# Patient Record
Sex: FEMALE | Race: WHITE | NOT HISPANIC OR LATINO | Employment: OTHER | RURAL
[De-identification: names, ages, dates, MRNs, and addresses within clinical notes are randomized per-mention and may not be internally consistent; named-entity substitution may affect disease eponyms.]

---

## 2021-05-17 ENCOUNTER — LAB (OUTPATIENT)
Dept: LAB | Facility: HOSPITAL | Age: 76
End: 2021-05-17

## 2021-05-17 ENCOUNTER — OFFICE VISIT (OUTPATIENT)
Dept: FAMILY MEDICINE CLINIC | Facility: CLINIC | Age: 76
End: 2021-05-17

## 2021-05-17 VITALS
DIASTOLIC BLOOD PRESSURE: 60 MMHG | WEIGHT: 168.8 LBS | HEIGHT: 60 IN | RESPIRATION RATE: 18 BRPM | OXYGEN SATURATION: 97 % | SYSTOLIC BLOOD PRESSURE: 98 MMHG | HEART RATE: 63 BPM | BODY MASS INDEX: 33.14 KG/M2

## 2021-05-17 DIAGNOSIS — I48.91 ATRIAL FIBRILLATION, UNSPECIFIED TYPE (HCC): ICD-10-CM

## 2021-05-17 DIAGNOSIS — J44.9 CHRONIC OBSTRUCTIVE PULMONARY DISEASE, UNSPECIFIED COPD TYPE (HCC): ICD-10-CM

## 2021-05-17 DIAGNOSIS — I50.21 ACUTE SYSTOLIC CHF (CONGESTIVE HEART FAILURE) (HCC): ICD-10-CM

## 2021-05-17 DIAGNOSIS — M1A.9XX0 CHRONIC GOUT WITHOUT TOPHUS, UNSPECIFIED CAUSE, UNSPECIFIED SITE: Primary | ICD-10-CM

## 2021-05-17 DIAGNOSIS — E53.8 B12 DEFICIENCY: ICD-10-CM

## 2021-05-17 PROCEDURE — 80053 COMPREHEN METABOLIC PANEL: CPT | Performed by: STUDENT IN AN ORGANIZED HEALTH CARE EDUCATION/TRAINING PROGRAM

## 2021-05-17 PROCEDURE — 84550 ASSAY OF BLOOD/URIC ACID: CPT | Performed by: STUDENT IN AN ORGANIZED HEALTH CARE EDUCATION/TRAINING PROGRAM

## 2021-05-17 PROCEDURE — 99204 OFFICE O/P NEW MOD 45 MIN: CPT | Performed by: STUDENT IN AN ORGANIZED HEALTH CARE EDUCATION/TRAINING PROGRAM

## 2021-05-17 PROCEDURE — 82607 VITAMIN B-12: CPT | Performed by: STUDENT IN AN ORGANIZED HEALTH CARE EDUCATION/TRAINING PROGRAM

## 2021-05-17 PROCEDURE — 80061 LIPID PANEL: CPT | Performed by: STUDENT IN AN ORGANIZED HEALTH CARE EDUCATION/TRAINING PROGRAM

## 2021-05-17 PROCEDURE — 85025 COMPLETE CBC W/AUTO DIFF WBC: CPT | Performed by: STUDENT IN AN ORGANIZED HEALTH CARE EDUCATION/TRAINING PROGRAM

## 2021-05-17 PROCEDURE — 85007 BL SMEAR W/DIFF WBC COUNT: CPT | Performed by: STUDENT IN AN ORGANIZED HEALTH CARE EDUCATION/TRAINING PROGRAM

## 2021-05-17 RX ORDER — ALBUTEROL SULFATE 90 UG/1
2 AEROSOL, METERED RESPIRATORY (INHALATION) EVERY 4 HOURS PRN
Qty: 18 G | Refills: 6 | Status: SHIPPED | OUTPATIENT
Start: 2021-05-17

## 2021-05-17 RX ORDER — LANOLIN ALCOHOL/MO/W.PET/CERES
1000 CREAM (GRAM) TOPICAL DAILY
Qty: 90 TABLET | Refills: 1 | Status: SHIPPED | OUTPATIENT
Start: 2021-05-17 | End: 2021-12-28

## 2021-05-17 RX ORDER — ASPIRIN 325 MG
81 TABLET ORAL DAILY
COMMUNITY
End: 2021-05-17

## 2021-05-17 RX ORDER — ASPIRIN 81 MG/1
81 TABLET ORAL DAILY
Qty: 90 TABLET | Refills: 1 | Status: SHIPPED | OUTPATIENT
Start: 2021-05-17

## 2021-05-17 RX ORDER — ALLOPURINOL 300 MG/1
300 TABLET ORAL DAILY
Qty: 90 TABLET | Refills: 1 | Status: SHIPPED | OUTPATIENT
Start: 2021-05-17 | End: 2021-05-19

## 2021-05-17 RX ORDER — ALBUTEROL SULFATE 90 UG/1
2 AEROSOL, METERED RESPIRATORY (INHALATION) EVERY 4 HOURS PRN
COMMUNITY
End: 2021-05-17 | Stop reason: SDUPTHER

## 2021-05-17 RX ORDER — ALLOPURINOL 300 MG/1
300 TABLET ORAL DAILY
COMMUNITY
End: 2021-05-17 | Stop reason: SDUPTHER

## 2021-05-17 RX ORDER — METOPROLOL TARTRATE 100 MG/1
100 TABLET ORAL 2 TIMES DAILY
Qty: 180 TABLET | Refills: 1 | Status: SHIPPED | OUTPATIENT
Start: 2021-05-17 | End: 2022-03-15

## 2021-05-17 RX ORDER — PHENOL 1.4 %
600 AEROSOL, SPRAY (ML) MUCOUS MEMBRANE DAILY
COMMUNITY

## 2021-05-17 RX ORDER — LANOLIN ALCOHOL/MO/W.PET/CERES
1000 CREAM (GRAM) TOPICAL DAILY
COMMUNITY
End: 2021-05-17 | Stop reason: SDUPTHER

## 2021-05-17 RX ORDER — METOPROLOL TARTRATE 100 MG/1
100 TABLET ORAL 2 TIMES DAILY
COMMUNITY
End: 2021-05-17 | Stop reason: SDUPTHER

## 2021-05-17 NOTE — PROGRESS NOTES
"Subjective:  Pennie Gibson is a 76 y.o. female who presents for establish care.    Pt recently moved from [place], poor historian.       Gout; patient currently on allopurinol 3 mg daily, tolerating well, no adverse effects of medication.  No recent gout flares.    COPD; patient currently on albuterol inhaler as needed, no other medications, no shortness of breath, wheezing, productive cough, fever, chills. Uses Albuterol 1-2 times per month, 44 pack year history, quit ~23 years ago.     Afib/CHF/CAD; patient with pacemaker, reports being on her 2nd or third, on metoprolol 100 mg twice daily, tolerating well, no adverse effects.  Needs referral to cardiology.  Blood pressure today was 98/60, has not been checking BP at home.     Pt reports having a CVA around age of 53 years old, reports having issues with short term memory loss since then.       Vitals:    Vitals:    05/17/21 0951   BP: 98/60   Pulse: 63   Resp: 18   SpO2: 97%   Weight: 76.6 kg (168 lb 12.8 oz)   Height: 152.4 cm (60\")     Body mass index is 32.97 kg/m².      Current Outpatient Medications:   •  albuterol sulfate  (90 Base) MCG/ACT inhaler, Inhale 2 puffs Every 4 (Four) Hours As Needed for Wheezing., Disp: 18 g, Rfl: 6  •  allopurinol (ZYLOPRIM) 300 MG tablet, Take 1 tablet by mouth Daily., Disp: 90 tablet, Rfl: 1  •  calcium carbonate (OS-MICHELL) 600 MG tablet, Take 600 mg by mouth Daily., Disp: , Rfl:   •  Cranberry 1000 MG capsule, Take 4,200 mg by mouth Daily., Disp: , Rfl:   •  Garlic 1000 MG capsule, Take 1,000 mg by mouth Daily., Disp: , Rfl:   •  metoprolol tartrate (LOPRESSOR) 100 MG tablet, Take 1 tablet by mouth 2 (Two) Times a Day., Disp: 180 tablet, Rfl: 1  •  rivaroxaban (XARELTO) 20 MG tablet, Take 1 tablet by mouth Daily., Disp: 90 tablet, Rfl: 1  •  vitamin B-12 (CYANOCOBALAMIN) 1000 MCG tablet, Take 1 tablet by mouth Daily., Disp: 90 tablet, Rfl: 1  •  aspirin (aspirin) 81 MG EC tablet, Take 1 tablet by mouth Daily., Disp: " 90 tablet, Rfl: 1    There is no problem list on file for this patient.    Past Surgical History:   Procedure Laterality Date   • BLADDER SURGERY     • CARDIAC SURGERY     • COLON RESECTION     • PACEMAKER IMPLANTATION     • VAGINAL BIOPSY       Social History     Socioeconomic History   • Marital status:      Spouse name: Not on file   • Number of children: Not on file   • Years of education: Not on file   • Highest education level: Not on file   Tobacco Use   • Smoking status: Never Smoker   • Smokeless tobacco: Never Used   Vaping Use   • Vaping Use: Never used   Substance and Sexual Activity   • Alcohol use: Defer   • Drug use: Never   • Sexual activity: Defer     Family History   Problem Relation Age of Onset   • Cancer Mother    • Diabetes Father    • Diabetes Sister    • Diabetes Brother      No results found for any previous visit.      No image results found.    @[facility]@    There is no immunization history on file for this patient.  The following portions of the patient's history were reviewed and updated as appropriate: allergies, current medications, past family history, past medical history, past social history, past surgical history and problem list.    PHQ-9 Total Score:           Physical Exam  Constitutional:       Appearance: Normal appearance.   HENT:      Head: Normocephalic and atraumatic.      Right Ear: External ear normal.      Left Ear: External ear normal.   Eyes:      General:         Right eye: No discharge.         Left eye: No discharge.      Conjunctiva/sclera: Conjunctivae normal.   Cardiovascular:      Rate and Rhythm: Normal rate and regular rhythm.      Pulses: Normal pulses.      Heart sounds: Normal heart sounds. No murmur heard.     Pulmonary:      Effort: Pulmonary effort is normal. No respiratory distress.      Breath sounds: Normal breath sounds.   Abdominal:      General: There is no distension.      Palpations: Abdomen is soft.      Tenderness: There is no  abdominal tenderness.   Musculoskeletal:      Cervical back: Normal range of motion.      Right lower leg: No edema.      Left lower leg: No edema.   Lymphadenopathy:      Cervical: No cervical adenopathy.   Neurological:      Mental Status: She is alert. Mental status is at baseline.   Psychiatric:         Mood and Affect: Mood normal.         Behavior: Behavior normal.         Assessment/Plan    Diagnosis Plan   1. Chronic gout without tophus, unspecified cause, unspecified site  Uric Acid    allopurinol (ZYLOPRIM) 300 MG tablet   2. Acute systolic CHF (congestive heart failure) (CMS/[facility])  CBC & Differential    Comprehensive Metabolic Panel    Ambulatory Referral to Cardiology    aspirin (aspirin) 81 MG EC tablet    metoprolol tartrate (LOPRESSOR) 100 MG tablet    Lipid Panel   3. Chronic obstructive pulmonary disease, unspecified COPD type (CMS/[facility])  albuterol sulfate  (90 Base) MCG/ACT inhaler   4. B12 deficiency  vitamin B-12 (CYANOCOBALAMIN) 1000 MCG tablet    Vitamin B12   5. Atrial fibrillation, unspecified type (CMS/[facility])  rivaroxaban (XARELTO) 20 MG tablet      Orders Placed This Encounter   Procedures   • Uric Acid     Order Specific Question:   Release to patient     Answer:   Immediate   • Comprehensive Metabolic Panel     Order Specific Question:   Release to patient     Answer:   Immediate   • Vitamin B12     Order Specific Question:   Release to patient     Answer:   Immediate   • Lipid Panel   • Ambulatory Referral to Cardiology     Referral Priority:   Routine     Referral Type:   Consultation     Referral Reason:   Specialty Services Required     Requested Specialty:   Cardiology     Number of Visits Requested:   1   • CBC & Differential     Order Specific Question:   Manual Differential     Answer:   No     Unfortunately pt poor historian, daughter now assisting with care after move from [place].  Unclear as to why patient not on cholesterol medication.  Will obtain labs as above, records  from previous provider, unless contraindication found, will begin on cholesterol medication.  Tolerating medications well as above, no adverse effects, patient currently stable with no acute complaints.  Follow-up in 1 month or sooner if needed.          This document has been electronically signed by Isaac Love MD on May 17, 2021 11:07 CDT

## 2021-05-18 LAB
ALBUMIN SERPL-MCNC: 3.8 G/DL (ref 3.5–5.2)
ALBUMIN/GLOB SERPL: 1.3 G/DL
ALP SERPL-CCNC: 69 U/L (ref 39–117)
ALT SERPL W P-5'-P-CCNC: 10 U/L (ref 1–33)
ANION GAP SERPL CALCULATED.3IONS-SCNC: 12 MMOL/L (ref 5–15)
AST SERPL-CCNC: 16 U/L (ref 1–32)
BILIRUB SERPL-MCNC: 0.3 MG/DL (ref 0–1.2)
BUN SERPL-MCNC: 20 MG/DL (ref 8–23)
BUN/CREAT SERPL: 9.8 (ref 7–25)
CALCIUM SPEC-SCNC: 9.7 MG/DL (ref 8.6–10.5)
CHLORIDE SERPL-SCNC: 104 MMOL/L (ref 98–107)
CHOLEST SERPL-MCNC: 148 MG/DL (ref 0–200)
CO2 SERPL-SCNC: 25 MMOL/L (ref 22–29)
CREAT SERPL-MCNC: 2.04 MG/DL (ref 0.57–1)
DEPRECATED RDW RBC AUTO: 39.6 FL (ref 37–54)
EOSINOPHIL # BLD MANUAL: 0.24 10*3/MM3 (ref 0–0.4)
EOSINOPHIL NFR BLD MANUAL: 2 % (ref 0.3–6.2)
ERYTHROCYTE [DISTWIDTH] IN BLOOD BY AUTOMATED COUNT: 13.5 % (ref 12.3–15.4)
GFR SERPL CREATININE-BSD FRML MDRD: 24 ML/MIN/1.73
GLOBULIN UR ELPH-MCNC: 2.9 GM/DL
GLUCOSE SERPL-MCNC: 77 MG/DL (ref 65–99)
HCT VFR BLD AUTO: 42.3 % (ref 34–46.6)
HDLC SERPL-MCNC: 33 MG/DL (ref 40–60)
HGB BLD-MCNC: 14.3 G/DL (ref 12–15.9)
LDLC SERPL CALC-MCNC: 91 MG/DL (ref 0–100)
LDLC/HDLC SERPL: 2.66 {RATIO}
LYMPHOCYTES # BLD MANUAL: 4.24 10*3/MM3 (ref 0.7–3.1)
LYMPHOCYTES NFR BLD MANUAL: 10.1 % (ref 5–12)
LYMPHOCYTES NFR BLD MANUAL: 35.4 % (ref 19.6–45.3)
MCH RBC QN AUTO: 28.1 PG (ref 26.6–33)
MCHC RBC AUTO-ENTMCNC: 33.8 G/DL (ref 31.5–35.7)
MCV RBC AUTO: 83.1 FL (ref 79–97)
MONOCYTES # BLD AUTO: 1.21 10*3/MM3 (ref 0.1–0.9)
NEUTROPHILS # BLD AUTO: 6.28 10*3/MM3 (ref 1.7–7)
NEUTROPHILS NFR BLD MANUAL: 52.5 % (ref 42.7–76)
OVALOCYTES BLD QL SMEAR: ABNORMAL
PLAT MORPH BLD: NORMAL
PLATELET # BLD AUTO: 374 10*3/MM3 (ref 140–450)
PMV BLD AUTO: 10.4 FL (ref 6–12)
POIKILOCYTOSIS BLD QL SMEAR: ABNORMAL
POTASSIUM SERPL-SCNC: 4.3 MMOL/L (ref 3.5–5.2)
PROT SERPL-MCNC: 6.7 G/DL (ref 6–8.5)
RBC # BLD AUTO: 5.09 10*6/MM3 (ref 3.77–5.28)
SODIUM SERPL-SCNC: 141 MMOL/L (ref 136–145)
TRIGL SERPL-MCNC: 136 MG/DL (ref 0–150)
URATE SERPL-MCNC: 5.6 MG/DL (ref 2.4–5.7)
VIT B12 BLD-MCNC: 697 PG/ML (ref 211–946)
VLDLC SERPL-MCNC: 24 MG/DL (ref 5–40)
WBC # BLD AUTO: 11.97 10*3/MM3 (ref 3.4–10.8)
WBC MORPH BLD: NORMAL

## 2021-05-19 ENCOUNTER — TELEPHONE (OUTPATIENT)
Dept: FAMILY MEDICINE CLINIC | Facility: CLINIC | Age: 76
End: 2021-05-19

## 2021-05-19 DIAGNOSIS — M1A.9XX0 CHRONIC GOUT WITHOUT TOPHUS, UNSPECIFIED CAUSE, UNSPECIFIED SITE: ICD-10-CM

## 2021-05-19 DIAGNOSIS — N18.4 STAGE 4 CHRONIC KIDNEY DISEASE (HCC): Primary | ICD-10-CM

## 2021-05-19 RX ORDER — ALLOPURINOL 100 MG/1
50 TABLET ORAL EVERY OTHER DAY
Qty: 90 TABLET | Refills: 0 | Status: SHIPPED | OUTPATIENT
Start: 2021-05-19 | End: 2021-06-18

## 2021-05-19 NOTE — TELEPHONE ENCOUNTER
Advised pt's daughter of lab results. Pt's daughter wants to keep all referral's within [facility]. Will make medication changes today.

## 2021-05-19 NOTE — TELEPHONE ENCOUNTER
Called pt to go over lab results. If I am not in the office if/when she calls back can this note be sent to another MA to advise pt to take meds differently, please.

## 2021-05-19 NOTE — TELEPHONE ENCOUNTER
----- Message from Isaac Love MD sent at 5/19/2021  9:12 AM CDT -----  Labs significant for stage 4 (severe) kidney disease, as such I have reduced your allopurinol to 50mg every other day and referred you to nephrology. Please STOP taking 300mg daily. We will check at follow up.

## 2021-05-21 ENCOUNTER — OFFICE VISIT (OUTPATIENT)
Dept: FAMILY MEDICINE CLINIC | Facility: CLINIC | Age: 76
End: 2021-05-21

## 2021-05-21 VITALS
DIASTOLIC BLOOD PRESSURE: 66 MMHG | WEIGHT: 169.2 LBS | OXYGEN SATURATION: 96 % | HEIGHT: 60 IN | SYSTOLIC BLOOD PRESSURE: 100 MMHG | HEART RATE: 66 BPM | TEMPERATURE: 98 F | BODY MASS INDEX: 33.22 KG/M2 | RESPIRATION RATE: 24 BRPM

## 2021-05-21 DIAGNOSIS — N39.0 ACUTE UTI: Primary | ICD-10-CM

## 2021-05-21 DIAGNOSIS — N28.9 DECREASED RENAL FUNCTION: ICD-10-CM

## 2021-05-21 DIAGNOSIS — J30.2 SEASONAL ALLERGIES: ICD-10-CM

## 2021-05-21 DIAGNOSIS — R39.9 UTI SYMPTOMS: ICD-10-CM

## 2021-05-21 LAB
BILIRUB BLD-MCNC: NEGATIVE MG/DL
CLARITY, POC: ABNORMAL
COLOR UR: YELLOW
GLUCOSE UR STRIP-MCNC: NEGATIVE MG/DL
KETONES UR QL: NEGATIVE
LEUKOCYTE EST, POC: ABNORMAL
NITRITE UR-MCNC: NEGATIVE MG/ML
PH UR: 6 [PH] (ref 5–8)
PROT UR STRIP-MCNC: ABNORMAL MG/DL
RBC # UR STRIP: ABNORMAL /UL
SP GR UR: 1.02 (ref 1–1.03)
UROBILINOGEN UR QL: NORMAL

## 2021-05-21 PROCEDURE — 87186 SC STD MICRODIL/AGAR DIL: CPT | Performed by: NURSE PRACTITIONER

## 2021-05-21 PROCEDURE — 87077 CULTURE AEROBIC IDENTIFY: CPT | Performed by: NURSE PRACTITIONER

## 2021-05-21 PROCEDURE — 99213 OFFICE O/P EST LOW 20 MIN: CPT | Performed by: NURSE PRACTITIONER

## 2021-05-21 PROCEDURE — 87086 URINE CULTURE/COLONY COUNT: CPT | Performed by: NURSE PRACTITIONER

## 2021-05-21 PROCEDURE — 81003 URINALYSIS AUTO W/O SCOPE: CPT | Performed by: NURSE PRACTITIONER

## 2021-05-21 RX ORDER — CIPROFLOXACIN 250 MG/1
250 TABLET, FILM COATED ORAL 2 TIMES DAILY
Qty: 14 TABLET | Refills: 0 | Status: SHIPPED | OUTPATIENT
Start: 2021-05-21 | End: 2021-05-24

## 2021-05-21 NOTE — PATIENT INSTRUCTIONS
Urinary Tract Infection, Adult    A urinary tract infection (UTI) is an infection of any part of the urinary tract. The urinary tract includes the kidneys, ureters, bladder, and urethra. These organs make, store, and get rid of urine in the body.  Your health care provider may use other names to describe the infection. An upper UTI affects the ureters and kidneys (pyelonephritis). A lower UTI affects the bladder (cystitis) and urethra (urethritis).  What are the causes?  Most urinary tract infections are caused by bacteria in your genital area, around the entrance to your urinary tract (urethra). These bacteria grow and cause inflammation of your urinary tract.  What increases the risk?  You are more likely to develop this condition if:  · You have a urinary catheter that stays in place (indwelling).  · You are not able to control when you urinate or have a bowel movement (you have incontinence).  · You are female and you:  ? Use a spermicide or diaphragm for birth control.  ? Have low estrogen levels.  ? Are pregnant.  · You have certain genes that increase your risk (genetics).  · You are sexually active.  · You take antibiotic medicines.  · You have a condition that causes your flow of urine to slow down, such as:  ? An enlarged prostate, if you are male.  ? Blockage in your urethra (stricture).  ? A kidney stone.  ? A nerve condition that affects your bladder control (neurogenic bladder).  ? Not getting enough to drink, or not urinating often.  · You have certain medical conditions, such as:  ? Diabetes.  ? A weak disease-fighting system (immunesystem).  ? Sickle cell disease.  ? Gout.  ? Spinal cord injury.  What are the signs or symptoms?  Symptoms of this condition include:  · Needing to urinate right away (urgently).  · Frequent urination or passing small amounts of urine frequently.  · Pain or burning with urination.  · Blood in the urine.  · Urine that smells bad or unusual.  · Trouble urinating.  · Cloudy  urine.  · Vaginal discharge, if you are female.  · Pain in the abdomen or the lower back.  You may also have:  · Vomiting or a decreased appetite.  · Confusion.  · Irritability or tiredness.  · A fever.  · Diarrhea.  The first symptom in older adults may be confusion. In some cases, they may not have any symptoms until the infection has worsened.  How is this diagnosed?  This condition is diagnosed based on your medical history and a physical exam. You may also have other tests, including:  · Urine tests.  · Blood tests.  · Tests for sexually transmitted infections (STIs).  If you have had more than one UTI, a cystoscopy or imaging studies may be done to determine the cause of the infections.  How is this treated?  Treatment for this condition includes:  · Antibiotic medicine.  · Over-the-counter medicines to treat discomfort.  · Drinking enough water to stay hydrated.  If you have frequent infections or have other conditions such as a kidney stone, you may need to see a health care provider who specializes in the urinary tract (urologist).  In rare cases, urinary tract infections can cause sepsis. Sepsis is a life-threatening condition that occurs when the body responds to an infection. Sepsis is treated in the hospital with IV antibiotics, fluids, and other medicines.  Follow these instructions at home:    Medicines  · Take over-the-counter and prescription medicines only as told by your health care provider.  · If you were prescribed an antibiotic medicine, take it as told by your health care provider. Do not stop using the antibiotic even if you start to feel better.  General instructions  · Make sure you:  ? Empty your bladder often and completely. Do not hold urine for long periods of time.  ? Empty your bladder after sex.  ? Wipe from front to back after a bowel movement if you are female. Use each tissue one time when you wipe.  · Drink enough fluid to keep your urine pale yellow.  · Keep all follow-up  visits as told by your health care provider. This is important.  Contact a health care provider if:  · Your symptoms do not get better after 1-2 days.  · Your symptoms go away and then return.  Get help right away if you have:  · Severe pain in your back or your lower abdomen.  · A fever.  · Nausea or vomiting.  Summary  · A urinary tract infection (UTI) is an infection of any part of the urinary tract, which includes the kidneys, ureters, bladder, and urethra.  · Most urinary tract infections are caused by bacteria in your genital area, around the entrance to your urinary tract (urethra).  · Treatment for this condition often includes antibiotic medicines.  · If you were prescribed an antibiotic medicine, take it as told by your health care provider. Do not stop using the antibiotic even if you start to feel better.  · Keep all follow-up visits as told by your health care provider. This is important.  This information is not intended to replace advice given to you by your health care provider. Make sure you discuss any questions you have with your health care provider.  Document Revised: 12/05/2019 Document Reviewed: 06/27/2019  [employer] Patient Education © 2021 [employer]

## 2021-05-21 NOTE — PROGRESS NOTES
"Chief Complaint  Cystitis    Subjective          Pennie Gibson presents to [facility]    [facility]    PCP: Dr. Love    CC: \"bladder infection symptoms\"    Recently moved here to live near daughter from [place].  Established with Dr. Love on 5/17.  Started having UTI symptoms about 2-3 days ago.  C/O urgency, mild dysuria.  Reports hx of recurrent UTI's in the past, but has been about 6 months since she was last treated.  Recent labs showed decreased renal function and she has bene referred to nephrology, waiting on appt.  Denies previously being followed by neprhology in [place].      Also c/o congestion, ears popping at times since moving to [place].     Poor historian on some of her history.     Cystitis  This is a new problem. Episode onset: x 2-3 days. The problem occurs daily. The problem has been gradually worsening. Associated symptoms include congestion. Pertinent negatives include no abdominal pain, anorexia, arthralgias, change in bowel habit, chest pain, chills, coughing, diaphoresis, fatigue, fever, headaches, joint swelling, myalgias, nausea, neck pain, numbness, rash, sore throat, swollen glands, urinary symptoms, vertigo, visual change, vomiting or weakness. Exacerbated by: admits to not drinking much water, drinks soda. She has tried nothing for the symptoms.       Review of Systems   Constitutional: Negative for appetite change, chills, diaphoresis, fatigue and fever.   HENT: Positive for congestion and ear pain (popping). Negative for ear discharge, postnasal drip, rhinorrhea, sinus pressure, sinus pain, sneezing, sore throat and trouble swallowing.    Eyes: Negative.    Respiratory: Negative.  Negative for cough.    Cardiovascular: Negative.  Negative for chest pain.   Gastrointestinal: Negative for abdominal pain, anorexia, change in bowel habit, nausea and vomiting.   Genitourinary: Positive for dysuria, frequency and urgency. Negative for flank " "pain.   Musculoskeletal: Negative for arthralgias, joint swelling, myalgias and neck pain.   Skin: Negative for rash.   Neurological: Negative for dizziness, vertigo, weakness, numbness and headaches.        Objective   Vital Signs:   /66 (BP Location: Left arm, Patient Position: Sitting, Cuff Size: Adult)   Pulse 66   Temp 98 °F (36.7 °C)   Resp 24   Ht 152.4 cm (60\")   Wt 76.7 kg (169 lb 3.2 oz)   SpO2 96%   BMI 33.04 kg/m²       Physical Exam  Vitals and nursing note reviewed.   Constitutional:       General: She is not in acute distress.     Appearance: Normal appearance. She is obese. She is not ill-appearing.   HENT:      Head: Normocephalic and atraumatic.      Right Ear: Ear canal normal. A middle ear effusion ( small) is present. Tympanic membrane is not erythematous.      Left Ear: Ear canal normal. A middle ear effusion ( small) is present. Tympanic membrane is not erythematous.      Nose: Mucosal edema (pale, boggy) and congestion present.      Right Sinus: No maxillary sinus tenderness or frontal sinus tenderness.      Left Sinus: No maxillary sinus tenderness or frontal sinus tenderness.      Mouth/Throat:      Mouth: Mucous membranes are moist.      Pharynx: Oropharynx is clear. No pharyngeal swelling, oropharyngeal exudate or posterior oropharyngeal erythema.   Eyes:      General:         Right eye: No discharge.         Left eye: No discharge.      Conjunctiva/sclera: Conjunctivae normal.   Cardiovascular:      Rate and Rhythm: Normal rate and regular rhythm.   Pulmonary:      Effort: Pulmonary effort is normal. No respiratory distress.      Breath sounds: Normal breath sounds. No wheezing, rhonchi or rales.   Abdominal:      General: Bowel sounds are normal.      Palpations: Abdomen is soft.      Tenderness: There is no abdominal tenderness. There is no right CVA tenderness, left CVA tenderness or guarding.   Musculoskeletal:      Cervical back: Neck supple. No tenderness.   "   Lymphadenopathy:      Cervical: No cervical adenopathy.   Skin:     General: Skin is warm and dry.   Neurological:      General: No focal deficit present.      Mental Status: She is alert and oriented to person, place, and time.          Result Review :     CMP    CMP 5/17/21   Glucose 77   BUN 20   Creatinine 2.04 (A)   eGFR Non African Am 24 (A)   Sodium 141   Potassium 4.3   Chloride 104   Calcium 9.7   Albumin 3.80   Total Bilirubin 0.3   Alkaline Phosphatase 69   AST (SGOT) 16   ALT (SGPT) 10   (A) Abnormal value                   Recent Results (from the past 24 hour(s))   POCT urinalysis dipstick, automated    Collection Time: 05/21/21  2:26 PM    Specimen: Urine   Result Value Ref Range    Color Yellow Yellow, Straw, Dark Yellow, [place]    Clarity, UA Cloudy (A) Clear    Specific Gravity  1.020 1.005 - 1.030    pH, Urine 6.0 5.0 - 8.0    Leukocytes Large (3+) (A) Negative    Nitrite, UA Negative Negative    Protein, POC Trace (A) Negative mg/dL    Glucose, UA Negative Negative, 1000 mg/dL (3+) mg/dL    Ketones, UA Negative Negative    Urobilinogen, UA Normal Normal    Bilirubin Negative Negative    Blood, UA Trace (A) Negative             Assessment and Plan    Diagnoses and all orders for this visit:    1. Acute UTI (Primary)  -     ciprofloxacin (Cipro) 250 MG tablet; Take 1 tablet by mouth 2 (Two) Times a Day.  Dispense: 14 tablet; Refill: 0    2. UTI symptoms  -     POCT urinalysis dipstick, automated  -     Urine Culture - Urine, Urine, Clean Catch    3. Seasonal allergies    Increase water, decrease sodas  Rx for Cipro provided  Urine culture pending--will notify with results when available  Referral to nephrology being handled by PCP    Seasonal allergies--may use OTC Claritin or Allegra as needed    Patient's Body mass index is 33.04 kg/m². indicating that she is obese (BMI >30). Obesity-related health conditions include the following: hypertension. Obesity is unchanged. BMI is is above average; BMI  management plan is completed. We discussed portion control and increasing exercise..    See PCP or RTC if symptoms persist/worsen  See PCP for routine f/u visit and management of chronic medical conditions      This document has been electronically signed by DEN Pressley on May 21, 2021 14:33 CDT,.

## 2021-05-23 LAB — BACTERIA SPEC AEROBE CULT: ABNORMAL

## 2021-05-24 DIAGNOSIS — N39.0 ACUTE UTI: Primary | ICD-10-CM

## 2021-05-24 RX ORDER — GRANULES FOR ORAL 3 G/1
3 POWDER ORAL ONCE
Qty: 3 G | Refills: 0 | Status: SHIPPED | OUTPATIENT
Start: 2021-05-24 | End: 2021-05-24

## 2021-06-02 ENCOUNTER — OFFICE VISIT (OUTPATIENT)
Dept: CARDIOLOGY | Facility: CLINIC | Age: 76
End: 2021-06-02

## 2021-06-02 VITALS
WEIGHT: 174.2 LBS | HEART RATE: 60 BPM | HEIGHT: 60 IN | DIASTOLIC BLOOD PRESSURE: 64 MMHG | OXYGEN SATURATION: 97 % | BODY MASS INDEX: 34.2 KG/M2 | SYSTOLIC BLOOD PRESSURE: 122 MMHG

## 2021-06-02 DIAGNOSIS — I34.0 NONRHEUMATIC MITRAL VALVE REGURGITATION: ICD-10-CM

## 2021-06-02 DIAGNOSIS — I48.91 ATRIAL FIBRILLATION, UNSPECIFIED TYPE (HCC): ICD-10-CM

## 2021-06-02 DIAGNOSIS — I36.1 NONRHEUMATIC TRICUSPID VALVE REGURGITATION: ICD-10-CM

## 2021-06-02 DIAGNOSIS — Z95.0 PRESENCE OF CARDIAC PACEMAKER: ICD-10-CM

## 2021-06-02 DIAGNOSIS — I50.9 CONGESTIVE HEART FAILURE, UNSPECIFIED HF CHRONICITY, UNSPECIFIED HEART FAILURE TYPE (HCC): Primary | ICD-10-CM

## 2021-06-02 DIAGNOSIS — I49.5 SSS (SICK SINUS SYNDROME) (HCC): ICD-10-CM

## 2021-06-02 DIAGNOSIS — N39.0 ACUTE UTI: Primary | ICD-10-CM

## 2021-06-02 PROCEDURE — 99214 OFFICE O/P EST MOD 30 MIN: CPT | Performed by: NURSE PRACTITIONER

## 2021-06-02 PROCEDURE — 93000 ELECTROCARDIOGRAM COMPLETE: CPT | Performed by: INTERNAL MEDICINE

## 2021-06-02 NOTE — PROGRESS NOTES
[facility] CHF CLINIC OFFICE VISIT    Subjective:     Congestive Heart Failure (chief complaint)      History of Present Illness      PCP: Dr. Love  Cardiologist:  Nephrologist: Dr. Jorge Herring  Pulmonologist:    Pennie Gibson is a 76-year-old  female he moved to this area to live with her daughter, previously she lived in [place].  Complex past medical history of gout (chronic, managed with allopurinol), COPD (chronic), history of atrial fibrillation (OAC with Xarelto) , CAD, hypertension, hyperlipidemia,  gout, obesity, stage III kidney disease, CVA, sick sinus syndrome status post PPM in 2005 with atrial lead placement in 2015 , bifascicular block, aortic valve calcification, mild AR, moderate MR and TR. In reviewing her records it appears she underwent myocardial perfusion imaging in light 2020 which showed apical perfusion defect, small with indeterminate criteria for ischemia.  Patient was recommended for continued medical management.  She has had 3 previous heart cath in 2008, 2010 which showed 30% mild CAD noted to the RCA.  Last left heart catheterization in April 2016, patient denies previous stent placement.  Last echocardiogram in July 2020 showing aortic calcification with mild aortic regurgitation, mild mitral and tricuspid regurgitation, mild pulmonic regurgitation, borderline biatrial enlargement, normal LVEF at 65%. Patient has a [employer] permanent pacemaker last interrogated in November 2020 showing normal device function with battery life of 1.5 years.      Patient mentions shortness of breath with exertion.  She denies chest pain, edema, orthopnea, PND, palpitations or dizziness.  She reports she is tolerating her medications and doing well.  She is a previous smoker and quit 23 years ago.  She is a poor historian and much of her history is obtained from her medical records, which are quite extensive to review.                  Past Medical History:   Diagnosis Date   • Asthma     • CHF (congestive heart failure) (CMS/[facility])    • COPD (chronic obstructive pulmonary disease) (CMS/[facility])    • Pacemaker      Past Surgical History:   Procedure Laterality Date   • BLADDER SURGERY     • CARDIAC SURGERY     • COLON RESECTION     • PACEMAKER IMPLANTATION     • VAGINAL BIOPSY       Social History     Socioeconomic History   • Marital status:      Spouse name: Not on file   • Number of children: Not on file   • Years of education: Not on file   • Highest education level: Not on file   Tobacco Use   • Smoking status: Never Smoker   • Smokeless tobacco: Never Used   Vaping Use   • Vaping Use: Never used   Substance and Sexual Activity   • Alcohol use: Defer   • Drug use: Never   • Sexual activity: Defer     Family History   Problem Relation Age of Onset   • Cancer Mother    • Diabetes Father    • Diabetes Sister    • Diabetes Brother        Allergies:  Allergies   Allergen Reactions   • Penicillins Rash       Review of Systems   Constitutional: Positive for malaise/fatigue. Negative for chills, fever and weight gain.   HENT: Negative for nosebleeds and tinnitus.    Eyes: Negative for blurred vision and double vision.   Cardiovascular: Positive for dyspnea on exertion. Negative for chest pain, irregular heartbeat, leg swelling, palpitations and syncope.   Respiratory: Positive for shortness of breath. Negative for cough, sleep disturbances due to breathing and snoring.    Endocrine: Negative for polydipsia, polyphagia and polyuria.   Hematologic/Lymphatic: Negative for bleeding problem. Does not bruise/bleed easily.   Skin: Negative for color change and suspicious lesions.   Musculoskeletal: Negative for falls and myalgias.   Gastrointestinal: Negative for bloating, heartburn and hematochezia.   Genitourinary: Negative for dysuria and hematuria.   Neurological: Negative for dizziness, headaches, seizures, vertigo and weakness.   Psychiatric/Behavioral: Negative for altered mental status and  "depression. The patient does not have insomnia and is not nervous/anxious.    Allergic/Immunologic: Negative for environmental allergies and persistent infections.       Current Outpatient Medications   Medication Sig Dispense Refill   • albuterol sulfate  (90 Base) MCG/ACT inhaler Inhale 2 puffs Every 4 (Four) Hours As Needed for Wheezing. 18 g 6   • allopurinol (Zyloprim) 100 MG tablet Take 0.5 tablets by mouth Every Other Day. 90 tablet 0   • aspirin (aspirin) 81 MG EC tablet Take 1 tablet by mouth Daily. 90 tablet 1   • calcium carbonate (OS-MICHELL) 600 MG tablet Take 600 mg by mouth Daily.     • Cranberry 1000 MG capsule Take 4,200 mg by mouth Daily.     • Garlic 1000 MG capsule Take 1,000 mg by mouth Daily.     • metoprolol tartrate (LOPRESSOR) 100 MG tablet Take 1 tablet by mouth 2 (Two) Times a Day. 180 tablet 1   • rivaroxaban (XARELTO) 20 MG tablet Take 1 tablet by mouth Daily. 90 tablet 1   • vitamin B-12 (CYANOCOBALAMIN) 1000 MCG tablet Take 1 tablet by mouth Daily. 90 tablet 1     No current facility-administered medications for this visit.        Objective:     Vitals:    06/02/21 1600   BP: 122/64   BP Location: Left arm   Patient Position: Sitting   Cuff Size: Adult   Pulse: 60   SpO2: 97%   Weight: 79 kg (174 lb 3.2 oz)   Height: 152.4 cm (60\")       Wt Readings from Last 3 Encounters:   06/02/21 79 kg (174 lb 3.2 oz)   05/21/21 76.7 kg (169 lb 3.2 oz)   05/17/21 76.6 kg (168 lb 12.8 oz)            Vitals reviewed.   Constitutional:       General: Not in acute distress.     Appearance: Normal appearance. Well-developed. Not toxic-appearing or diaphoretic.   Eyes:      General: Lids are normal.      Conjunctiva/sclera: Conjunctivae normal.   HENT:      Head: Normocephalic and atraumatic.      Right Ear: External ear normal.      Left Ear: External ear normal.   Neck:      Vascular: No JVD.   Pulmonary:      Effort: Pulmonary effort is normal. No respiratory distress.      Breath sounds: Decreased " breath sounds present. No wheezing. No rales.   Chest:      Chest wall: Not tender to palpatation.   Cardiovascular:      PMI at left midclavicular line. Normal rate. Regular rhythm. S1. Sl and s2 split        Murmurs: There is a grade 2/6 systolic murmur at the LLSB.      No gallop. No S3 and S4 gallop. No click. No rub.   Pulses:     Intact distal pulses. No decreased pulses.   Edema:     Peripheral edema absent.   Abdominal:      General: Bowel sounds are normal. There is no distension.      Palpations: Abdomen is soft.      Tenderness: There is no abdominal tenderness.   Musculoskeletal:      Cervical back: Normal range of motion and neck supple. Skin:     General: Skin is warm and dry.      Coloration: Skin is not pale.      Findings: No erythema or rash.   Neurological:      Mental Status: Alert and oriented to person, place, and time.      Gait: Gait normal.   Psychiatric:         Behavior: Behavior normal.         Thought Content: Thought content normal.         Judgment: Judgment normal.         Cardiographics      @[email or web address]    No radiology results for the last 30 days.    Echocardiogram:         ECG:        Stress Testing:           Lab Review     No results found for: TSH  Lab Results   Component Value Date    GLUCOSE 77 05/17/2021    BUN 20 05/17/2021    CREATININE 2.04 (H) 05/17/2021    EGFRIFNONA 24 (L) 05/17/2021    BCR 9.8 05/17/2021    K 4.3 05/17/2021    CO2 25.0 05/17/2021    CALCIUM 9.7 05/17/2021    ALBUMIN 3.80 05/17/2021    AST 16 05/17/2021    ALT 10 05/17/2021     Lab Results   Component Value Date    WBC 11.97 (H) 05/17/2021    HGB 14.3 05/17/2021    HCT 42.3 05/17/2021    MCV 83.1 05/17/2021     05/17/2021       No results found for: CKTOTAL, CKMB, CKMBINDEX, TROPONINI, TROPONINT  No results found for: PROBNP    PFTS:         The following portions of the patient's history were reviewed and updated as appropriate: allergies, current medications, past family history, past medical  history, past social history, past surgical history and problem list.     Old records reviewed and pertinent information is included in the above objective data.     Assessment/Plan:      Diagnosis Plan   1. Congestive heart failure, unspecified HF chronicity, unspecified heart failure type (CMS/HCC)  ECG 12 Lead    Adult Transthoracic Echo Complete w/ Color, Spectral and Contrast if Necessary Per Protocol   2. Nonrheumatic mitral valve regurgitation  Adult Transthoracic Echo Complete w/ Color, Spectral and Contrast if Necessary Per Protocol   3. Nonrheumatic tricuspid valve regurgitation  Adult Transthoracic Echo Complete w/ Color, Spectral and Contrast if Necessary Per Protocol   4. SSS (sick sinus syndrome) (CMS/HCC)     5. Presence of cardiac pacemaker     6. Atrial fibrillation, unspecified type (CMS/HCC)       Upon reviewing chart I see no evidence of congestive heart failure.  There is no evidence of congestive heart failure upon exam today.  She is euvolemic with adequate hemodynamics.  She does have a history of valvular heart disease including aortic calcification with mild aortic regurgitation, mild mitral and tricuspid regurgitation, mild pulmonic regurgitation, borderline biatrial enlargement, normal LVEF at 65%. Patient has a [employer] permanent pacemaker placed for SSS,  last interrogated in November 2020 showing normal device function with battery life of 1.5 year.  I will arrange for patient to establish care with Dr. Medrano.  I have arranged for patient to be transitioned to our pacemaker clinic.  As well she does have a history of atrial fibrillation.  Today she appears to be in sinus rhythm.  She is managed with metoprolol tartrate 100 mg twice daily she is anticoagulated with Xarelto 20 mg daily.     -TTE to reassess for structural heart disease and valvular function.  -EKG today in office showing NSR, bifascicular block which is unchanged from previous EKG.        Follow up: [facility]  with Dr. Medrano as scheduled.               This document has been electronically signed by DEN Hawkins on June 2, 2021 17:38 CDT

## 2021-06-03 ENCOUNTER — LAB (OUTPATIENT)
Dept: LAB | Facility: HOSPITAL | Age: 76
End: 2021-06-03

## 2021-06-03 DIAGNOSIS — N39.0 ACUTE UTI: ICD-10-CM

## 2021-06-03 LAB
BACTERIA UR QL AUTO: ABNORMAL /HPF
BILIRUB UR QL STRIP: NEGATIVE
CLARITY UR: ABNORMAL
COLOR UR: YELLOW
GLUCOSE UR STRIP-MCNC: NEGATIVE MG/DL
HGB UR QL STRIP.AUTO: ABNORMAL
HYALINE CASTS UR QL AUTO: ABNORMAL /LPF
KETONES UR QL STRIP: NEGATIVE
LEUKOCYTE ESTERASE UR QL STRIP.AUTO: ABNORMAL
NITRITE UR QL STRIP: NEGATIVE
PH UR STRIP.AUTO: 6 [PH] (ref 5–9)
PROT UR QL STRIP: NEGATIVE
RBC # UR: ABNORMAL /HPF
REF LAB TEST METHOD: ABNORMAL
SP GR UR STRIP: 1.01 (ref 1–1.03)
SQUAMOUS #/AREA URNS HPF: ABNORMAL /HPF
UROBILINOGEN UR QL STRIP: ABNORMAL
WBC UR QL AUTO: ABNORMAL /HPF

## 2021-06-03 PROCEDURE — 87077 CULTURE AEROBIC IDENTIFY: CPT

## 2021-06-03 PROCEDURE — 87186 SC STD MICRODIL/AGAR DIL: CPT

## 2021-06-03 PROCEDURE — 87086 URINE CULTURE/COLONY COUNT: CPT

## 2021-06-03 PROCEDURE — 81001 URINALYSIS AUTO W/SCOPE: CPT

## 2021-06-04 DIAGNOSIS — B96.20 E-COLI UTI: Primary | ICD-10-CM

## 2021-06-04 DIAGNOSIS — N39.0 RECURRENT UTI: ICD-10-CM

## 2021-06-04 DIAGNOSIS — N39.0 E-COLI UTI: Primary | ICD-10-CM

## 2021-06-04 RX ORDER — CEPHALEXIN 250 MG/1
250 CAPSULE ORAL EVERY 8 HOURS
Qty: 21 CAPSULE | Refills: 0 | Status: SHIPPED | OUTPATIENT
Start: 2021-06-04 | End: 2021-06-11

## 2021-06-05 LAB
BACTERIA SPEC AEROBE CULT: ABNORMAL
QT INTERVAL: 452 MS
QTC INTERVAL: 452 MS

## 2021-06-18 ENCOUNTER — OFFICE VISIT (OUTPATIENT)
Dept: FAMILY MEDICINE CLINIC | Facility: CLINIC | Age: 76
End: 2021-06-18

## 2021-06-18 VITALS
OXYGEN SATURATION: 98 % | TEMPERATURE: 97.1 F | BODY MASS INDEX: 33.57 KG/M2 | DIASTOLIC BLOOD PRESSURE: 60 MMHG | HEART RATE: 70 BPM | WEIGHT: 171 LBS | HEIGHT: 60 IN | SYSTOLIC BLOOD PRESSURE: 105 MMHG

## 2021-06-18 DIAGNOSIS — R39.9 UTI SYMPTOMS: ICD-10-CM

## 2021-06-18 DIAGNOSIS — E78.5 HYPERLIPIDEMIA, UNSPECIFIED HYPERLIPIDEMIA TYPE: Primary | ICD-10-CM

## 2021-06-18 DIAGNOSIS — N18.4 STAGE 4 CHRONIC KIDNEY DISEASE (HCC): ICD-10-CM

## 2021-06-18 PROCEDURE — 99215 OFFICE O/P EST HI 40 MIN: CPT | Performed by: STUDENT IN AN ORGANIZED HEALTH CARE EDUCATION/TRAINING PROGRAM

## 2021-06-18 RX ORDER — ATORVASTATIN CALCIUM 20 MG/1
20 TABLET, FILM COATED ORAL DAILY
Qty: 90 TABLET | Refills: 1 | Status: SHIPPED | OUTPATIENT
Start: 2021-06-18 | End: 2021-12-28

## 2021-06-18 RX ORDER — COLCHICINE 0.6 MG/1
0.6 TABLET ORAL DAILY
Status: CANCELLED | OUTPATIENT
Start: 2021-06-18

## 2021-06-18 NOTE — PROGRESS NOTES
"Subjective:  Pennie Gibson is a 76 y.o. female who presents for     Dysuria; states has intermittent dysuria. Was previously treated with Keflex.  Initially was worse before, does have history of bladder surgery, unclear what they did, states that they just put it back.  This was back in the 90s.  No fever, chill, abdominal pain, genital discharge, nausea, vomiting, altered mentation has appoint with urology next month.  [facility] doctor in [place] did not do a  exam or in same-day clinic previously.    HLD; denies ever being on a statin medication, last lipid panel with LDL greater than 70, history of coronary artery disease, pacemaker.  States blood pressures been well controlled, no chest pain, shortness of breath, exercise intolerance, leg swelling, dental pain.    Gout; has not had an episode for 15 years, previous plan was decreased from 300 mg daily to 50 mg daily due to CKD.     Patient Active Problem List   Diagnosis   • Seasonal allergies   • Acute UTI   • Congestive heart failure (CMS/HCC)   • Nonrheumatic mitral valve regurgitation   • Nonrheumatic tricuspid valve regurgitation   • Sick sinus syndrome (CMS/HCC)   • Cardiac pacemaker in situ   • Atrial fibrillation (CMS/HCC)     Vitals:    Vitals:    06/18/21 0901   BP: 105/60   BP Location: Right arm   Patient Position: Sitting   Cuff Size: Adult   Pulse: 70   Temp: 97.1 °F (36.2 °C)   SpO2: 98%   Weight: 77.6 kg (171 lb)   Height: 152.4 cm (60\")     Body mass index is 33.4 kg/m².      Current Outpatient Medications:   •  albuterol sulfate  (90 Base) MCG/ACT inhaler, Inhale 2 puffs Every 4 (Four) Hours As Needed for Wheezing., Disp: 18 g, Rfl: 6  •  aspirin (aspirin) 81 MG EC tablet, Take 1 tablet by mouth Daily., Disp: 90 tablet, Rfl: 1  •  calcium carbonate (OS-MICHELL) 600 MG tablet, Take 600 mg by mouth Daily., Disp: , Rfl:   •  Cranberry 1000 MG capsule, Take 4,200 mg by mouth Daily., Disp: , Rfl:   •  Garlic 1000 MG capsule, Take 1,000 mg by " mouth Daily., Disp: , Rfl:   •  metoprolol tartrate (LOPRESSOR) 100 MG tablet, Take 1 tablet by mouth 2 (Two) Times a Day., Disp: 180 tablet, Rfl: 1  •  rivaroxaban (XARELTO) 20 MG tablet, Take 1 tablet by mouth Daily., Disp: 90 tablet, Rfl: 1  •  vitamin B-12 (CYANOCOBALAMIN) 1000 MCG tablet, Take 1 tablet by mouth Daily., Disp: 90 tablet, Rfl: 1  •  atorvastatin (LIPITOR) 20 MG tablet, Take 1 tablet by mouth Daily., Disp: 90 tablet, Rfl: 1    Patient Active Problem List   Diagnosis   • Seasonal allergies   • Acute UTI   • Congestive heart failure (CMS/HCC)   • Nonrheumatic mitral valve regurgitation   • Nonrheumatic tricuspid valve regurgitation   • Sick sinus syndrome (CMS/HCC)   • Cardiac pacemaker in situ   • Atrial fibrillation (CMS/HCC)     Past Surgical History:   Procedure Laterality Date   • BLADDER SURGERY     • CARDIAC SURGERY     • COLON RESECTION     • PACEMAKER IMPLANTATION     • VAGINAL BIOPSY       Social History     Socioeconomic History   • Marital status:      Spouse name: Not on file   • Number of children: Not on file   • Years of education: Not on file   • Highest education level: Not on file   Tobacco Use   • Smoking status: Former Smoker     Packs/day: 2.00     Years: 45.00     Pack years: 90.00   • Smokeless tobacco: Never Used   Vaping Use   • Vaping Use: Never used   Substance and Sexual Activity   • Alcohol use: Not Currently     Comment: quit around her mid 50s   • Drug use: Never   • Sexual activity: Defer     Family History   Problem Relation Age of Onset   • Cancer Mother    • Diabetes Father    • Diabetes Sister    • Diabetes Brother      Lab on 06/03/2021   Component Date Value Ref Range Status   • Color, UA 06/03/2021 Yellow  Yellow, Straw, Dark Yellow, Nayana Final   • Appearance, UA 06/03/2021 Turbid* Clear Final   • pH, UA 06/03/2021 6.0  5.0 - 9.0 Final   • Specific Gravity, UA 06/03/2021 1.012  1.003 - 1.030 Final   • Glucose, UA 06/03/2021 Negative  Negative Final   •  Ketones, UA 06/03/2021 Negative  Negative Final   • Bilirubin, UA 06/03/2021 Negative  Negative Final   • Blood, UA 06/03/2021 Small (1+)* Negative Final   • Protein, UA 06/03/2021 Negative  Negative Final   • Leuk Esterase, UA 06/03/2021 Large (3+)* Negative Final   • Nitrite, UA 06/03/2021 Negative  Negative Final   • Urobilinogen, UA 06/03/2021 0.2 E.U./dL  0.2 - 1.0 E.U./dL Final   • RBC, UA 06/03/2021 6-12* None Seen /HPF Final   • WBC, UA 06/03/2021 Too Numerous to Count* None Seen, 0-2, 3-5 /HPF Final   • Bacteria, UA 06/03/2021 1+* None Seen /HPF Final   • Squamous Epithelial Cells, UA 06/03/2021 3-5* None Seen, 0-2 /HPF Final   • Hyaline Casts, UA 06/03/2021 7-12  None Seen /LPF Final   • Methodology 06/03/2021 Automated Microscopy   Final   • Urine Culture 06/03/2021 25,000 CFU/mL Pseudomonas fluorescens*  Final   Office Visit on 06/02/2021   Component Date Value Ref Range Status   • QT Interval 06/02/2021 452  ms Final   • QTC Interval 06/02/2021 452  ms Final   Office Visit on 05/21/2021   Component Date Value Ref Range Status   • Color 05/21/2021 Yellow  Yellow, Straw, Dark Yellow, Nayana Final   • Clarity, UA 05/21/2021 Cloudy* Clear Final   • Specific Gravity  05/21/2021 1.020  1.005 - 1.030 Final   • pH, Urine 05/21/2021 6.0  5.0 - 8.0 Final   • Leukocytes 05/21/2021 Large (3+)* Negative Final   • Nitrite, UA 05/21/2021 Negative  Negative Final   • Protein, POC 05/21/2021 Trace* Negative mg/dL Final   • Glucose, UA 05/21/2021 Negative  Negative, 1000 mg/dL (3+) mg/dL Final   • Ketones, UA 05/21/2021 Negative  Negative Final   • Urobilinogen, UA 05/21/2021 Normal  Normal Final   • Bilirubin 05/21/2021 Negative  Negative Final   • Blood, UA 05/21/2021 Trace* Negative Final   • Urine Culture 05/21/2021 50,000 CFU/mL Escherichia coli*  Final   Office Visit on 05/17/2021   Component Date Value Ref Range Status   • Uric Acid 05/17/2021 5.6  2.4 - 5.7 mg/dL Final   • Glucose 05/17/2021 77  65 - 99 mg/dL  Final   • BUN 05/17/2021 20  8 - 23 mg/dL Final   • Creatinine 05/17/2021 2.04* 0.57 - 1.00 mg/dL Final   • Sodium 05/17/2021 141  136 - 145 mmol/L Final   • Potassium 05/17/2021 4.3  3.5 - 5.2 mmol/L Final   • Chloride 05/17/2021 104  98 - 107 mmol/L Final   • CO2 05/17/2021 25.0  22.0 - 29.0 mmol/L Final   • Calcium 05/17/2021 9.7  8.6 - 10.5 mg/dL Final   • Total Protein 05/17/2021 6.7  6.0 - 8.5 g/dL Final   • Albumin 05/17/2021 3.80  3.50 - 5.20 g/dL Final   • ALT (SGPT) 05/17/2021 10  1 - 33 U/L Final   • AST (SGOT) 05/17/2021 16  1 - 32 U/L Final   • Alkaline Phosphatase 05/17/2021 69  39 - 117 U/L Final   • Total Bilirubin 05/17/2021 0.3  0.0 - 1.2 mg/dL Final   • eGFR Non African Amer 05/17/2021 24* >60 mL/min/1.73 Final   • Globulin 05/17/2021 2.9  gm/dL Final   • A/G Ratio 05/17/2021 1.3  g/dL Final   • BUN/Creatinine Ratio 05/17/2021 9.8  7.0 - 25.0 Final   • Anion Gap 05/17/2021 12.0  5.0 - 15.0 mmol/L Final   • Vitamin B-12 05/17/2021 697  211 - 946 pg/mL Final   • Total Cholesterol 05/17/2021 148  0 - 200 mg/dL Final   • Triglycerides 05/17/2021 136  0 - 150 mg/dL Final   • HDL Cholesterol 05/17/2021 33* 40 - 60 mg/dL Final   • LDL Cholesterol  05/17/2021 91  0 - 100 mg/dL Final   • VLDL Cholesterol 05/17/2021 24  5 - 40 mg/dL Final   • LDL/HDL Ratio 05/17/2021 2.66   Final   • WBC 05/17/2021 11.97* 3.40 - 10.80 10*3/mm3 Final   • RBC 05/17/2021 5.09  3.77 - 5.28 10*6/mm3 Final   • Hemoglobin 05/17/2021 14.3  12.0 - 15.9 g/dL Final   • Hematocrit 05/17/2021 42.3  34.0 - 46.6 % Final   • MCV 05/17/2021 83.1  79.0 - 97.0 fL Final   • MCH 05/17/2021 28.1  26.6 - 33.0 pg Final   • MCHC 05/17/2021 33.8  31.5 - 35.7 g/dL Final   • RDW 05/17/2021 13.5  12.3 - 15.4 % Final   • RDW-SD 05/17/2021 39.6  37.0 - 54.0 fl Final   • MPV 05/17/2021 10.4  6.0 - 12.0 fL Final   • Platelets 05/17/2021 374  140 - 450 10*3/mm3 Final   • Neutrophil % 05/17/2021 52.5  42.7 - 76.0 % Final   • Lymphocyte % 05/17/2021 35.4   19.6 - 45.3 % Final   • Monocyte % 05/17/2021 10.1  5.0 - 12.0 % Final   • Eosinophil % 05/17/2021 2.0  0.3 - 6.2 % Final   • Neutrophils Absolute 05/17/2021 6.28  1.70 - 7.00 10*3/mm3 Final   • Lymphocytes Absolute 05/17/2021 4.24* 0.70 - 3.10 10*3/mm3 Final   • Monocytes Absolute 05/17/2021 1.21* 0.10 - 0.90 10*3/mm3 Final   • Eosinophils Absolute 05/17/2021 0.24  0.00 - 0.40 10*3/mm3 Final   • Ovalocytes 05/17/2021 Slight/1+  None Seen Final   • Poikilocytes 05/17/2021 Slight/1+  None Seen Final   • WBC Morphology 05/17/2021 Normal  Normal Final   • Platelet Morphology 05/17/2021 Normal  Normal Final      No image results found.      [unfilled]  Immunization History   Administered Date(s) Administered   • Influenza, Unspecified 01/10/2018   • Pneumococcal, Unspecified 01/01/2013     The following portions of the patient's history were reviewed and updated as appropriate: allergies, current medications, past family history, past medical history, past social history, past surgical history and problem list.    PHQ-9 Total Score: 0           Physical Exam  Exam conducted with a chaperone present.   Constitutional:       Appearance: Normal appearance.   HENT:      Head: Normocephalic and atraumatic.      Right Ear: External ear normal.      Left Ear: External ear normal.   Eyes:      General:         Right eye: No discharge.         Left eye: No discharge.      Conjunctiva/sclera: Conjunctivae normal.   Cardiovascular:      Rate and Rhythm: Normal rate and regular rhythm.      Pulses: Normal pulses.      Heart sounds: Normal heart sounds. No murmur heard.     Pulmonary:      Effort: Pulmonary effort is normal. No respiratory distress.      Breath sounds: Normal breath sounds.   Abdominal:      General: There is no distension.      Palpations: Abdomen is soft.      Tenderness: There is no abdominal tenderness.   Genitourinary:     General: Normal vulva.      Exam position: Lithotomy position.      Labia:          Right: No rash or lesion.         Left: No rash or tenderness.       Urethra: No prolapse, urethral swelling or urethral lesion.   Musculoskeletal:      Cervical back: Normal range of motion.      Right lower leg: No edema.      Left lower leg: No edema.   Lymphadenopathy:      Cervical: No cervical adenopathy.   Neurological:      Mental Status: She is alert. Mental status is at baseline.   Psychiatric:         Mood and Affect: Mood normal.         Behavior: Behavior normal.         Assessment/Plan    Diagnosis Plan   1. Hyperlipidemia, unspecified hyperlipidemia type  atorvastatin (LIPITOR) 20 MG tablet   2. Stage 4 chronic kidney disease (CMS/HCC)  Basic metabolic panel   3. UTI symptoms        Orders Placed This Encounter   Procedures   • Basic metabolic panel     Standing Status:   Future     Standing Expiration Date:   6/18/2022     Order Specific Question:   Release to patient     Answer:   Immediate     Frequent UTIs; benign exam today, intermittent symptoms, no fever, chills, abdominal pain.   exam reassuring, no prolapse, lesions noted.  Will await urology consultation, consider vaginal estrogen cream if unimproved.  Discussed importance of hydration, patient does have history of CHF however very well controlled, last EF was normal, not on a diuretic.    Kidney disease; last BMP significant for stage IV kidney disease, discussed importance of hydration, will stop allopurinol as patient has not had a gouty flare in over 15 years.  Consider colchicine for cardiac secondary prevention and gout management.  However after prolonged discussion, will refrain from adding colchicine at this time due to kidney disease.    Hyperlipidemia; has never been on atorvastatin past, will start low-dose atorvastatin today.  Counseled on possible adverse effects, agreed with plan.  Follow-up in 3 months, sooner if needed.    Total time examining evaluating the patient, completing orders and counseling was 42min        This  document has been electronically signed by Isaac Love MD on June 18, 2021 14:13 CDT

## 2021-07-06 ENCOUNTER — LAB (OUTPATIENT)
Dept: LAB | Facility: HOSPITAL | Age: 76
End: 2021-07-06

## 2021-07-06 DIAGNOSIS — N18.4 STAGE 4 CHRONIC KIDNEY DISEASE (HCC): ICD-10-CM

## 2021-07-06 LAB
ANION GAP SERPL CALCULATED.3IONS-SCNC: 9.4 MMOL/L (ref 5–15)
BUN SERPL-MCNC: 17 MG/DL (ref 8–23)
BUN/CREAT SERPL: 14 (ref 7–25)
CALCIUM SPEC-SCNC: 9.4 MG/DL (ref 8.6–10.5)
CHLORIDE SERPL-SCNC: 106 MMOL/L (ref 98–107)
CO2 SERPL-SCNC: 24.6 MMOL/L (ref 22–29)
CREAT SERPL-MCNC: 1.21 MG/DL (ref 0.57–1)
GFR SERPL CREATININE-BSD FRML MDRD: 43 ML/MIN/1.73
GLUCOSE SERPL-MCNC: 84 MG/DL (ref 65–99)
POTASSIUM SERPL-SCNC: 4.8 MMOL/L (ref 3.5–5.2)
SODIUM SERPL-SCNC: 140 MMOL/L (ref 136–145)

## 2021-07-06 PROCEDURE — 80048 BASIC METABOLIC PNL TOTAL CA: CPT

## 2021-07-20 DIAGNOSIS — H93.8X3 EAR FULLNESS, BILATERAL: Primary | ICD-10-CM

## 2021-07-30 ENCOUNTER — OFFICE VISIT (OUTPATIENT)
Dept: CARDIOLOGY | Facility: CLINIC | Age: 76
End: 2021-07-30

## 2021-07-30 VITALS
OXYGEN SATURATION: 98 % | HEART RATE: 60 BPM | HEIGHT: 60 IN | WEIGHT: 176 LBS | DIASTOLIC BLOOD PRESSURE: 86 MMHG | TEMPERATURE: 97.5 F | BODY MASS INDEX: 34.55 KG/M2 | SYSTOLIC BLOOD PRESSURE: 122 MMHG

## 2021-07-30 DIAGNOSIS — I50.9 CONGESTIVE HEART FAILURE, UNSPECIFIED HF CHRONICITY, UNSPECIFIED HEART FAILURE TYPE (HCC): Primary | ICD-10-CM

## 2021-07-30 DIAGNOSIS — I49.5 SICK SINUS SYNDROME (HCC): ICD-10-CM

## 2021-07-30 DIAGNOSIS — I34.0 NONRHEUMATIC MITRAL VALVE REGURGITATION: ICD-10-CM

## 2021-07-30 DIAGNOSIS — I48.0 PAROXYSMAL ATRIAL FIBRILLATION (HCC): ICD-10-CM

## 2021-07-30 DIAGNOSIS — I36.1 NONRHEUMATIC TRICUSPID VALVE REGURGITATION: ICD-10-CM

## 2021-07-30 LAB
QT INTERVAL: 448 MS
QTC INTERVAL: 448 MS

## 2021-07-30 PROCEDURE — 99214 OFFICE O/P EST MOD 30 MIN: CPT | Performed by: INTERNAL MEDICINE

## 2021-07-30 PROCEDURE — 93000 ELECTROCARDIOGRAM COMPLETE: CPT | Performed by: INTERNAL MEDICINE

## 2021-07-30 NOTE — PROGRESS NOTES
Pennie Gibson  76 y.o. female    07/30/2021  1. Congestive heart failure, unspecified HF chronicity, unspecified heart failure type (CMS/HCC)    2. Nonrheumatic mitral valve regurgitation    3. Nonrheumatic tricuspid valve regurgitation    4. Sick sinus syndrome (CMS/HCC)    5. Paroxysmal atrial fibrillation (CMS/HCC)        History of Present Illness:  Body mass index is 34.37 kg/m². BMI is above normal parameters. Recommendations include: exercise counseling, nutrition counseling and referral to primary care.        Arthur is a 76-year-old  female presented to establish care with our office recently evaluated by Vanda Rodriguez.  The patient appropriate for age good mental status good attitude no symptom system cardiac decompensation such orthopnea PND intermittent claudication bleeding disorder reported by the patient.  He moved to this area to live with her daughter, previously she lived in [place].  Complex past medical history of gout (chronic, managed with allopurinol), COPD (chronic), history of atrial fibrillation (OAC with Xarelto) , CAD, hypertension, hyperlipidemia,  gout, obesity, stage III kidney disease, CVA, sick sinus syndrome status post PPM in 2005 with atrial lead placement in 2015 , bifascicular block, aortic valve calcification, mild AR, moderate MR and TR. In reviewing her records it appears she underwent myocardial perfusion imaging in light 2020 which showed apical perfusion defect, small with indeterminate criteria for ischemia.  Patient was recommended for continued medical management.  She has had 3 previous heart cath in 2008, 2010 which showed 30% mild CAD noted to the RCA.  Last left heart catheterization in April 2016, patient denies previous stent placement.  Last echocardiogram in July 2020 showing aortic calcification with mild aortic regurgitation, mild mitral and tricuspid regurgitation, mild pulmonic regurgitation, borderline biatrial enlargement, normal LVEF at 65%.  Patient has a [employer] permanent pacemaker last interrogated in November 2020 showing normal device function with battery life of 1.5 years.    Lipid May 2021      Total Cholesterol   0 - 200 mg/dL 148    Triglycerides   0 - 150 mg/dL 136    HDL Cholesterol   40 - 60 mg/dL 33Low     LDL Cholesterol    0 - 100 mg/dL 91    VLDL Cholesterol   5 - 40 mg/dL 24    LDL/HDL Ratio  2.66          SUBJECTIVE:    Allergies   Allergen Reactions   • Penicillins Rash         Past Medical History:   Diagnosis Date   • Asthma    • CHF (congestive heart failure) (CMS/[facility])    • COPD (chronic obstructive pulmonary disease) (CMS/[facility])    • Pacemaker          Past Surgical History:   Procedure Laterality Date   • BLADDER SURGERY     • CARDIAC SURGERY     • COLON RESECTION     • PACEMAKER IMPLANTATION     • VAGINAL BIOPSY           Family History   Problem Relation Age of Onset   • Cancer Mother    • Diabetes Father    • Diabetes Sister    • Diabetes Brother          Social History     Socioeconomic History   • Marital status:      Spouse name: Not on file   • Number of children: Not on file   • Years of education: Not on file   • Highest education level: Not on file   Tobacco Use   • Smoking status: Former Smoker     Packs/day: 2.00     Years: 45.00     Pack years: 90.00   • Smokeless tobacco: Never Used   Vaping Use   • Vaping Use: Never used   Substance and Sexual Activity   • Alcohol use: Not Currently     Comment: quit around her mid 50s   • Drug use: Never   • Sexual activity: Defer         Current Outpatient Medications   Medication Sig Dispense Refill   • albuterol sulfate  (90 Base) MCG/ACT inhaler Inhale 2 puffs Every 4 (Four) Hours As Needed for Wheezing. 18 g 6   • aspirin (aspirin) 81 MG EC tablet Take 1 tablet by mouth Daily. 90 tablet 1   • atorvastatin (LIPITOR) 20 MG tablet Take 1 tablet by mouth Daily. 90 tablet 1   • calcium carbonate (OS-MICHELL) 600 MG tablet Take 600 mg by mouth Daily.     •  "Cranberry 1000 MG capsule Take 4,200 mg by mouth Daily.     • Garlic 1000 MG capsule Take 1,000 mg by mouth Daily.     • metoprolol tartrate (LOPRESSOR) 100 MG tablet Take 1 tablet by mouth 2 (Two) Times a Day. 180 tablet 1   • rivaroxaban (XARELTO) 20 MG tablet Take 1 tablet by mouth Daily. 90 tablet 1   • vitamin B-12 (CYANOCOBALAMIN) 1000 MCG tablet Take 1 tablet by mouth Daily. 90 tablet 1     No current facility-administered medications for this visit.           Review of Systems:     Constitutional:  Denies recent weight loss, weight gain,no change in exercise tolerance.     HENT:  Denies any hearing loss, epistaxisEyes: No blurring    Respiratory: Severe COPD mild baseline shortness    Cardiovascular: See H&P    Gastrointestinal:  Denies change in bowel habits and dyspepsia    Endocrine: Negative for cold intolerance, heat intolerance, polydipsia    Genitourinary: Negative.      Musculoskeletal: History of osteoarthritis    Skin:  Deniesrashes, or skin lesions.     Allergic/Immunologic: Negative.  Negative for environmental allergies    Neurological:  Denies any history of recurrent headaches, strokes,     Hematological: Denies any food allergies, seasonal allergies    Psychiatric/Behavioral: Denies any history of depression        OBJECTIVE:    /86 (BP Location: Left arm, Patient Position: Sitting, Cuff Size: Adult)   Pulse 60   Temp 97.5 °F (36.4 °C)   Ht 152.4 cm (60\")   Wt 79.8 kg (176 lb)   SpO2 98%   BMI 34.37 kg/m²     Physical Exam:     Constitutional: Cooperative, alert and oriented, well-developed, well-nourished, in no acute distress.     HENT:   Head: Normocephalic, conjunctive is a pink, thyroid is nonpalpable no carotid bruit and trachea central.     Cardiovascular: Regular rhythm, S1 and S2 normal, no S3 or S4. Apical impulse not displaced. No murmurs    Pulmonary/Chest: Chest: No chest wall tenderness no rales and wheezing    Abdominal: Abdomen soft, bowel sounds normoactive, no " masses,    Musculoskeletal: No deformities, clubbing, cyanosis, erythema. positive mild edema  Neurological: No gross motor or sensory deficits noted    Skin: Warm and dry to the touch, no apparent skin lesions .     Psychiatric: He has a normal mood and affect. His behavior is normal        Procedures      Lab Results   Component Value Date    WBC 11.97 (H) 05/17/2021    HGB 14.3 05/17/2021    HCT 42.3 05/17/2021    MCV 83.1 05/17/2021     05/17/2021     Lab Results   Component Value Date    GLUCOSE 84 07/06/2021    BUN 17 07/06/2021    CREATININE 1.21 (H) 07/06/2021    EGFRIFNONA 43 (L) 07/06/2021    BCR 14.0 07/06/2021    CO2 24.6 07/06/2021    CALCIUM 9.4 07/06/2021    ALBUMIN 3.80 05/17/2021    AST 16 05/17/2021    ALT 10 05/17/2021     Lab Results   Component Value Date    CHOL 148 05/17/2021     Lab Results   Component Value Date    TRIG 136 05/17/2021     Lab Results   Component Value Date    HDL 33 (L) 05/17/2021     No components found for: LDLCALC  Lab Results   Component Value Date    LDL 91 05/17/2021     No results found for: HDLLDLRATIO  No components found for: CHOLHDL  No results found for: HGBA1C  No results found for: TSH, D0NHIQJ, R9ZKDGE, THYROIDAB        ASSESSMENT AND PLAN:    #1 chronic congestive heart failure due to preserved left and systolic      76-year-old years old patient with history of hypertension with hypertensive heart disease, diastolic dysfunction relaxation abnormality abnormality in preserved left ventricle systolic function no previous echocardiogram.  She was evaluated with Dr. Vanda Rodriguez and referred to establish her care.    Patient clinically well perfused, euvolemic and no evidence of congestive heart failure at the time of evaluations.  Her blood pressure is reasonably well controlled and she will continue metoprolol had history of chronic renal insufficiency.  Echocardiogram was arranged to reassess the biventricular function and valvular status    #2 sick  sinus syndrome s/p pacemaker  [employer] battery voltage good for 1.5 years on recent evaluations      3 paroxysmal atrial fibrillation continue metoprolol     sinus rhythm confirmed by EKG patient [facility] and conducted the ventricle    [id number] score is 4 continue oral anticoagulation to decrease risk of cardiac embolization        #4 nonrheumatic mitral regurgitation    Clinically there is no evidence of progression at the time of evaluation echocardiogram study arranged to reassess to evaluate status of biventricular function    #5 history of nonobstructive CAD continue aspirin and atorvastatin      Preventive    Obesity with a BMI of 34.3      Significantly low carbohydrate, low-fat, DASH diet graded exercise discussed with the patient        Diagnoses and all orders for this visit:    1. Congestive heart failure, unspecified HF chronicity, unspecified heart failure type (CMS/HCC) (Primary)  -     ECG 12 Lead    2. Nonrheumatic mitral valve regurgitation    3. Nonrheumatic tricuspid valve regurgitation    4. Sick sinus syndrome (CMS/HCC)    5. Paroxysmal atrial fibrillation (CMS/HCC)          Shiloh Medrano MD  7/30/2021  08:44 CDT

## 2021-08-06 ENCOUNTER — CLINICAL SUPPORT (OUTPATIENT)
Dept: AUDIOLOGY | Facility: CLINIC | Age: 76
End: 2021-08-06

## 2021-08-06 ENCOUNTER — OFFICE VISIT (OUTPATIENT)
Dept: OTOLARYNGOLOGY | Facility: CLINIC | Age: 76
End: 2021-08-06

## 2021-08-06 VITALS — HEART RATE: 63 BPM | BODY MASS INDEX: 33.89 KG/M2 | WEIGHT: 172.6 LBS | OXYGEN SATURATION: 93 % | HEIGHT: 60 IN

## 2021-08-06 DIAGNOSIS — H90.3 SENSORINEURAL HEARING LOSS, BILATERAL: Primary | ICD-10-CM

## 2021-08-06 DIAGNOSIS — H93.13 TINNITUS OF BOTH EARS: ICD-10-CM

## 2021-08-06 DIAGNOSIS — H93.13 TINNITUS, BILATERAL: ICD-10-CM

## 2021-08-06 DIAGNOSIS — H93.8X3 POPPING OF BOTH EARS: ICD-10-CM

## 2021-08-06 DIAGNOSIS — H93.8X3 SENSATION OF FULLNESS IN BOTH EARS: ICD-10-CM

## 2021-08-06 DIAGNOSIS — M26.623 BILATERAL TEMPOROMANDIBULAR JOINT PAIN: ICD-10-CM

## 2021-08-06 PROCEDURE — 92567 TYMPANOMETRY: CPT | Performed by: AUDIOLOGIST

## 2021-08-06 PROCEDURE — 92557 COMPREHENSIVE HEARING TEST: CPT | Performed by: AUDIOLOGIST

## 2021-08-06 PROCEDURE — 99203 OFFICE O/P NEW LOW 30 MIN: CPT | Performed by: OTOLARYNGOLOGY

## 2021-08-06 RX ORDER — DOXYCYCLINE HYCLATE 100 MG/1
CAPSULE ORAL
COMMUNITY
Start: 2021-08-03 | End: 2021-12-20
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
## 2021-08-06 NOTE — PROGRESS NOTES
STANDARD AUDIOMETRIC EVALUATION      Name:  Pennie Gibson  :  1945  Age:  76 y.o.  Date of Evaluation:  2021      HISTORY    Reason for visit:  Pennie Gibson is seen today for a hearing test at the request of Dr. Brian Rocha.  Patient reports her hearing is getting worse.  She states she has had ringing in both ears for years.  She states both ears have popping and feeling of fullness.      EVALUATION    See Audiogram    RESULTS        Otoscopy and Tympanometry 226 Hz :  Right Ear:  Otoscopy:  Testing completed after ears were examined by the ENT physician          Tympanometry:  Middle ear function within normal limits    Left Ear:   Otoscopy:  Testing completed after ears were examined by the ENT physician        Tympanometry:  Middle ear function within normal limits    Test technique:  Standard Audiometry     Pure Tone Audiometry:   Patient responded to pure tones at 35-70 dB for 250-8000 Hz in right ear, and at 40-65 dB for 250-8000 Hz in left ear.       Speech Audiometry:        Right Ear:  Speech Reception Threshold (SRT) was obtained at 50 dBHL                 Speech Discrimination scores were 52% in quiet when words were presented at 85 dBHL       Left Ear:  Speech Reception Threshold (SRT) was obtained at 50 dBHL                 Speech Discrimination scores were 40% in quiet when words were presented at 85 dBHL    Reliability:   good    IMPRESSIONS:  1.  Tympanometry results are consistent with Middle ear function within normal limits in both ears.  2.  Pure tone results are consistent with mild to moderate sloping sensorineural hearing loss  for both ears.       RECOMMENDATIONS:  Patient is seeing the Ear Nose and Throat physician immediately following this examination.  It was a pleasure seeing Pennie Gibson in Audiology today.  We would be happy to do further testing or discuss these test as necessary.          This document has been electronically signed by Giselle العلي  MS CHARLES Larson-A on August 6, 2021 16:34 CDT       Giselle Larson MS CCC-A  Licensed Audiologist

## 2021-08-06 NOTE — PATIENT INSTRUCTIONS
Soft diet  See dentist  Avoid loud noises  See audiologist about hearing aid  Follow-up 6 months or as needed

## 2021-09-08 ENCOUNTER — CLINICAL SUPPORT (OUTPATIENT)
Dept: CARDIOLOGY | Facility: CLINIC | Age: 76
End: 2021-09-08

## 2021-09-08 DIAGNOSIS — I48.91 ATRIAL FIBRILLATION, UNSPECIFIED TYPE (HCC): Primary | ICD-10-CM

## 2021-09-08 DIAGNOSIS — Z95.0 CARDIAC PACEMAKER IN SITU: ICD-10-CM

## 2021-09-08 DIAGNOSIS — I49.5 SICK SINUS SYNDROME (HCC): Primary | ICD-10-CM

## 2021-09-08 PROCEDURE — 93280 PM DEVICE PROGR EVAL DUAL: CPT | Performed by: NURSE PRACTITIONER

## 2021-09-08 NOTE — PROGRESS NOTES
Pacemaker Evaluation Report    September 8, 2021    Primary Cardiologist: Dr. Medrano  Implanting MD: unknown  :Diaferon Model: Cherry K173  Serial Number: [id number]  Implant date: 6/12/2013    Reason for evaluation:routine  Cardiac device indication(s): sick sinus syndrome    Battery  LULU: 4 months     Interrogation Results  Atrial sensing: P wave: paced  Atrial capture: 0.7 V @ 0.4 ms   Atrial lead impedance: 486 ohms  Ventricular sensing: R wave: 17 mV  Ventricular capture: 0.5 V @ 0.4 ms   Ventricular lead impedance: right  472 ohms    Parameters  Mode: DDD  Base Rate: 60/130    Diagnostic Data  Atrial paced: 42 %   Ventricular paced: <1 %  Mode switch: <1%  AT/AF [place]: <1%  AHR:   VHR: 124 AF with RVR    Intrinsic Rate: 32    Changes made: Changed RA output to 1.5V  4 months to LULU, recheck in 2 months.   9.8 hours AF    Conclusions: normal device function    Assessment:  1. Sick sinus syndrome (CMS/HCC)    2. Cardiac pacemaker in situ    - eliquis          This document has been electronically signed by DEN David on September 8, 2021 15:34 CDT

## 2021-09-10 ENCOUNTER — CLINICAL SUPPORT (OUTPATIENT)
Dept: AUDIOLOGY | Facility: CLINIC | Age: 76
End: 2021-09-10

## 2021-09-10 DIAGNOSIS — Z71.89 ENCOUNTER FOR HEARING AID CONSULTATION: Primary | ICD-10-CM

## 2021-09-10 PROCEDURE — HEARINGNOCHG: Performed by: AUDIOLOGIST

## 2021-09-10 NOTE — PROGRESS NOTES
HEARING AID CONSULT    Name:  Pennie Gibson  :  1945  Age:  76 y.o.  Date of Evaluation:  9/10/2021      HISTORY    Reason for visit:  Pennie Gibson is seen today for a hearing aid consultation at the request of Dr. Brian Rocha.  A previous audiogram completed on 2021 shows mild to moderate sloping sensorineural hearing loss for both ears.  Patient reports she can't hear her , she can't hear on the phone, and she has to turn the TV up louder.    Hearing aid history:  Patient does not have hearing aids at this time. and Patient is reluctant to try hearing aids at this time.      RECOMMENDATIONS:  Test results were reviewed with patient, and all questions were answered at this time.  At this time, it was determined that her [employer] insurance works with the third party [employer] for hearing aid benefits.  Since we are not a provider with [facility], she was given the contact information to call them and find a hearing provider in her area.     It was a pleasure seeing Pennie Gibson in Audiology today.  I look forward to helping Ms. Gibson with her amplification needs.            This document has been electronically signed by Giselle Larson MS CCC-A on September 10, 2021 09:46 CDT       Giselle Larson MS CCC-A  Licensed Audiologist    For Billing and Coding:  Z71.89  Encounter for Hearing Aid Consultation - no charge

## 2021-11-10 ENCOUNTER — CLINICAL SUPPORT (OUTPATIENT)
Dept: CARDIOLOGY | Facility: CLINIC | Age: 76
End: 2021-11-10

## 2021-11-10 DIAGNOSIS — Z95.0 CARDIAC PACEMAKER IN SITU: ICD-10-CM

## 2021-11-10 DIAGNOSIS — I49.5 SICK SINUS SYNDROME (HCC): Primary | ICD-10-CM

## 2021-11-10 PROCEDURE — 93288 INTERROG EVL PM/LDLS PM IP: CPT | Performed by: NURSE PRACTITIONER

## 2021-11-10 NOTE — PROGRESS NOTES
Pacemaker Evaluation Report    November 10, 2021    Primary Cardiologist: Dr. Medrano  Implanting MD: unknown  :[employer] Model: [place] K173  Serial Number: [id number]  Implant date: 2013    Reason for evaluation:routine  Cardiac device indication(s): sick sinus syndrome    Battery  LULU: 5 months     Interrogation Results  Atrial sensin.5 mV  Atrial capture: 0.8 V @ 0.4 ms   Atrial lead impedance: 489 ohms  Ventricular sensing: R wave: 18.2 mV   Ventricular capture: 0.5 V @ 0.4 ms   Ventricular lead impedance: right  493 ohms    Parameters  Mode: DDD  Base Rate: 60/130    Diagnostic Data  Atrial paced: 51 %   Ventricular paced: <1 %  Mode switch: <1%  AT/AF [place]: <1%  AHR: 0  VHR: SVT 10 episodes, longest 23 sec.    Intrinsic Rate: <30    Changes made: None    Conclusions: normal device function. Follow up in 2 months for LULU check.    Assessment:  1. Sick sinus syndrome (HCC)    2. Cardiac pacemaker in situ    - eliquis          This document has been electronically signed by DEN David on November 10, 2021 13:20 CST

## 2021-12-08 ENCOUNTER — TELEPHONE (OUTPATIENT)
Dept: FAMILY MEDICINE CLINIC | Facility: CLINIC | Age: 76
End: 2021-12-08

## 2021-12-20 ENCOUNTER — OFFICE VISIT (OUTPATIENT)
Dept: FAMILY MEDICINE CLINIC | Facility: CLINIC | Age: 76
End: 2021-12-20

## 2021-12-20 VITALS
BODY MASS INDEX: 33.57 KG/M2 | HEIGHT: 60 IN | TEMPERATURE: 97.5 F | WEIGHT: 171 LBS | SYSTOLIC BLOOD PRESSURE: 126 MMHG | OXYGEN SATURATION: 96 % | DIASTOLIC BLOOD PRESSURE: 66 MMHG | HEART RATE: 63 BPM

## 2021-12-20 DIAGNOSIS — Z23 FLU VACCINE NEED: ICD-10-CM

## 2021-12-20 DIAGNOSIS — Z78.0 POSTMENOPAUSE: ICD-10-CM

## 2021-12-20 DIAGNOSIS — N18.31 STAGE 3A CHRONIC KIDNEY DISEASE (HCC): Primary | ICD-10-CM

## 2021-12-20 DIAGNOSIS — Z23 NEED FOR DIPHTHERIA-TETANUS-PERTUSSIS (TDAP) VACCINE: ICD-10-CM

## 2021-12-20 PROBLEM — N18.9 CHRONIC KIDNEY DISEASE: Status: ACTIVE | Noted: 2021-07-09

## 2021-12-20 PROBLEM — M10.9 GOUT: Status: ACTIVE | Noted: 2021-07-09

## 2021-12-20 PROCEDURE — 90471 IMMUNIZATION ADMIN: CPT | Performed by: STUDENT IN AN ORGANIZED HEALTH CARE EDUCATION/TRAINING PROGRAM

## 2021-12-20 PROCEDURE — 99214 OFFICE O/P EST MOD 30 MIN: CPT | Performed by: STUDENT IN AN ORGANIZED HEALTH CARE EDUCATION/TRAINING PROGRAM

## 2021-12-20 PROCEDURE — 90662 IIV NO PRSV INCREASED AG IM: CPT | Performed by: STUDENT IN AN ORGANIZED HEALTH CARE EDUCATION/TRAINING PROGRAM

## 2021-12-20 PROCEDURE — G0008 ADMIN INFLUENZA VIRUS VAC: HCPCS | Performed by: STUDENT IN AN ORGANIZED HEALTH CARE EDUCATION/TRAINING PROGRAM

## 2021-12-20 PROCEDURE — 90715 TDAP VACCINE 7 YRS/> IM: CPT | Performed by: STUDENT IN AN ORGANIZED HEALTH CARE EDUCATION/TRAINING PROGRAM

## 2021-12-20 RX ORDER — ALLOPURINOL 100 MG/1
100 TABLET ORAL DAILY
COMMUNITY
Start: 2021-08-13 | End: 2022-12-07 | Stop reason: SDUPTHER

## 2021-12-20 RX ORDER — LISINOPRIL 20 MG/1
10 TABLET ORAL DAILY
COMMUNITY
Start: 2021-08-13 | End: 2022-12-07 | Stop reason: SDUPTHER

## 2021-12-20 NOTE — PROGRESS NOTES
"Subjective:  Pennie Gibson is a 76 y.o. female who presents for     Gout; allopurinol reduce to 100 mg daily advertisement due to kidney disease, and the fact that she has not had a flare in over 15 years and has stage IV kidney disease.  States has had no issues without medication, no joint swelling, gout flares.    Hyperlipidemia; appears benign, will start on atorvastatin.  Plan medications well, no adverse effects.  Since last appointment has been seen by nephrology.  Underlying etiology of chronic kidney disease likely due to CHF, hypertension, NSAID use.  Due to proteinuria, was started on lisinopril 10 mg daily.  Additionally, lab significant for MGUS, was referred to hematology, has appointment next month.     CHF/sick sinus syndrome; currently on pacemaker, recently seen cardiology, instructed to continue aspirin, atorvastatin.  Recent lipid panel with LDL of 91.  Patient states has been doing well, no chest pain, palpitations, shortness of breath, orthopnea, leg swelling, weight gain. States needs to get battery replaced in 4 months.     Patient Active Problem List   Diagnosis   • Seasonal allergies   • Acute UTI   • Congestive heart failure (HCC)   • Nonrheumatic mitral valve regurgitation   • Nonrheumatic tricuspid valve regurgitation   • Sick sinus syndrome (HCC)   • Cardiac pacemaker in situ   • Atrial fibrillation (HCC)   • Gout   • Chronic kidney disease     Vitals:    Vitals:    12/20/21 0831   BP: 126/66   BP Location: Right arm   Patient Position: Sitting   Cuff Size: Adult   Pulse: 63   Temp: 97.5 °F (36.4 °C)   SpO2: 96%   Weight: 77.6 kg (171 lb)   Height: 152.4 cm (60\")     Body mass index is 33.4 kg/m².      Current Outpatient Medications:   •  albuterol sulfate  (90 Base) MCG/ACT inhaler, Inhale 2 puffs Every 4 (Four) Hours As Needed for Wheezing., Disp: 18 g, Rfl: 6  •  allopurinol (ZYLOPRIM) 100 MG tablet, Take 100 mg by mouth Daily., Disp: , Rfl:   •  apixaban (ELIQUIS) 5 MG " tablet tablet, Take 1 tablet by mouth Every 12 (Twelve) Hours., Disp: 60 tablet, Rfl: 1  •  aspirin (aspirin) 81 MG EC tablet, Take 1 tablet by mouth Daily., Disp: 90 tablet, Rfl: 1  •  atorvastatin (LIPITOR) 20 MG tablet, Take 1 tablet by mouth Daily., Disp: 90 tablet, Rfl: 1  •  calcium carbonate (OS-MICHELL) 600 MG tablet, Take 600 mg by mouth Daily., Disp: , Rfl:   •  Cranberry 1000 MG capsule, Take 4,200 mg by mouth Daily., Disp: , Rfl:   •  Garlic 1000 MG capsule, Take 1,000 mg by mouth Daily., Disp: , Rfl:   •  lisinopril (PRINIVIL,ZESTRIL) 20 MG tablet, Take 10 mg by mouth Daily., Disp: , Rfl:   •  metoprolol tartrate (LOPRESSOR) 100 MG tablet, Take 1 tablet by mouth 2 (Two) Times a Day., Disp: 180 tablet, Rfl: 1  •  vitamin B-12 (CYANOCOBALAMIN) 1000 MCG tablet, Take 1 tablet by mouth Daily., Disp: 90 tablet, Rfl: 1    Patient Active Problem List   Diagnosis   • Seasonal allergies   • Acute UTI   • Congestive heart failure (HCC)   • Nonrheumatic mitral valve regurgitation   • Nonrheumatic tricuspid valve regurgitation   • Sick sinus syndrome (HCC)   • Cardiac pacemaker in situ   • Atrial fibrillation (HCC)   • Gout   • Chronic kidney disease     Past Surgical History:   Procedure Laterality Date   • BLADDER SURGERY     • CARDIAC SURGERY     • COLON RESECTION     • PACEMAKER IMPLANTATION     • VAGINAL BIOPSY       Social History     Socioeconomic History   • Marital status:    Tobacco Use   • Smoking status: Former Smoker     Packs/day: 2.00     Years: 45.00     Pack years: 90.00   • Smokeless tobacco: Never Used   Vaping Use   • Vaping Use: Never used   Substance and Sexual Activity   • Alcohol use: Not Currently     Comment: quit around her mid 50s   • Drug use: Never   • Sexual activity: Defer     Family History   Problem Relation Age of Onset   • Cancer Mother    • Diabetes Father    • Diabetes Sister    • Diabetes Brother      Ancillary Procedure on 09/08/2021   Component Date Value Ref Range Status    • BSA 09/08/2021 1.8  m^2 Final   • RVIDd 09/08/2021 3.3  cm Final   • IVSd 09/08/2021 0.8  cm Final   • LVIDd 09/08/2021 4.5  cm Final   • LVIDs 09/08/2021 3.2  cm Final   • LVPWd 09/08/2021 0.7  cm Final   • IVS/LVPW 09/08/2021 1.1   Final   • FS 09/08/2021 28.9  % Final   • EDV(Teich) 09/08/2021 92.4  ml Final   • ESV(Teich) 09/08/2021 41.0  ml Final   • EF(Teich) 09/08/2021 55.7  % Final   • EDV(cubed) 09/08/2021 91.1  ml Final   • ESV(cubed) 09/08/2021 32.8  ml Final   • EF(cubed) 09/08/2021 64.0  % Final   • LV mass(C)d 09/08/2021 104.5  grams Final   • LV mass(C)dI 09/08/2021 59.7  grams/m^2 Final   • SV(Teich) 09/08/2021 51.5  ml Final   • SI(Teich) 09/08/2021 29.4  ml/m^2 Final   • SV(cubed) 09/08/2021 58.4  ml Final   • SI(cubed) 09/08/2021 33.3  ml/m^2 Final   • EPSS 09/08/2021 1.0  cm Final   • Ao root diam 09/08/2021 3.1  cm Final   • Ao root area 09/08/2021 7.5  cm^2 Final   • ACS 09/08/2021 2.1  cm Final   • LA dimension 09/08/2021 3.7  cm Final   • asc Aorta Diam 09/08/2021 3.5  cm Final   • LA/Ao 09/08/2021 1.2   Final   • LVOT diam 09/08/2021 1.9  cm Final   • LVOT area 09/08/2021 2.8  cm^2 Final   • LVOT area(traced) 09/08/2021 2.8  cm^2 Final   • LVLd ap4 09/08/2021 6.4  cm Final   • EDV(MOD-sp4) 09/08/2021 51.0  ml Final   • LVLs ap4 09/08/2021 6.0  cm Final   • ESV(MOD-sp4) 09/08/2021 27.0  ml Final   • EF(MOD-sp4) 09/08/2021 47.1  % Final   • LVLd ap2 09/08/2021 6.9  cm Final   • EDV(MOD-sp2) 09/08/2021 60.0  ml Final   • LVLs ap2 09/08/2021 6.0  cm Final   • ESV(MOD-sp2) 09/08/2021 33.0  ml Final   • EF(MOD-sp2) 09/08/2021 45.0  % Final   • SV(MOD-sp4) 09/08/2021 24.0  ml Final   • SI(MOD-sp4) 09/08/2021 13.7  ml/m^2 Final   • SV(MOD-sp2) 09/08/2021 27.0  ml Final   • SI(MOD-sp2) 09/08/2021 15.4  ml/m^2 Final   • Ao root area (BSA corrected) 09/08/2021 1.8   Final   • LV Najera Vol (BSA corrected) 09/08/2021 29.1  ml/m^2 Final   • LV Sys Vol (BSA corrected) 09/08/2021 15.4  ml/m^2 Final   •  MV E max trent 09/08/2021 68.6  cm/sec Final   • MV A max trent 09/08/2021 78.5  cm/sec Final   • MV E/A 09/08/2021 0.87   Final   • MV V2 max 09/08/2021 83.4  cm/sec Final   • MV max PG 09/08/2021 2.8  mmHg Final   • MV V2 mean 09/08/2021 48.7  cm/sec Final   • MV mean PG 09/08/2021 1.0  mmHg Final   • MV V2 VTI 09/08/2021 26.9  cm Final   • MVA(VTI) 09/08/2021 2.2  cm^2 Final   • MV P1/2t max trent 09/08/2021 82.7  cm/sec Final   • MV P1/2t 09/08/2021 106.2  msec Final   • MVA(P1/2t) 09/08/2021 2.1  cm^2 Final   • MV dec slope 09/08/2021 228.0  cm/sec^2 Final   • Ao pk trent 09/08/2021 139.0  cm/sec Final   • Ao max PG 09/08/2021 7.7  mmHg Final   • Ao max PG (full) 09/08/2021 3.9  mmHg Final   • Ao V2 mean 09/08/2021 91.8  cm/sec Final   • Ao mean PG 09/08/2021 4.0  mmHg Final   • Ao mean PG (full) 09/08/2021 2.0  mmHg Final   • Ao V2 VTI 09/08/2021 25.9  cm Final   • VICTORIA(I,A) 09/08/2021 2.3  cm^2 Final   • VICTORIA(I,D) 09/08/2021 2.3  cm^2 Final   • VICTORIA(V,A) 09/08/2021 2.0  cm^2 Final   • VICTORIA(V,D) 09/08/2021 2.0  cm^2 Final   • AI max trent 09/08/2021 303.0  cm/sec Final   • AI max PG 09/08/2021 36.7  mmHg Final   • AI dec slope 09/08/2021 140.0  cm/sec^2 Final   • AI P1/2t 09/08/2021 633.9  msec Final   • LV V1 max PG 09/08/2021 3.8  mmHg Final   • LV V1 mean PG 09/08/2021 2.0  mmHg Final   • LV V1 max 09/08/2021 97.8  cm/sec Final   • LV V1 mean 09/08/2021 64.3  cm/sec Final   • LV V1 VTI 09/08/2021 20.6  cm Final   • MR max trent 09/08/2021 454.0  cm/sec Final   • MR max PG 09/08/2021 82.4  mmHg Final   • SV(Ao) 09/08/2021 195.5  ml Final   • SI(Ao) 09/08/2021 111.7  ml/m^2 Final   • SV(LVOT) 09/08/2021 58.4  ml Final   • SI(LVOT) 09/08/2021 33.4  ml/m^2 Final   • PA V2 max 09/08/2021 73.3  cm/sec Final   • PA max PG 09/08/2021 2.1  mmHg Final   • PA max PG (full) 09/08/2021 -0.25  mmHg Final   • PA V2 mean 09/08/2021 52.2  cm/sec Final   • PA mean PG 09/08/2021 1.0  mmHg Final   • PA mean PG (full) 09/08/2021 0  mmHg Final    • PA V2 VTI 09/08/2021 15.6  cm Final   • RV V1 max PG 09/08/2021 2.4  mmHg Final   • RV V1 mean PG 09/08/2021 1.0  mmHg Final   • RV V1 max 09/08/2021 77.5  cm/sec Final   • RV V1 mean 09/08/2021 46.8  cm/sec Final   • RV V1 VTI 09/08/2021 17.1  cm Final   • TR max trent 09/08/2021 247.0  cm/sec Final   • RVSP(TR) 09/08/2021 32.4  mmHg Final   • RAP systole 09/08/2021 8.0  mmHg Final   • MVA P1/2T LCG 09/08/2021 2.7  cm^2 Final   • BH CV ECHO PAYTON - BZI_BMI 09/08/2021 33.6  kilograms/m^2 Final   • BH CV ECHO PAYTON - BSA([place]) 09/08/2021 1.9  m^2 Final   • BH CV ECHO PAYTON - BZI_METRIC_WEIGHT 09/08/2021 78.0  kg Final   • BH CV ECHO PAYTON - BZI_METRIC_HEIGHT 09/08/2021 152.4  cm Final   • BH CV VAS BP RIGHT ARM 09/08/2021 138/70  mmHg Final   • Echo EF Estimated 09/08/2021 61  % Final   Office Visit on 07/30/2021   Component Date Value Ref Range Status   • QT Interval 07/30/2021 448  ms Final   • QTC Interval 07/30/2021 448  ms Final   Lab on 07/06/2021   Component Date Value Ref Range Status   • Glucose 07/06/2021 84  65 - 99 mg/dL Final   • BUN 07/06/2021 17  8 - 23 mg/dL Final   • Creatinine 07/06/2021 1.21* 0.57 - 1.00 mg/dL Final   • Sodium 07/06/2021 140  136 - 145 mmol/L Final   • Potassium 07/06/2021 4.8  3.5 - 5.2 mmol/L Final   • Chloride 07/06/2021 106  98 - 107 mmol/L Final   • CO2 07/06/2021 24.6  22.0 - 29.0 mmol/L Final   • Calcium 07/06/2021 9.4  8.6 - 10.5 mg/dL Final   • eGFR Non African Amer 07/06/2021 43* >60 mL/min/1.73 Final   • BUN/Creatinine Ratio 07/06/2021 14.0  7.0 - 25.0 Final   • Anion Gap 07/06/2021 9.4  5.0 - 15.0 mmol/L Final      Adult Transthoracic Echo Complete w/ Color, Spectral and Contrast if Necessary Per Protocol  · Estimated left ventricular EF = 61% Left ventricular ejection fraction   appears to be 61 - 65%. Left ventricular systolic function is normal.  · Left ventricular diastolic function is consistent with (grade Ia w/high   LAP) impaired relaxation.  · Left atrial  volume is moderately increased.  · Moderate mitral valve regurgitation is present.  · Estimated right ventricular systolic pressure from tricuspid   regurgitation is normal (<35 mmHg).       [unfilled]  Immunization History   Administered Date(s) Administered   • COVID-19 (MODERNA) 1st, 2nd, 3rd Dose Only 09/25/2021, 10/23/2021   • Influenza, Unspecified 01/10/2018   • Pneumococcal, Unspecified 01/01/2013     The following portions of the patient's history were reviewed and updated as appropriate: allergies, current medications, past family history, past medical history, past social history, past surgical history and problem list.    PHQ-9 Total Score:           Physical Exam  Constitutional:       Appearance: Normal appearance.   HENT:      Head: Normocephalic and atraumatic.      Right Ear: External ear normal.      Left Ear: External ear normal.   Eyes:      General:         Right eye: No discharge.         Left eye: No discharge.      Conjunctiva/sclera: Conjunctivae normal.   Cardiovascular:      Rate and Rhythm: Normal rate and regular rhythm.      Pulses: Normal pulses.      Heart sounds: Normal heart sounds. No murmur heard.      Pulmonary:      Effort: Pulmonary effort is normal. No respiratory distress.      Breath sounds: Normal breath sounds.   Abdominal:      General: There is no distension.      Palpations: Abdomen is soft.      Tenderness: There is no abdominal tenderness.   Musculoskeletal:      Cervical back: Normal range of motion.      Right lower leg: No edema.      Left lower leg: No edema.   Lymphadenopathy:      Cervical: No cervical adenopathy.   Neurological:      Mental Status: She is alert. Mental status is at baseline.   Psychiatric:         Mood and Affect: Mood normal.         Behavior: Behavior normal.       Assessment/Plan    Diagnosis Plan   1. Stage 3a chronic kidney disease (HCC)     2. Flu vaccine need  Fluzone High-Dose 65+yrs (2530-4525)   3. Need for  diphtheria-tetanus-pertussis (Tdap) vaccine  Tdap Vaccine Greater Than or Equal To 6yo IM   4. Postmenopause  DEXA Bone Density Axial      Orders Placed This Encounter   Procedures   • DEXA Bone Density Axial     Standing Status:   Future     Standing Expiration Date:   12/20/2022     Order Specific Question:   Is patient taking or have taken long term Glucocorticoid (steroids)?     Answer:   No     Order Specific Question:   Does the patient have rheumatoid arthritis?     Answer:   No     Order Specific Question:   Does the patient have secondary osteoporosis?     Answer:   No     Order Specific Question:   Reason for Exam:     Answer:   screening for osteoporosis     Order Specific Question:   Release to patient     Answer:   Immediate   • Fluzone High-Dose 65+yrs (7188-8854)   • Tdap Vaccine Greater Than or Equal To 6yo IM     Multiple chronic medical conditions as discussed in HPI above, well controlled on current medications, vitals stable, no acute complaints today, reassuring.  Has upcoming follow-up with hematology for MGUS, asymptomatic, reassuring.  Kidney disease has improved, continue to avoid NSAIDs, and to continue with lisinopril, encouraged blood pressure control    Health maintenance; needs flu vaccine, tetanus vaccine, DEXA screening, will obtain.  Follow-up in 6 months, sooner if needed.        This document has been electronically signed by Isaac Love MD on December 20, 2021 09:17 CST    EMR Dragon/Transciption Disclaimer: Some of this note may be an electronic transcription/translation of spoken language to printed text.  The electronic translation of spoken language may permit erroneous, or at times, nonsensical words or phrases to be inadvertently transcribed. Although I have reviewed the note for such errors, some may still exist.

## 2021-12-26 DIAGNOSIS — E53.8 B12 DEFICIENCY: ICD-10-CM

## 2021-12-26 DIAGNOSIS — E78.5 HYPERLIPIDEMIA, UNSPECIFIED HYPERLIPIDEMIA TYPE: ICD-10-CM

## 2021-12-26 DIAGNOSIS — I48.91 ATRIAL FIBRILLATION, UNSPECIFIED TYPE (HCC): ICD-10-CM

## 2021-12-27 NOTE — TELEPHONE ENCOUNTER
Rx Refill Note  Requested Prescriptions     Pending Prescriptions Disp Refills   • Eliquis 5 MG tablet tablet [Pharmacy Med Name: Eliquis 5 MG Oral Tablet] 60 tablet 0     Sig: TAKE 1 TABLET BY MOUTH EVERY 12 HOURS   • SV Vitamin B-12 ER 1000 MCG tablet controlled-release [Pharmacy Med Name: SV Vitamin B-12 ER 1000 MCG Oral Tablet Extended Release]  0     Sig: Take 1 tablet by mouth once daily   • atorvastatin (LIPITOR) 20 MG tablet [Pharmacy Med Name: Atorvastatin Calcium 20 MG Oral Tablet] 90 tablet 0     Sig: Take 1 tablet by mouth once daily      Last office visit with prescribing clinician: 12/20/2021      Next office visit with prescribing clinician: 1/21/2022            [place] Rep  12/27/21, 09:50 CST

## 2021-12-28 RX ORDER — CYANOCOBALAMIN (VITAMIN B-12) 1000 MCG
TABLET, EXTENDED RELEASE ORAL
Qty: 90 EACH | Refills: 1 | Status: SHIPPED | OUTPATIENT
Start: 2021-12-28 | End: 2022-05-25

## 2021-12-28 RX ORDER — APIXABAN 5 MG/1
TABLET, FILM COATED ORAL
Qty: 180 TABLET | Refills: 1 | Status: SHIPPED | OUTPATIENT
Start: 2021-12-28 | End: 2022-05-25

## 2021-12-28 RX ORDER — ATORVASTATIN CALCIUM 20 MG/1
TABLET, FILM COATED ORAL
Qty: 90 TABLET | Refills: 3 | Status: SHIPPED | OUTPATIENT
Start: 2021-12-28 | End: 2022-05-25

## 2022-01-12 ENCOUNTER — CLINICAL SUPPORT (OUTPATIENT)
Dept: CARDIOLOGY | Facility: CLINIC | Age: 77
End: 2022-01-12

## 2022-01-12 DIAGNOSIS — I49.5 SICK SINUS SYNDROME: Primary | ICD-10-CM

## 2022-01-12 DIAGNOSIS — Z95.0 CARDIAC PACEMAKER IN SITU: ICD-10-CM

## 2022-01-12 NOTE — PROGRESS NOTES
Pacemaker Evaluation Report    2022    Primary Cardiologist: Dr. Mderano  Implanting MD: unknown  :Prematics Model: [place] [id number]  Serial Number: [id number]  Implant date: 2013    Reason for evaluation:routine  Cardiac device indication(s): sick sinus syndrome    Battery  LULU: <3 months     Interrogation Results  Atrial sensin.6 mV  Atrial capture: 0.7 V @ 0.4 ms   Atrial lead impedance: 494 ohms  Ventricular sensing: R wave: 19.1 mV @ 70 bpm  Ventricular capture: 0.5 V @ 0.4 ms   Ventricular lead impedance: right 499 ohms    Parameters  Mode: DDD  Base Rate: 60/130    Diagnostic Data  Atrial paced: 43 %   Ventricular paced: <1 %  Mode switch: <1%  AT/AF [place]: <1%  AHR: 0  VHR: 1 non-sustained    Intrinsic Rate: <30    Changes made: Changed RA output to 1.5 V.    Conclusions: normal device function. Follow up in 1 months for LULU check.    Assessment:  1. Sick sinus syndrome (HCC)    2. Cardiac pacemaker in situ    - eliquis                  This document has been electronically signed by DEN Hawkins on 2022 10:23 CST

## 2022-01-13 PROCEDURE — 93280 PM DEVICE PROGR EVAL DUAL: CPT | Performed by: NURSE PRACTITIONER

## 2022-02-02 ENCOUNTER — OFFICE VISIT (OUTPATIENT)
Dept: CARDIOLOGY | Facility: CLINIC | Age: 77
End: 2022-02-02

## 2022-02-02 VITALS
HEART RATE: 61 BPM | BODY MASS INDEX: 34.95 KG/M2 | HEIGHT: 60 IN | OXYGEN SATURATION: 94 % | TEMPERATURE: 97.1 F | SYSTOLIC BLOOD PRESSURE: 116 MMHG | WEIGHT: 178 LBS | DIASTOLIC BLOOD PRESSURE: 64 MMHG

## 2022-02-02 DIAGNOSIS — I50.9 CONGESTIVE HEART FAILURE, UNSPECIFIED HF CHRONICITY, UNSPECIFIED HEART FAILURE TYPE: Primary | ICD-10-CM

## 2022-02-02 DIAGNOSIS — I49.5 SICK SINUS SYNDROME: ICD-10-CM

## 2022-02-02 DIAGNOSIS — I34.0 NONRHEUMATIC MITRAL VALVE REGURGITATION: ICD-10-CM

## 2022-02-02 DIAGNOSIS — I48.0 PAROXYSMAL ATRIAL FIBRILLATION: ICD-10-CM

## 2022-02-02 PROCEDURE — 99213 OFFICE O/P EST LOW 20 MIN: CPT | Performed by: INTERNAL MEDICINE

## 2022-02-02 RX ORDER — MULTIVIT WITH MINERALS/LUTEIN
250 TABLET ORAL 2 TIMES DAILY
COMMUNITY

## 2022-02-02 NOTE — PROGRESS NOTES
Pennie Gibson  76 y.o. female    07/30/2021  1. Congestive heart failure, unspecified HF chronicity, unspecified heart failure type (HCC)    2. Nonrheumatic mitral valve regurgitation    3. Paroxysmal atrial fibrillation (HCC)    4. Sick sinus syndrome (HCC)        History of Present Illness:  Body mass index is 34.76 kg/m². BMI is above normal parameters. Recommendations include: exercise counseling, nutrition counseling and referral to primary care.        76 years old patient who presented to routine follow-up mention less than a minute of chest discomfort unable to describe the quality no radiations no further records have been about more than a week ago patient has history of nonobstructive CAD with previous multiple cardiac catheterization and previous stress test had some indeterminant outcome medical management was recommended.  Patient not interested for further risk stratification or changing any medication or even taking Imdur.  Recent echo preserved biventricular function relaxation abnormality and finding discussed with the patient her recent pacemaker evaluations reported battery voltage less than 3 months is scheduled to have [facility] previously evaluated by Vanda Rodriguez.  The patient appropriate for age good mental status good attitude no symptom system cardiac decompensation such orthopnea PND intermittent claudication bleeding disorder reported by the patient.  He moved to this area to live with her daughter, previously she lived in [place].  Complex past medical history of gout (chronic, managed with allopurinol), COPD (chronic), history of atrial fibrillation (OAC with Eliquis) , CAD, hypertension, hyperlipidemia,  gout, obesity, stage III kidney disease, CVA, sick sinus syndrome status post PPM in 2005 with atrial lead placement in 2015 , bifascicular block, aortic valve calcification, mild AR, moderate MR and TR. In reviewing her records it appears she underwent  myocardial perfusion imaging in light 2020 which showed apical perfusion defect, small with indeterminate criteria for ischemia.  Patient was recommended for continued medical management.  She has had 3 previous heart cath in ,  which showed 30% mild CAD noted to the RCA.  Last left heart catheterization in 2016, patient denies previous stent placement.  Last echocardiogram in 2020 showing aortic calcification with mild aortic regurgitation, mild mitral and tricuspid regurgitation, mild pulmonic regurgitation, borderline biatrial enlargement, normal LVEF at 65%. Patient has a [employer] permanent pacemaker last interrogated in 2020 showing normal device function with battery life of 1.5 years.      Echo 2021    · Estimated left ventricular EF = 61% Left ventricular ejection fraction appears to be 61 - 65%. Left ventricular systolic function is normal.  · Left ventricular diastolic function is consistent with (grade Ia w/high LAP) impaired relaxation.  · Left atrial volume is moderately increased.  · Moderate mitral valve regurgitation is present.  · Estimated right ventricular systolic pressure from tricuspid regurgitation is normal (<35 mmHg).    Implant date: 2013     Reason for evaluation:routine  Cardiac device indication(s): sick sinus syndrome     Battery  LULU: <3 months                Interrogation Results  Atrial sensin.6 mV  Atrial capture: 0.7 V @ 0.4 ms   Atrial lead impedance: 494 ohms  Ventricular sensing: R wave: 19.1 mV @ 70 bpm  Ventricular capture: 0.5 V @ 0.4 ms   Ventricular lead impedance: right 499 ohms     Parameters  Mode: DDD  Base Rate: 60/130     Diagnostic Data  Atrial paced: 43 %   Ventricular paced: <1 %  Mode switch: <1%  AT/AF [place]: <1%  AHR: 0  VHR: 1 non-sustained     Intrinsic Rate: <30     Changes made: Changed RA output to 1.5 V.     Conclusions: normal device function. Follow up in 1 months for LULU check.     Assessment:  1.  Sick sinus syndrome (HCC)    2. Cardiac pacemaker in situ    - eliquis         Lipid May 2021      Total Cholesterol   0 - 200 mg/dL 148    Triglycerides   0 - 150 mg/dL 136    HDL Cholesterol   40 - 60 mg/dL 33Low     LDL Cholesterol    0 - 100 mg/dL 91    VLDL Cholesterol   5 - 40 mg/dL 24    LDL/HDL Ratio  2.66          SUBJECTIVE:    Allergies   Allergen Reactions   • Penicillins Rash         Past Medical History:   Diagnosis Date   • Asthma    • CHF (congestive heart failure) ([facility])    • COPD (chronic obstructive pulmonary disease) ([facility])    • High blood pressure    • Pacemaker          Past Surgical History:   Procedure Laterality Date   • BLADDER SURGERY     • CARDIAC SURGERY     • COLON RESECTION     • PACEMAKER IMPLANTATION     • VAGINAL BIOPSY           Family History   Problem Relation Age of Onset   • Cancer Mother    • Diabetes Father    • Diabetes Sister    • Diabetes Brother          Social History     Socioeconomic History   • Marital status:    Tobacco Use   • Smoking status: Former Smoker     Packs/day: 2.00     Years: 45.00     Pack years: 90.00   • Smokeless tobacco: Never Used   Vaping Use   • Vaping Use: Never used   Substance and Sexual Activity   • Alcohol use: Not Currently     Comment: quit around her mid 50s   • Drug use: Never   • Sexual activity: Defer         Current Outpatient Medications   Medication Sig Dispense Refill   • albuterol sulfate  (90 Base) MCG/ACT inhaler Inhale 2 puffs Every 4 (Four) Hours As Needed for Wheezing. 18 g 6   • allopurinol (ZYLOPRIM) 100 MG tablet Take 100 mg by mouth Daily.     • aspirin (aspirin) 81 MG EC tablet Take 1 tablet by mouth Daily. 90 tablet 1   • atorvastatin (LIPITOR) 20 MG tablet Take 1 tablet by mouth once daily 90 tablet 3   • calcium carbonate (OS-MICHELL) 600 MG tablet Take 600 mg by mouth Daily.     • Cranberry 1000 MG capsule Take 4,200 mg by mouth Daily.     • Eliquis 5 MG tablet tablet TAKE 1 TABLET BY MOUTH EVERY 12 HOURS 180  "tablet 1   • Garlic 1000 MG capsule Take 1,000 mg by mouth Daily.     • lisinopril (PRINIVIL,ZESTRIL) 20 MG tablet Take 10 mg by mouth Daily.     • metoprolol tartrate (LOPRESSOR) 100 MG tablet Take 1 tablet by mouth 2 (Two) Times a Day. 180 tablet 1   • SV Vitamin B-12 ER 1000 MCG tablet controlled-release Take 1 tablet by mouth once daily 90 each 1   • vitamin C (ASCORBIC ACID) 250 MG tablet Take 250 mg by mouth Daily.       No current facility-administered medications for this visit.           Review of Systems:     Constitutional:  Denies recent weight loss, weight gain,no change in exercise tolerance.     HENT:  Denies any hearing loss, epistaxis    Eyes: No blurring    Respiratory: Severe COPD mild baseline shortness    Cardiovascular: See H&P    Gastrointestinal:  Denies change in bowel habits and dyspepsia    Endocrine: Negative for cold intolerance, heat intolerance, polydipsia    Genitourinary: Negative.      Musculoskeletal: History of osteoarthritis    Skin:  Deniesrashes, or skin lesions.     Allergic/Immunologic: Negative.  Negative for environmental allergies    Neurological:  Denies any history of recurrent headaches, strokes,     Hematological: Denies any food allergies, seasonal allergies    Psychiatric/Behavioral: Denies any history of depression        OBJECTIVE:    /64   Pulse 61   Temp 97.1 °F (36.2 °C)   Ht 152.4 cm (60\")   Wt 80.7 kg (178 lb)   SpO2 94%   BMI 34.76 kg/m²     Physical Exam:     Constitutional: Cooperative, alert and oriented, well-developed, well-nourished, in no acute distress.     HENT:   Head: Normocephalic, conjunctive is a pink, thyroid is nonpalpable no carotid bruit and trachea central.     Cardiovascular: Regular rhythm, S1 and S2 normal, no S3 or S4. Apical impulse not displaced. No murmurs    Pulmonary/Chest: Chest: No chest wall tenderness no rales and wheezing    Abdominal: Abdomen soft, bowel sounds normoactive, no masses,    Musculoskeletal: No " deformities, clubbing, cyanosis, erythema.   Neurological: No gross motor or sensory deficits noted    Skin: Warm and dry to the touch, no apparent skin lesions .     Psychiatric: He has a normal mood and affect. His behavior is normal        Procedures      Lab Results   Component Value Date    WBC 11.97 (H) 05/17/2021    HGB 14.3 05/17/2021    HCT 42.3 05/17/2021    MCV 83.1 05/17/2021     05/17/2021     Lab Results   Component Value Date    GLUCOSE 84 07/06/2021    BUN 17 07/06/2021    CREATININE 1.21 (H) 07/06/2021    EGFRIFNONA 43 (L) 07/06/2021    BCR 14.0 07/06/2021    CO2 24.6 07/06/2021    CALCIUM 9.4 07/06/2021    ALBUMIN 3.80 05/17/2021    AST 16 05/17/2021    ALT 10 05/17/2021     Lab Results   Component Value Date    CHOL 148 05/17/2021     Lab Results   Component Value Date    TRIG 136 05/17/2021     Lab Results   Component Value Date    HDL 33 (L) 05/17/2021     No components found for: LDLCALC  Lab Results   Component Value Date    LDL 91 05/17/2021     No results found for: HDLLDLRATIO  No components found for: CHOLHDL  No results found for: HGBA1C  No results found for: TSH, P1OPKHN, C9JGHDZ, THYROIDAB        ASSESSMENT AND PLAN:    #1 chronic congestive heart failure due to preserved left and systolic      76-year-old years old patient with history of hypertension with hypertensive heart disease, diastolic dysfunction relaxation abnormality abnormality in preserved left ventricle systolic function no previous echocardiogram.      Patient clinically well perfused, euvolemic and no evidence of congestive heart failure at the time of evaluations.  Her blood pressure is reasonably well controlled and she will continue metoprolol had history of chronic renal insufficiency.    #2 sick sinus syndrome s/p pacemaker  [employer] battery voltage good less than 3 months scheduled reeval[facility]      3 paroxysmal atrial fibrillation continue metoprolol     sinus rhythm  confirmed by EKG patient atrium and conducted the ventricle    [id number] score is 4 continue oral anticoagulation to decrease risk of cardiac embolization        #4 nonrheumatic mitral regurgitation    Clinically there is no evidence of progression at the time of evaluation echocardiogram study arranged to reassess to evaluate status of biventricular function    #5 history of nonobstructive CAD continue aspirin and atorvastatin  Patient had brief episode of chest pain lasting less than a minute or so unable to describe describe the quality with no radiation.  Refused further risk stratification and not able to want to take Imdur at this stage      Preventive    Obesity with a BMI of 34.7      Significantly low carbohydrate, low-fat, DASH diet graded exercise discussed with the patient    I spent 17 minutes caring for Pennie on this date of service. This time includes time spent by me includes counseling/coordination of care as relates to the presenting problem and any ordered procedures/tests as outlined above.       Electronically signed by Shiloh Medrano MD, 02/02/22, 9:24 AM CST.    Diagnoses and all orders for this visit:    1. Congestive heart failure, unspecified HF chronicity, unspecified heart failure type (HCC) (Primary)    2. Nonrheumatic mitral valve regurgitation    3. Paroxysmal atrial fibrillation (HCC)    4. Sick sinus syndrome (HCC)          Shiloh Medrano MD  2/2/2022  09:11 CST

## 2022-02-07 ENCOUNTER — OFFICE VISIT (OUTPATIENT)
Dept: FAMILY MEDICINE CLINIC | Facility: CLINIC | Age: 77
End: 2022-02-07

## 2022-02-07 VITALS
OXYGEN SATURATION: 97 % | DIASTOLIC BLOOD PRESSURE: 62 MMHG | HEIGHT: 60 IN | BODY MASS INDEX: 33.96 KG/M2 | SYSTOLIC BLOOD PRESSURE: 100 MMHG | HEART RATE: 60 BPM | WEIGHT: 173 LBS | TEMPERATURE: 97.5 F

## 2022-02-07 DIAGNOSIS — Z00.00 MEDICARE ANNUAL WELLNESS VISIT, SUBSEQUENT: Primary | ICD-10-CM

## 2022-02-07 DIAGNOSIS — Z23 NEED FOR PNEUMOCOCCAL VACCINE: ICD-10-CM

## 2022-02-07 PROCEDURE — 1170F FXNL STATUS ASSESSED: CPT | Performed by: STUDENT IN AN ORGANIZED HEALTH CARE EDUCATION/TRAINING PROGRAM

## 2022-02-07 PROCEDURE — 1159F MED LIST DOCD IN RCRD: CPT | Performed by: STUDENT IN AN ORGANIZED HEALTH CARE EDUCATION/TRAINING PROGRAM

## 2022-02-07 PROCEDURE — 96160 PT-FOCUSED HLTH RISK ASSMT: CPT | Performed by: STUDENT IN AN ORGANIZED HEALTH CARE EDUCATION/TRAINING PROGRAM

## 2022-02-07 PROCEDURE — G0439 PPPS, SUBSEQ VISIT: HCPCS | Performed by: STUDENT IN AN ORGANIZED HEALTH CARE EDUCATION/TRAINING PROGRAM

## 2022-02-07 PROCEDURE — 1126F AMNT PAIN NOTED NONE PRSNT: CPT | Performed by: STUDENT IN AN ORGANIZED HEALTH CARE EDUCATION/TRAINING PROGRAM

## 2022-02-07 NOTE — PATIENT INSTRUCTIONS
Medicare Wellness  Personal Prevention Plan of Service     Date of Office Visit:  2022  Encounter Provider:  Isaac Love MD  Place of Service:  [facility] PRIMARY [facility]ARE [facility]  Patient Name: Pennie Gibson  :  1945    As part of the Medicare Wellness portion of your visit today, we are providing you with this personalized preventive plan of services (PPPS). This plan is based upon recommendations of the [place] Preventive Services Task Force (USPSTF) and the Advisory Committee on Immunization Practices (ACIP).    This lists the preventive care services that should be considered, and provides dates of when you are due. Items listed as completed are up-to-date and do not require any further intervention.    Health Maintenance   Topic Date Due   • Pneumococcal Vaccine 65+ (1 of 2 - PPSV23) 1951   • ZOSTER VACCINE (1 of 2) 2022 (Originally 3/12/1993)   • COVID-19 Vaccine (3 - Booster for Moderna series) 2022   • LIPID PANEL  2022   • ANNUAL WELLNESS VISIT  2023   • DXA SCAN  2024   • TDAP/TD VACCINES (2 - Td or Tdap) 2031   • HEPATITIS C SCREENING  Completed   • INFLUENZA VACCINE  Completed   • HEMOGLOBIN A1C  Discontinued   • DIABETIC EYE EXAM  Discontinued   • URINE MICROALBUMIN  Discontinued       No orders of the defined types were placed in this encounter.      No follow-ups on file.

## 2022-02-08 NOTE — PROGRESS NOTES
The ABCs of the Annual Wellness Visit  Subsequent Medicare Wellness Visit    Chief Complaint   Patient presents with   • Medicare Wellness-subsequent      Subjective    History of Present Illness:  Pennie Gibson is a 76 y.o. female who presents for a Subsequent Medicare Wellness Visit.    The following portions of the patient's history were reviewed and   updated as appropriate: allergies, current medications, past family history, past medical history, past social history, past surgical history and problem list.    Compared to one year ago, the patient feels her physical   health is the same.    Compared to one year ago, the patient feels her mental   health is better.    Recent Hospitalizations:  She was admitted within the past 365 days at [facility].       Current Medical Providers:  Patient Care Team:  Isaac Love MD as PCP - General (General Practice)    Outpatient Medications Prior to Visit   Medication Sig Dispense Refill   • albuterol sulfate  (90 Base) MCG/ACT inhaler Inhale 2 puffs Every 4 (Four) Hours As Needed for Wheezing. 18 g 6   • allopurinol (ZYLOPRIM) 100 MG tablet Take 100 mg by mouth Daily.     • aspirin (aspirin) 81 MG EC tablet Take 1 tablet by mouth Daily. 90 tablet 1   • atorvastatin (LIPITOR) 20 MG tablet Take 1 tablet by mouth once daily 90 tablet 3   • calcium carbonate (OS-MICHELL) 600 MG tablet Take 600 mg by mouth Daily.     • Cranberry 1000 MG capsule Take 4,200 mg by mouth Daily.     • Eliquis 5 MG tablet tablet TAKE 1 TABLET BY MOUTH EVERY 12 HOURS 180 tablet 1   • Garlic 1000 MG capsule Take 1,000 mg by mouth Daily.     • lisinopril (PRINIVIL,ZESTRIL) 20 MG tablet Take 10 mg by mouth Daily.     • metoprolol tartrate (LOPRESSOR) 100 MG tablet Take 1 tablet by mouth 2 (Two) Times a Day. 180 tablet 1   • SV Vitamin B-12 ER 1000 MCG tablet controlled-release Take 1 tablet by mouth once daily 90 each 1   • vitamin C (ASCORBIC ACID) 250 MG tablet Take 250 mg by mouth  "Daily.       No facility-administered medications prior to visit.       No opioid medication identified on active medication list. I have reviewed chart for other potential  high risk medication/s and harmful drug interactions in the elderly.          Aspirin is on active medication list. Aspirin use is not indicated based on review of current medical condition/s. Risk of harm outweighs potential benefits. Patient instructed to discontinue this medication.  .    Patient Active Problem List   Diagnosis   • Seasonal allergies   • Acute UTI   • Congestive heart failure (HCC)   • Nonrheumatic mitral valve regurgitation   • Nonrheumatic tricuspid valve regurgitation   • Sick sinus syndrome (HCC)   • Cardiac pacemaker in situ   • Atrial fibrillation (HCC)   • Gout   • Chronic kidney disease     Advance Care Planning  Advance Directive is not on file.  ACP discussion was declined by the patient. Patient has an advance directive (not in EMR), copy requested. Patient does not have an advance directive, information provided.          Objective    Vitals:    02/07/22 1719   BP: 100/62   BP Location: Right arm   Patient Position: Sitting   Cuff Size: Large Adult   Pulse: 60   Temp: 97.5 °F (36.4 °C)   SpO2: 97%   Weight: 78.5 kg (173 lb)   Height: 152.4 cm (60\")   PainSc: 0-No pain     BMI Readings from Last 1 Encounters:   02/07/22 33.79 kg/m²   BMI is above normal parameters. Recommendations include: educational material, exercise counseling and nutrition counseling    Does the patient have evidence of cognitive impairment? Yes    Physical Exam  Constitutional:       Appearance: Normal appearance.   HENT:      Head: Normocephalic and atraumatic.      Right Ear: External ear normal.      Left Ear: External ear normal.   Eyes:      General:         Right eye: No discharge.         Left eye: No discharge.      Conjunctiva/sclera: Conjunctivae normal.   Cardiovascular:      Rate and Rhythm: Normal rate and regular rhythm.      " Pulses: Normal pulses.      Heart sounds: Normal heart sounds. No murmur heard.      Pulmonary:      Effort: Pulmonary effort is normal. No respiratory distress.      Breath sounds: Normal breath sounds.   Abdominal:      General: There is no distension.      Palpations: Abdomen is soft.      Tenderness: There is no abdominal tenderness.   Musculoskeletal:      Cervical back: Normal range of motion.      Right lower leg: No edema.      Left lower leg: No edema.   Lymphadenopathy:      Cervical: No cervical adenopathy.   Neurological:      Mental Status: She is alert. Mental status is at baseline.   Psychiatric:         Mood and Affect: Mood normal.         Behavior: Behavior normal.                 HEALTH RISK ASSESSMENT    Smoking Status:  Social History     Tobacco Use   Smoking Status Former Smoker   • Packs/day: 2.00   • Years: 45.00   • Pack years: 90.00   Smokeless Tobacco Never Used     Alcohol Consumption:  Social History     Substance and Sexual Activity   Alcohol Use Not Currently    Comment: quit around her mid 50s     Fall Risk Screen:    SHIRLEY Fall Risk Assessment was completed, and patient is at LOW risk for falls.Assessment completed on:2/7/2022    Depression Screening:  PHQ-2/PHQ-9 Depression Screening 2/7/2022   Little interest or pleasure in doing things 0   Feeling down, depressed, or hopeless 0   Total Score 0       Health Habits and Functional and Cognitive Screening:  Functional & Cognitive Status 2/7/2022   Do you have difficulty bathing yourself, getting dressed or grooming yourself? No   Do you have difficulty using the toilet? No   Do you have difficulty moving around from place to place? No   Do you have trouble with steps or getting out of a bed or a chair? No   Current Diet Well Balanced Diet   Dental Exam Up to date   Eye Exam Not up to date   Exercise (times per week) 0 times per week   Current Exercises Include No Regular Exercise   Do you need help using the phone?  No   Are you  deaf or do you have serious difficulty hearing?  Yes   Do you need help with transportation? Yes   Do you need help shopping? No   Do you need help preparing meals?  No   Do you need help with housework?  No   Do you need help with laundry? No   Do you need help taking your medications? Yes   Do you need help managing money? No   Do you ever drive or ride in a car without wearing a seat belt? No   Have you felt unusual stress, anger or loneliness in the last month? No   Who do you live with? Child   If you need help, do you have trouble finding someone available to you? No   Have you been bothered in the last four weeks by sexual problems? No   Do you have difficulty concentrating, remembering or making decisions? Yes       Age-appropriate Screening Schedule:  Refer to the list below for future screening recommendations based on patient's age, sex and/or medical conditions. Orders for these recommended tests are listed in the plan section. The patient has been provided with a written plan.    Health Maintenance   Topic Date Due   • ZOSTER VACCINE (1 of 2) 02/07/2022 (Originally 3/12/1993)   • LIPID PANEL  05/17/2022   • DXA SCAN  01/12/2024   • TDAP/TD VACCINES (2 - Td or Tdap) 12/20/2031   • INFLUENZA VACCINE  Completed   • HEMOGLOBIN A1C  Discontinued   • DIABETIC EYE EXAM  Discontinued   • URINE MICROALBUMIN  Discontinued              Assessment/Plan   CMS Preventative Services Quick Reference  Risk Factors Identified During Encounter  Dementia/Memory   Obesity/Overweight   Polypharmacy  The above risks/problems have been discussed with the patient.  Follow up actions/plans if indicated are seen below in the Assessment/Plan Section.  Pertinent information has been shared with the patient in the After Visit Summary.    Diagnoses and all orders for this visit:    1. Need for pneumococcal vaccine (Primary)  -     pneumococcal polysaccharide 23-valent (PNEUMOVAX-23) vaccine 0.5 mL    2. Medicare annual wellness  visit, subsequent        Follow Up:   No follow-ups on file.     An After Visit Summary and PPPS were made available to the patient.

## 2022-02-09 ENCOUNTER — CLINICAL SUPPORT (OUTPATIENT)
Dept: CARDIOLOGY | Facility: CLINIC | Age: 77
End: 2022-02-09

## 2022-02-09 DIAGNOSIS — I49.5 SICK SINUS SYNDROME: Primary | ICD-10-CM

## 2022-02-09 DIAGNOSIS — Z95.0 CARDIAC PACEMAKER IN SITU: ICD-10-CM

## 2022-02-09 PROCEDURE — 93288 INTERROG EVL PM/LDLS PM IP: CPT | Performed by: NURSE PRACTITIONER

## 2022-02-09 NOTE — PROGRESS NOTES
Pacemaker Evaluation Report    2022    Primary Cardiologist: Dr. Medrano  Implanting MD: unknown  :[employer] Model: [place] K173  Serial Number: [id number]  Implant date: 2013    Reason for evaluation:routine  Cardiac device indication(s): sick sinus syndrome    Battery  LULU: <3 months     Interrogation Results  Atrial sensin.4 mV  Atrial capture: 0.8 V @ 0.4 ms   Atrial lead impedance: 485 ohms  Ventricular sensing: R wave: 17.8 mV @ 49 bpm  Ventricular capture: 0.5 V @ 0.4 ms   Ventricular lead impedance: right 476 ohms    Parameters  Mode: DDD  Base Rate: 60/130    Diagnostic Data  Atrial paced: 62 %   Ventricular paced: <1 %  Mode switch: <1%  AT/AF [place]: <1%  AHR: 0  VHR: 3 non-sustained    Intrinsic Rate: <30    Changes made: none    Conclusions: normal device function. Follow up in 1 months for LULU check.    Assessment:  1. Sick sinus syndrome (HCC)    2. Cardiac pacemaker in situ    - eliquis                This document has been electronically signed by DEN David on 2022 15:37 CST

## 2022-02-21 ENCOUNTER — PATIENT MESSAGE (OUTPATIENT)
Dept: CARDIOLOGY | Facility: CLINIC | Age: 77
End: 2022-02-21

## 2022-02-24 DIAGNOSIS — Z95.0 CARDIAC PACEMAKER IN SITU: Primary | ICD-10-CM

## 2022-02-24 DIAGNOSIS — I50.9 CONGESTIVE HEART FAILURE, UNSPECIFIED HF CHRONICITY, UNSPECIFIED HEART FAILURE TYPE: ICD-10-CM

## 2022-03-09 ENCOUNTER — CLINICAL SUPPORT (OUTPATIENT)
Dept: CARDIOLOGY | Facility: CLINIC | Age: 77
End: 2022-03-09

## 2022-03-09 ENCOUNTER — HOSPITAL ENCOUNTER (OUTPATIENT)
Dept: GENERAL RADIOLOGY | Facility: HOSPITAL | Age: 77
Discharge: HOME | End: 2022-03-09
Admitting: INTERNAL MEDICINE

## 2022-03-09 DIAGNOSIS — I50.9 CONGESTIVE HEART FAILURE, UNSPECIFIED HF CHRONICITY, UNSPECIFIED HEART FAILURE TYPE: ICD-10-CM

## 2022-03-09 DIAGNOSIS — Z95.0 CARDIAC PACEMAKER IN SITU: ICD-10-CM

## 2022-03-09 DIAGNOSIS — I49.5 SSS (SICK SINUS SYNDROME): Primary | ICD-10-CM

## 2022-03-09 PROCEDURE — 71046 X-RAY EXAM CHEST 2 VIEWS: CPT

## 2022-03-09 NOTE — PROGRESS NOTES
Pacemaker Evaluation Report    March 15, 2022    Primary Cardiologist: Dr. Medrano  Implanting MD: unknown  :[employer] Model: [place] [id number]  Serial Number: [id number]  Implant date: 2013    Reason for evaluation:routine  Cardiac device indication(s): sick sinus syndrome    Battery  LULU: <3 months     Interrogation Results  Atrial sensin mV  Atrial capture: 0.8 V @ 0.4 ms   Atrial lead impedance: 486 ohms  Ventricular sensing: R wave: 19.3 mV @ 39 bpm  Ventricular capture: 0.5 V @ 0.4 ms   Ventricular lead impedance: right 486 ohms    Parameters  Mode: DDD  Base Rate: 60/130    Diagnostic Data  Atrial paced: 68 %   Ventricular paced: <1 %  Mode switch: <1%  AT/AF [place]: <1%  AHR: 0  VHR: 2, atrially driven    Intrinsic Rate: 30    Changes made: Changed RA output to 1.6 V    Conclusions: normal device function. Follow up in 1 months for LULU check.    Assessment:  1. SSS (sick sinus syndrome) (HCC)    2. Cardiac pacemaker in situ    - eliquis            This document has been electronically signed by DEN Hawkins on March 15, 2022 16:55 CDT

## 2022-03-14 ENCOUNTER — TELEPHONE (OUTPATIENT)
Dept: CARDIOLOGY | Facility: CLINIC | Age: 77
End: 2022-03-14

## 2022-03-14 NOTE — TELEPHONE ENCOUNTER
Contacted patient daughter to inform her that her mom's chest XR showed that one of the leads was abandoned but the other two are still in place. To remove just one of the leads is a very risky procedure so at this time he feels as if it is okay to leave in for now. Pt daughter was unavailable so left generic VM for return call to office.

## 2022-03-15 DIAGNOSIS — I50.21 ACUTE SYSTOLIC CHF (CONGESTIVE HEART FAILURE): ICD-10-CM

## 2022-03-15 PROCEDURE — 93280 PM DEVICE PROGR EVAL DUAL: CPT | Performed by: NURSE PRACTITIONER

## 2022-03-15 RX ORDER — METOPROLOL TARTRATE 100 MG/1
TABLET ORAL
Qty: 180 TABLET | Refills: 0 | Status: SHIPPED | OUTPATIENT
Start: 2022-03-15 | End: 2022-05-25

## 2022-04-13 ENCOUNTER — CLINICAL SUPPORT (OUTPATIENT)
Dept: CARDIOLOGY | Facility: CLINIC | Age: 77
End: 2022-04-13

## 2022-04-13 NOTE — PROGRESS NOTES
Pacemaker Evaluation Report    April 13, 2022    77 years old patient with history of paroxysmal atrial fibrillation sick sinus syndrome hypertension with hypertensive heart disease s/p pacemaker and presented the pacer clinic.  The pacemaker was interrogated  Primary Cardiologist: Dr. Medrano  Implanting MD: unknown  :[employer] Model: [place] [id number]  Serial Number: [id number]  Implant date: 6/12/2013    Reason for evaluation:routine  Cardiac device indication(s): sick sinus syndrome    Battery  LULU: <3 months     Interrogation Results  Atrial sensing: paced mV  Atrial capture: 0.8 V @ 0.4 ms   Atrial lead impedance: 487 ohms  Ventricular sensing: R wave: 17.1 mV @ 32 bpm  Ventricular capture: 0.5 V @ 0.4 ms   Ventricular lead impedance: right 482 ohms    Parameters  Mode: DDD  Base Rate: 60/130    Diagnostic Data  Atrial paced: 68 %   Ventricular paced: <1 %  Mode switch: <1%  AT/AF [place]: <1%  AHR: 0  VHR: 2, atrially driven    Intrinsic Rate: 30    Changes made: None    Conclusions: normal device function. Follow up in 1 months for LULU check.    Assessment:    Sick sinus syndrome    2 paroxysmal atrial fibrillation  - eliquis    Normal functioning pacemaker battery voltage less than 3-month continue follow-up with the pacer clinic as per schedule appointment            This document has been electronically signed by Shiloh Medrano MD on April 14, 2022 10:21 CDT        This document has been electronically signed by Marissa Hyde RN on April 13, 2022 10:04 CDT

## 2022-05-13 ENCOUNTER — CLINICAL SUPPORT (OUTPATIENT)
Dept: CARDIOLOGY | Facility: CLINIC | Age: 77
End: 2022-05-13

## 2022-05-13 ENCOUNTER — OFFICE VISIT (OUTPATIENT)
Dept: PULMONOLOGY | Facility: CLINIC | Age: 77
End: 2022-05-13

## 2022-05-13 ENCOUNTER — PROCEDURE VISIT (OUTPATIENT)
Dept: PULMONOLOGY | Facility: CLINIC | Age: 77
End: 2022-05-13

## 2022-05-13 ENCOUNTER — PREP FOR SURGERY (OUTPATIENT)
Dept: OTHER | Facility: HOSPITAL | Age: 77
End: 2022-05-13

## 2022-05-13 VITALS
SYSTOLIC BLOOD PRESSURE: 108 MMHG | HEART RATE: 99 BPM | DIASTOLIC BLOOD PRESSURE: 60 MMHG | WEIGHT: 180 LBS | HEIGHT: 60 IN | BODY MASS INDEX: 35.34 KG/M2 | OXYGEN SATURATION: 93 %

## 2022-05-13 DIAGNOSIS — T82.111D MALFUNCTION OF CARDIAC PACEMAKER, SUBSEQUENT ENCOUNTER: Primary | ICD-10-CM

## 2022-05-13 DIAGNOSIS — J45.40 MODERATE PERSISTENT ASTHMA WITHOUT COMPLICATION: ICD-10-CM

## 2022-05-13 DIAGNOSIS — R06.02 SHORTNESS OF BREATH: Primary | ICD-10-CM

## 2022-05-13 DIAGNOSIS — R06.02 SHORTNESS OF BREATH: ICD-10-CM

## 2022-05-13 PROCEDURE — 94727 GAS DIL/WSHOT DETER LNG VOL: CPT | Performed by: INTERNAL MEDICINE

## 2022-05-13 PROCEDURE — 94729 DIFFUSING CAPACITY: CPT | Performed by: INTERNAL MEDICINE

## 2022-05-13 PROCEDURE — 94060 EVALUATION OF WHEEZING: CPT | Performed by: INTERNAL MEDICINE

## 2022-05-13 PROCEDURE — 99204 OFFICE O/P NEW MOD 45 MIN: CPT | Performed by: INTERNAL MEDICINE

## 2022-05-13 RX ORDER — BUDESONIDE AND FORMOTEROL FUMARATE DIHYDRATE 80; 4.5 UG/1; UG/1
2 AEROSOL RESPIRATORY (INHALATION)
Qty: 1 EACH | Refills: 11 | Status: SHIPPED | OUTPATIENT
Start: 2022-05-13 | End: 2022-10-07 | Stop reason: CLARIF

## 2022-05-13 RX ORDER — SODIUM CHLORIDE 0.9 % (FLUSH) 0.9 %
10 SYRINGE (ML) INJECTION AS NEEDED
Status: CANCELLED | OUTPATIENT
Start: 2022-05-26

## 2022-05-13 RX ORDER — SODIUM CHLORIDE 0.9 % (FLUSH) 0.9 %
3 SYRINGE (ML) INJECTION EVERY 12 HOURS SCHEDULED
Status: CANCELLED | OUTPATIENT
Start: 2022-05-26

## 2022-05-13 RX ORDER — CLINDAMYCIN PHOSPHATE 900 MG/50ML
900 INJECTION INTRAVENOUS
Status: CANCELLED | OUTPATIENT
Start: 2022-05-26

## 2022-05-13 NOTE — PROGRESS NOTES
FULL PFT WITH BRONCHODILATOR PERFORMED.     6 SECOND EXHALATION ON SPIROMETRY    GOOD PATIENT EFFORT AND COOPERATION.     ORDERED BY DR. ODOM, READ BY DR. ODOM

## 2022-05-13 NOTE — PROGRESS NOTES
Pacemaker Evaluation Report    May 13, 2022    77 years old patient with history of paroxysmal atrial fibrillation sick sinus syndrome hypertension with hypertensive heart disease s/p pacemaker and presented the pacer clinic.  The pacemaker was interrogated  Primary Cardiologist: Dr. Medrano  Implanting MD: unknown  :[employer] Model: [place] [id number]  Serial Number: [id number]  Implant date: 2013    Reason for evaluation:routine  Cardiac device indication(s): sick sinus syndrome    Battery  LULU: 22     Interrogation Results  Atrial sensin.5 mV  Atrial capture: 0.6 V @ 0.4 ms   Atrial lead impedance: 495 ohms  Ventricular sensing: R wave: 18.8 mV @ 49 bpm  Ventricular capture: 0.6 V @ 0.4 ms   Ventricular lead impedance: right 507 ohms    Parameters  Mode: DDD  Base Rate: 60/130    Diagnostic Data  Atrial paced: 6 %   Ventricular paced: <1 %  Mode switch: <1%  AT/AF [place]: <1%  AHR: 0  VHR: 3, NS    Intrinsic Rate: 49    Changes made: None    Conclusions: normal device function. Will schedule generator change.    Assessment:    Sick sinus syndrome    2 paroxysmal atrial fibrillation  - eliquis    Normal functioning pacemaker battery voltage less than 3-month continue follow-up with the pacer clinic as per schedule appointment            This document has been electronically signed by Marissa Hyde RN on May 13, 2022 10:23 CDT        This document has been electronically signed by Marissa Hyde RN on May 13, 2022 10:23 CDT

## 2022-05-16 PROBLEM — T82.111A COMPLICATIONS, MECHANICAL, PACEMAKER, CARDIAC: Status: ACTIVE | Noted: 2022-05-16

## 2022-05-25 RX ORDER — METOPROLOL TARTRATE 100 MG/1
100 TABLET ORAL 2 TIMES DAILY
COMMUNITY
End: 2022-07-11

## 2022-05-25 RX ORDER — ATORVASTATIN CALCIUM 20 MG/1
1 TABLET, FILM COATED ORAL DAILY
COMMUNITY
End: 2023-02-14

## 2022-05-26 ENCOUNTER — HOSPITAL ENCOUNTER (OUTPATIENT)
Facility: HOSPITAL | Age: 77
Setting detail: HOSPITAL OUTPATIENT SURGERY
Discharge: HOME | End: 2022-05-26
Attending: INTERNAL MEDICINE | Admitting: INTERNAL MEDICINE

## 2022-05-26 ENCOUNTER — ANESTHESIA (OUTPATIENT)
Dept: CARDIOLOGY | Facility: HOSPITAL | Age: 77
End: 2022-05-26

## 2022-05-26 ENCOUNTER — ANESTHESIA EVENT (OUTPATIENT)
Dept: CARDIOLOGY | Facility: HOSPITAL | Age: 77
End: 2022-05-26

## 2022-05-26 VITALS
DIASTOLIC BLOOD PRESSURE: 66 MMHG | OXYGEN SATURATION: 95 % | TEMPERATURE: 96.7 F | RESPIRATION RATE: 18 BRPM | HEIGHT: 60 IN | SYSTOLIC BLOOD PRESSURE: 134 MMHG | HEART RATE: 59 BPM | BODY MASS INDEX: 35.02 KG/M2 | WEIGHT: 178.35 LBS

## 2022-05-26 DIAGNOSIS — T82.111D MALFUNCTION OF CARDIAC PACEMAKER, SUBSEQUENT ENCOUNTER: ICD-10-CM

## 2022-05-26 LAB
ANION GAP SERPL CALCULATED.3IONS-SCNC: 10 MMOL/L (ref 5–15)
BUN SERPL-MCNC: 19 MG/DL (ref 8–23)
BUN/CREAT SERPL: 12.9 (ref 7–25)
CALCIUM SPEC-SCNC: 10.1 MG/DL (ref 8.6–10.5)
CHLORIDE SERPL-SCNC: 110 MMOL/L (ref 98–107)
CO2 SERPL-SCNC: 23 MMOL/L (ref 22–29)
CREAT SERPL-MCNC: 1.47 MG/DL (ref 0.57–1)
DEPRECATED RDW RBC AUTO: 46.2 FL (ref 37–54)
EGFRCR SERPLBLD CKD-EPI 2021: 36.2 ML/MIN/1.73
ERYTHROCYTE [DISTWIDTH] IN BLOOD BY AUTOMATED COUNT: 14.6 % (ref 12.3–15.4)
GLUCOSE SERPL-MCNC: 93 MG/DL (ref 65–99)
HCT VFR BLD AUTO: 39.7 % (ref 34–46.6)
HGB BLD-MCNC: 13.6 G/DL (ref 12–15.9)
INR PPP: 1.07 (ref 0.8–1.2)
MCH RBC QN AUTO: 30.4 PG (ref 26.6–33)
MCHC RBC AUTO-ENTMCNC: 34.3 G/DL (ref 31.5–35.7)
MCV RBC AUTO: 88.6 FL (ref 79–97)
PLATELET # BLD AUTO: 239 10*3/MM3 (ref 140–450)
PMV BLD AUTO: 9 FL (ref 6–12)
POTASSIUM SERPL-SCNC: 4.7 MMOL/L (ref 3.5–5.2)
PROTHROMBIN TIME: 13.7 SECONDS (ref 11.1–15.3)
RBC # BLD AUTO: 4.48 10*6/MM3 (ref 3.77–5.28)
SODIUM SERPL-SCNC: 143 MMOL/L (ref 136–145)
WBC NRBC COR # BLD: 10.72 10*3/MM3 (ref 3.4–10.8)

## 2022-05-26 PROCEDURE — 33228 REMV&REPLC PM GEN DUAL LEAD: CPT | Performed by: INTERNAL MEDICINE

## 2022-05-26 PROCEDURE — 25010000002 FENTANYL CITRATE (PF) 50 MCG/ML SOLUTION: Performed by: INTERNAL MEDICINE

## 2022-05-26 PROCEDURE — C1785 PMKR, DUAL, RATE-RESP: HCPCS | Performed by: INTERNAL MEDICINE

## 2022-05-26 PROCEDURE — 0 LIDOCAINE 1 % SOLUTION: Performed by: INTERNAL MEDICINE

## 2022-05-26 PROCEDURE — S0260 H&P FOR SURGERY: HCPCS | Performed by: INTERNAL MEDICINE

## 2022-05-26 PROCEDURE — 25010000002 MIDAZOLAM PER 1 MG: Performed by: INTERNAL MEDICINE

## 2022-05-26 PROCEDURE — 93010 ELECTROCARDIOGRAM REPORT: CPT | Performed by: INTERNAL MEDICINE

## 2022-05-26 PROCEDURE — 25010000002 GENTAMICIN PER 80 MG: Performed by: INTERNAL MEDICINE

## 2022-05-26 PROCEDURE — 93005 ELECTROCARDIOGRAM TRACING: CPT | Performed by: ANESTHESIOLOGY

## 2022-05-26 PROCEDURE — 80048 BASIC METABOLIC PNL TOTAL CA: CPT | Performed by: INTERNAL MEDICINE

## 2022-05-26 PROCEDURE — 85610 PROTHROMBIN TIME: CPT | Performed by: INTERNAL MEDICINE

## 2022-05-26 PROCEDURE — 85027 COMPLETE CBC AUTOMATED: CPT | Performed by: INTERNAL MEDICINE

## 2022-05-26 DEVICE — PACEMAKER
Type: IMPLANTABLE DEVICE | Status: FUNCTIONAL
Brand: ACCOLADE™ MRI DR

## 2022-05-26 RX ORDER — MIDAZOLAM HYDROCHLORIDE 1 MG/ML
INJECTION INTRAMUSCULAR; INTRAVENOUS AS NEEDED
Status: DISCONTINUED | OUTPATIENT
Start: 2022-05-26 | End: 2022-05-26 | Stop reason: HOSPADM

## 2022-05-26 RX ORDER — FENTANYL CITRATE 50 UG/ML
INJECTION, SOLUTION INTRAMUSCULAR; INTRAVENOUS AS NEEDED
Status: DISCONTINUED | OUTPATIENT
Start: 2022-05-26 | End: 2022-05-26 | Stop reason: HOSPADM

## 2022-05-26 RX ORDER — ONDANSETRON 2 MG/ML
4 INJECTION INTRAMUSCULAR; INTRAVENOUS ONCE AS NEEDED
Status: DISCONTINUED | OUTPATIENT
Start: 2022-05-26 | End: 2022-05-26 | Stop reason: HOSPADM

## 2022-05-26 RX ORDER — SODIUM CHLORIDE 0.9 % (FLUSH) 0.9 %
3 SYRINGE (ML) INJECTION EVERY 12 HOURS SCHEDULED
Status: DISCONTINUED | OUTPATIENT
Start: 2022-05-26 | End: 2022-05-26 | Stop reason: HOSPADM

## 2022-05-26 RX ORDER — SODIUM CHLORIDE 9 MG/ML
1000 INJECTION, SOLUTION INTRAVENOUS CONTINUOUS
Status: DISCONTINUED | OUTPATIENT
Start: 2022-05-26 | End: 2022-05-26 | Stop reason: HOSPADM

## 2022-05-26 RX ORDER — CLINDAMYCIN PHOSPHATE 300 MG/50ML
300 INJECTION, SOLUTION INTRAVENOUS ONCE
Status: DISCONTINUED | OUTPATIENT
Start: 2022-05-26 | End: 2022-05-26 | Stop reason: HOSPADM

## 2022-05-26 RX ORDER — LIDOCAINE HYDROCHLORIDE 10 MG/ML
INJECTION, SOLUTION INFILTRATION; PERINEURAL AS NEEDED
Status: DISCONTINUED | OUTPATIENT
Start: 2022-05-26 | End: 2022-05-26 | Stop reason: HOSPADM

## 2022-05-26 RX ORDER — CLINDAMYCIN PHOSPHATE 900 MG/50ML
900 INJECTION INTRAVENOUS
Status: COMPLETED | OUTPATIENT
Start: 2022-05-26 | End: 2022-05-26

## 2022-05-26 RX ORDER — SODIUM CHLORIDE 0.9 % (FLUSH) 0.9 %
10 SYRINGE (ML) INJECTION AS NEEDED
Status: DISCONTINUED | OUTPATIENT
Start: 2022-05-26 | End: 2022-05-26 | Stop reason: HOSPADM

## 2022-05-26 RX ADMIN — GENTAMICIN SULFATE 80 MG: 40 INJECTION, SOLUTION INTRAMUSCULAR; INTRAVENOUS at 11:00

## 2022-05-26 RX ADMIN — CLINDAMYCIN PHOSPHATE 900 MG: 900 INJECTION, SOLUTION INTRAVENOUS at 10:10

## 2022-05-26 RX ADMIN — SODIUM CHLORIDE 1000 ML: 9 INJECTION, SOLUTION INTRAVENOUS at 08:47

## 2022-05-27 ENCOUNTER — TELEPHONE (OUTPATIENT)
Dept: CARDIOLOGY | Facility: CLINIC | Age: 77
End: 2022-05-27

## 2022-05-27 NOTE — TELEPHONE ENCOUNTER
Left detailed message for her to remove pressure dressing from generator site, but to leave the steri strips in place. Steri strip are to remain dry and told her to call back with any questions.

## 2022-05-28 LAB
QT INTERVAL: 448 MS
QTC INTERVAL: 448 MS

## 2022-07-08 ENCOUNTER — OFFICE VISIT (OUTPATIENT)
Dept: CARDIOLOGY | Facility: CLINIC | Age: 77
End: 2022-07-08

## 2022-07-08 VITALS
BODY MASS INDEX: 36.24 KG/M2 | DIASTOLIC BLOOD PRESSURE: 84 MMHG | OXYGEN SATURATION: 96 % | WEIGHT: 184.6 LBS | SYSTOLIC BLOOD PRESSURE: 124 MMHG | HEART RATE: 84 BPM | HEIGHT: 60 IN

## 2022-07-08 DIAGNOSIS — I48.0 PAROXYSMAL ATRIAL FIBRILLATION: ICD-10-CM

## 2022-07-08 DIAGNOSIS — I50.32 CHRONIC DIASTOLIC CONGESTIVE HEART FAILURE: ICD-10-CM

## 2022-07-08 DIAGNOSIS — I49.5 SICK SINUS SYNDROME: Primary | ICD-10-CM

## 2022-07-08 DIAGNOSIS — I34.0 NONRHEUMATIC MITRAL VALVE REGURGITATION: ICD-10-CM

## 2022-07-08 PROCEDURE — 99024 POSTOP FOLLOW-UP VISIT: CPT | Performed by: INTERNAL MEDICINE

## 2022-07-08 NOTE — PROGRESS NOTES
Pennie Gibson  77 y.o. female    07/30/2021  1. Sick sinus syndrome (HCC)    2. Nonrheumatic mitral valve regurgitation    3. Chronic diastolic congestive heart failure (HCC)    4. Paroxysmal atrial fibrillation (HCC)        History of Present Illness:  Body mass index is 36.05 kg/m². BMI is above normal parameters. Recommendations include: exercise counseling, nutrition counseling and referral to primary care.      77 years old patient with history of nonobstructive CAD presented today, 7/8/2022 for post hospital follow-up after pacemaker generator replacement in May 26, 2022.  She is a pleased with her clinical outcome.  No symptoms of cardiac decompensation reported.  No chest pain orthopnea PND.  He moved to this area to live with her daughter, previously she lived in [place].  Complex past medical history of gout (chronic, managed with allopurinol), COPD (chronic), history of atrial fibrillation (OAC with Eliquis) , CAD, hypertension, hyperlipidemia,  gout, obesity, stage III kidney disease, CVA, sick sinus syndrome status post PPM in 2005 with atrial lead placement in 2015 , bifascicular block, aortic valve calcification, mild AR, moderate MR and TR. In reviewing her records it appears she underwent myocardial perfusion imaging in light 2020 which showed apical perfusion defect, small with indeterminate criteria for ischemia.  Patient was recommended for continued medical management.  She has had 3 previous heart cath in 2008, 2010 which showed 30% mild CAD noted to the RCA.  Last left heart catheterization in April 2016, patient denies previous stent placement.  Last echocardiogram in July 2020 showing aortic calcification with mild aortic regurgitation, mild mitral and tricuspid regurgitation, mild pulmonic regurgitation, borderline biatrial enlargement, normal LVEF at 65%. Patient has a [place] [employer] permanent pacemaker last interrogated in November 2020 showing normal device function with battery  life of 1.5 years.    Date of Procedure: 22     Referring Physician:      /ELECTROPHYSIOLOGIST:            PROCEDURE(S) PERFORMED:    1. Removal of a dual-chamber permanent pacemaker generator.  2. Insertion of a dual-chamber permanent pacemaker generator.     INDICATIONS FOR PROCDEDURE:            Pacemaker reached LULU status    Echo 2021    · Estimated left ventricular EF = 61% Left ventricular ejection fraction appears to be 61 - 65%. Left ventricular systolic function is normal.  · Left ventricular diastolic function is consistent with (grade Ia w/high LAP) impaired relaxation.  · Left atrial volume is moderately increased.  · Moderate mitral valve regurgitation is present.  · Estimated right ventricular systolic pressure from tricuspid regurgitation is normal (<35 mmHg).    Implant date: 2013     Reason for evaluation:routine  Cardiac device indication(s): sick sinus syndrome     Battery  LULU: <3 months                Interrogation Results  Atrial sensin.6 mV  Atrial capture: 0.7 V @ 0.4 ms   Atrial lead impedance: 494 ohms  Ventricular sensing: R wave: 19.1 mV @ 70 bpm  Ventricular capture: 0.5 V @ 0.4 ms   Ventricular lead impedance: right 499 ohms     Parameters  Mode: DDD  Base Rate: 60/130     Diagnostic Data  Atrial paced: 43 %   Ventricular paced: <1 %  Mode switch: <1%  AT/AF [place]: <1%  AHR: 0  VHR: 1 non-sustained     Intrinsic Rate: <30     Changes made: Changed RA output to 1.5 V.     Conclusions: normal device function. Follow up in 1 months for LULU check.     Assessment:  1. Sick sinus syndrome (HCC)    2. 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   • CHF (congestive heart failure) (HCC)    • COPD (chronic obstructive pulmonary disease) (HCC)    • High blood pressure    • Hyperlipidemia    • Pacemaker    • Stage 3 chronic kidney disease (HCC)    • Stroke (HCC)     TIA         Past Surgical History:   Procedure Laterality Date   • BLADDER SURGERY     • CARDIAC ELECTROPHYSIOLOGY PROCEDURE N/A 2022    Procedure: PPM generator change - dual;  Surgeon: Shiloh Medrano MD;  Location: [facility] INVASIVE LOCATION;  Service: Cardiology;  Laterality: N/A;   • CARDIAC SURGERY     • COLON RESECTION      x 2 small bowel   • HYSTERECTOMY     • PACEMAKER IMPLANTATION     • VAGINAL BIOPSY           Family History   Problem Relation Age of Onset   • Cancer Mother    • Diabetes Father    • Diabetes Sister    • Diabetes Brother          Social History     Socioeconomic History   • Marital status:    Tobacco Use   • Smoking status: Former Smoker     Packs/day: 2.00     Years: 45.00     Pack years: 90.00     Start date: 3/12/1953     Quit date: 3/12/1993     Years since quittin.3   • Smokeless tobacco: Never Used   Vaping Use   • Vaping Use: Never used   Substance and Sexual Activity   • Alcohol use: Not Currently   • Drug use: Never   • Sexual activity: Defer         Current Outpatient Medications   Medication Sig Dispense Refill   • albuterol sulfate  (90 Base) MCG/ACT inhaler Inhale 2 puffs Every 4 (Four) Hours As Needed for Wheezing. 18 g 6   • allopurinol (ZYLOPRIM) 100 MG tablet Take 100 mg by mouth Daily.     • apixaban (ELIQUIS) 5 MG tablet tablet Take 5 mg by mouth 2 (Two) Times a Day.     • aspirin (aspirin) 81 MG EC tablet Take 1 tablet by mouth Daily. 90 tablet 1   • atorvastatin (LIPITOR) 20 MG tablet Take 20 mg by mouth Daily.     • budesonide-formoterol (SYMBICORT) 80-4.5 MCG/ACT inhaler Inhale 2 puffs 2 (Two) Times a  "Day. 1 each 11   • calcium carbonate (OS-MICHELL) 600 MG tablet Take 600 mg by mouth Daily.     • Cranberry 1000 MG capsule Take 4,200 mg by mouth Daily.     • Cyanocobalamin (VITAMIN B 12 PO) Take 1,000 mcg by mouth Daily.     • Garlic 1000 MG capsule Take 1,000 mg by mouth Daily.     • lisinopril (PRINIVIL,ZESTRIL) 20 MG tablet Take 10 mg by mouth Daily.     • metoprolol tartrate (LOPRESSOR) 100 MG tablet Take 100 mg by mouth 2 (Two) Times a Day.     • vitamin C (ASCORBIC ACID) 250 MG tablet Take 250 mg by mouth 2 (Two) Times a Day.       No current facility-administered medications for this visit.           Review of Systems:     Constitutional:  Denies recent weight loss, weight gain,no change in exercise tolerance.     HENT:  Denies any hearing loss, epistaxis    Eyes: No blurring    Respiratory: Severe COPD mild baseline shortness    Cardiovascular: See H&P    Gastrointestinal:  Denies change in bowel habits and dyspepsia    Endocrine: Negative for cold intolerance, heat intolerance, polydipsia    Genitourinary: Negative.      Musculoskeletal: History of osteoarthritis    Skin:  Deniesrashes, or skin lesions.     Allergic/Immunologic: Negative.  Negative for environmental allergies    Neurological:  Denies any history of recurrent headaches, strokes,     Hematological: Denies any food allergies, seasonal allergies    Psychiatric/Behavioral: Denies any history of depression        OBJECTIVE:    /84 (BP Location: Right arm, Patient Position: Sitting, Cuff Size: Adult)   Pulse 84   Ht 152.4 cm (60\")   Wt 83.7 kg (184 lb 9.6 oz)   SpO2 96%   BMI 36.05 kg/m²     Physical Exam:     Constitutional: Cooperative, alert and oriented, well-developed, well-nourished, in no acute distress.     HENT:   Head: Normocephalic, conjunctive is a pink, thyroid is nonpalpable no carotid bruit and trachea central.     Cardiovascular: Regular rhythm, S1 and S2 normal, no S3 or S4. Apical impulse not displaced. No " murmurs    Pulmonary/Chest: Chest: No chest wall tenderness no rales and wheezing    Abdominal: Abdomen soft, bowel sounds normoactive, no masses,    Musculoskeletal: No deformities, clubbing, cyanosis, erythema.   Neurological: No gross motor or sensory deficits noted    Skin: Warm and dry to the touch, no apparent skin lesions .     Psychiatric: He has a normal mood and affect. His behavior is normal        Procedures      Lab Results   Component Value Date    WBC 10.72 05/26/2022    HGB 13.6 05/26/2022    HCT 39.7 05/26/2022    MCV 88.6 05/26/2022     05/26/2022     Lab Results   Component Value Date    GLUCOSE 93 05/26/2022    BUN 19 05/26/2022    CREATININE 1.47 (H) 05/26/2022    EGFRIFNONA 43 (L) 07/06/2021    BCR 12.9 05/26/2022    CO2 23.0 05/26/2022    CALCIUM 10.1 05/26/2022    ALBUMIN 3.80 05/17/2021    AST 16 05/17/2021    ALT 10 05/17/2021     Lab Results   Component Value Date    CHOL 148 05/17/2021     Lab Results   Component Value Date    TRIG 136 05/17/2021     Lab Results   Component Value Date    HDL 33 (L) 05/17/2021     No components found for: LDLCALC  Lab Results   Component Value Date    LDL 91 05/17/2021     No results found for: HDLLDLRATIO  No components found for: CHOLHDL  No results found for: HGBA1C  No results found for: TSH, W2WZIJI, S1XGXKR, THYROIDAB        ASSESSMENT AND PLAN:    #1 chronic congestive heart failure due to preserved left and systolic      76-year-old years old patient with history of hypertension with hypertensive heart disease, diastolic dysfunction relaxation abnormality abnormality in preserved left ventricle systolic function no previous echocardiogram.      Patient clinically well perfused, euvolemic and no evidence of congestive heart failure at the time of evaluations.  Her blood pressure is reasonably well controlled and she will continue metoprolol had history of chronic renal insufficiency.    #2 sick sinus syndrome  s/p pacemaker generator  replacement pleased with her clinical outcome      3 paroxysmal atrial fibrillation continue metoprolol         [id number] score is 4 continue oral anticoagulation to decrease risk of cardiac embolization        #4 nonrheumatic mitral regurgitation    Clinically there is no evidence of progression     #5 history of nonobstructive CAD continue aspirin and atorvastatin    Patient had brief episode of chest pain lasting less than a minute or so unable to describe describe the quality with no radiation.  Refused further risk stratification and not able to want to take Imdur at this stage      Preventive    Obesity with a BMI of 36.05      Significantly low carbohydrate, low-fat, DASH diet graded exercise discussed with the patient    I spent 16 minutes caring for Pennie on this date of service. This time includes time spent by me of counseling/coordination of care as relates to the presenting problem and any ordered procedures/tests as outlined above.           This document has been electronically signed by Shiloh Medrano MD on July 8, 2022 11:54 CDT      Diagnoses and all orders for this visit:    1. Sick sinus syndrome (HCC) (Primary)    2. Nonrheumatic mitral valve regurgitation    3. Chronic diastolic congestive heart failure (HCC)    4. Paroxysmal atrial fibrillation (HCC)          Shiloh Medrano MD  7/8/2022  11:46 CDT

## 2022-07-11 RX ORDER — METOPROLOL TARTRATE 100 MG/1
TABLET ORAL
Qty: 180 TABLET | Refills: 0 | Status: SHIPPED | OUTPATIENT
Start: 2022-07-11 | End: 2022-10-31

## 2022-07-11 NOTE — TELEPHONE ENCOUNTER
Rx Refill Note  Requested Prescriptions     Pending Prescriptions Disp Refills   • metoprolol tartrate (LOPRESSOR) 100 MG tablet [Pharmacy Med Name: Metoprolol Tartrate 100 MG Oral Tablet] 180 tablet 0     Sig: Take 1 tablet by mouth twice daily      Last office visit with prescribing clinician: 2/7/2022      Next office visit with prescribing clinician: 7/14/2022            [place] Rep  07/11/22, 09:17 CDT

## 2022-07-14 ENCOUNTER — LAB (OUTPATIENT)
Dept: LAB | Facility: HOSPITAL | Age: 77
End: 2022-07-14

## 2022-07-14 ENCOUNTER — OFFICE VISIT (OUTPATIENT)
Dept: FAMILY MEDICINE CLINIC | Facility: CLINIC | Age: 77
End: 2022-07-14

## 2022-07-14 VITALS
HEART RATE: 60 BPM | TEMPERATURE: 96.9 F | SYSTOLIC BLOOD PRESSURE: 120 MMHG | HEIGHT: 60 IN | DIASTOLIC BLOOD PRESSURE: 62 MMHG | WEIGHT: 187 LBS | BODY MASS INDEX: 36.71 KG/M2 | OXYGEN SATURATION: 95 %

## 2022-07-14 DIAGNOSIS — E55.9 VITAMIN D DEFICIENCY: ICD-10-CM

## 2022-07-14 DIAGNOSIS — G89.29 CHRONIC PAIN OF RIGHT THUMB: Primary | ICD-10-CM

## 2022-07-14 DIAGNOSIS — E78.5 HYPERLIPIDEMIA, UNSPECIFIED HYPERLIPIDEMIA TYPE: ICD-10-CM

## 2022-07-14 DIAGNOSIS — N18.32 STAGE 3B CHRONIC KIDNEY DISEASE: ICD-10-CM

## 2022-07-14 DIAGNOSIS — M1A.9XX0 CHRONIC GOUT WITHOUT TOPHUS, UNSPECIFIED CAUSE, UNSPECIFIED SITE: ICD-10-CM

## 2022-07-14 DIAGNOSIS — M79.644 CHRONIC PAIN OF RIGHT THUMB: Primary | ICD-10-CM

## 2022-07-14 LAB
25(OH)D3 SERPL-MCNC: 34.1 NG/ML (ref 30–100)
ALBUMIN SERPL-MCNC: 3.9 G/DL (ref 3.5–5.2)
ALBUMIN/GLOB SERPL: 1.7 G/DL
ALP SERPL-CCNC: 88 U/L (ref 39–117)
ALT SERPL W P-5'-P-CCNC: 15 U/L (ref 1–33)
ANION GAP SERPL CALCULATED.3IONS-SCNC: 8.9 MMOL/L (ref 5–15)
AST SERPL-CCNC: 23 U/L (ref 1–32)
BASOPHILS # BLD AUTO: 0.11 10*3/MM3 (ref 0–0.2)
BASOPHILS NFR BLD AUTO: 0.9 % (ref 0–1.5)
BILIRUB SERPL-MCNC: 0.4 MG/DL (ref 0–1.2)
BUN SERPL-MCNC: 17 MG/DL (ref 8–23)
BUN/CREAT SERPL: 13.3 (ref 7–25)
CALCIUM SPEC-SCNC: 9.4 MG/DL (ref 8.6–10.5)
CHLORIDE SERPL-SCNC: 109 MMOL/L (ref 98–107)
CHOLEST SERPL-MCNC: 84 MG/DL (ref 0–200)
CO2 SERPL-SCNC: 22.1 MMOL/L (ref 22–29)
CREAT SERPL-MCNC: 1.28 MG/DL (ref 0.57–1)
DEPRECATED RDW RBC AUTO: 44.2 FL (ref 37–54)
EGFRCR SERPLBLD CKD-EPI 2021: 42.7 ML/MIN/1.73
EOSINOPHIL # BLD AUTO: 0.44 10*3/MM3 (ref 0–0.4)
EOSINOPHIL NFR BLD AUTO: 3.5 % (ref 0.3–6.2)
ERYTHROCYTE [DISTWIDTH] IN BLOOD BY AUTOMATED COUNT: 14.2 % (ref 12.3–15.4)
GLOBULIN UR ELPH-MCNC: 2.3 GM/DL
GLUCOSE SERPL-MCNC: 89 MG/DL (ref 65–99)
HCT VFR BLD AUTO: 38.4 % (ref 34–46.6)
HDLC SERPL-MCNC: 41 MG/DL (ref 40–60)
HGB BLD-MCNC: 13.3 G/DL (ref 12–15.9)
IMM GRANULOCYTES # BLD AUTO: 0.04 10*3/MM3 (ref 0–0.05)
IMM GRANULOCYTES NFR BLD AUTO: 0.3 % (ref 0–0.5)
LDLC SERPL CALC-MCNC: 24 MG/DL (ref 0–100)
LDLC/HDLC SERPL: 0.56 {RATIO}
LYMPHOCYTES # BLD AUTO: 4.37 10*3/MM3 (ref 0.7–3.1)
LYMPHOCYTES NFR BLD AUTO: 34.5 % (ref 19.6–45.3)
MCH RBC QN AUTO: 30.4 PG (ref 26.6–33)
MCHC RBC AUTO-ENTMCNC: 34.6 G/DL (ref 31.5–35.7)
MCV RBC AUTO: 87.7 FL (ref 79–97)
MONOCYTES # BLD AUTO: 0.87 10*3/MM3 (ref 0.1–0.9)
MONOCYTES NFR BLD AUTO: 6.9 % (ref 5–12)
NEUTROPHILS NFR BLD AUTO: 53.9 % (ref 42.7–76)
NEUTROPHILS NFR BLD AUTO: 6.84 10*3/MM3 (ref 1.7–7)
NRBC BLD AUTO-RTO: 0.1 /100 WBC (ref 0–0.2)
PLATELET # BLD AUTO: 283 10*3/MM3 (ref 140–450)
PMV BLD AUTO: 10.5 FL (ref 6–12)
POTASSIUM SERPL-SCNC: 5.3 MMOL/L (ref 3.5–5.2)
PROT SERPL-MCNC: 6.2 G/DL (ref 6–8.5)
RBC # BLD AUTO: 4.38 10*6/MM3 (ref 3.77–5.28)
SODIUM SERPL-SCNC: 140 MMOL/L (ref 136–145)
TRIGL SERPL-MCNC: 101 MG/DL (ref 0–150)
URATE SERPL-MCNC: 5 MG/DL (ref 2.4–5.7)
VLDLC SERPL-MCNC: 19 MG/DL (ref 5–40)
WBC NRBC COR # BLD: 12.67 10*3/MM3 (ref 3.4–10.8)

## 2022-07-14 PROCEDURE — 80053 COMPREHEN METABOLIC PANEL: CPT | Performed by: STUDENT IN AN ORGANIZED HEALTH CARE EDUCATION/TRAINING PROGRAM

## 2022-07-14 PROCEDURE — 85025 COMPLETE CBC W/AUTO DIFF WBC: CPT | Performed by: STUDENT IN AN ORGANIZED HEALTH CARE EDUCATION/TRAINING PROGRAM

## 2022-07-14 PROCEDURE — 80061 LIPID PANEL: CPT | Performed by: STUDENT IN AN ORGANIZED HEALTH CARE EDUCATION/TRAINING PROGRAM

## 2022-07-14 PROCEDURE — 84550 ASSAY OF BLOOD/URIC ACID: CPT | Performed by: STUDENT IN AN ORGANIZED HEALTH CARE EDUCATION/TRAINING PROGRAM

## 2022-07-14 PROCEDURE — 99213 OFFICE O/P EST LOW 20 MIN: CPT | Performed by: STUDENT IN AN ORGANIZED HEALTH CARE EDUCATION/TRAINING PROGRAM

## 2022-07-14 PROCEDURE — 82306 VITAMIN D 25 HYDROXY: CPT | Performed by: STUDENT IN AN ORGANIZED HEALTH CARE EDUCATION/TRAINING PROGRAM

## 2022-07-14 NOTE — PROGRESS NOTES
"Subjective:  Pennie Gibson is a 77 y.o. female who presents for     Right thumb pain; states first noticed approximately 1-2 months ago, does not recall an inciting event. Associated with tingling sensation in thumb, ring finger, middle finger, associated with swelling in thumb joint. Intermittent in nature, 4/10 in intensity, has not improved or gotten worse over the past month.      Patient Active Problem List   Diagnosis   • Seasonal allergies   • Acute UTI   • Congestive heart failure (HCC)   • Nonrheumatic mitral valve regurgitation   • Nonrheumatic tricuspid valve regurgitation   • Sick sinus syndrome (HCC)   • Cardiac pacemaker in situ   • Atrial fibrillation (HCC)   • Gout   • Chronic kidney disease   • Moderate persistent asthma without complication   • Complications, mechanical, pacemaker, cardiac     Vitals:    Vitals:    07/14/22 0815   BP: 120/62   BP Location: Right arm   Patient Position: Sitting   Cuff Size: Adult   Pulse: 60   Temp: 96.9 °F (36.1 °C)   SpO2: 95%   Weight: 84.8 kg (187 lb)   Height: 152.4 cm (60\")     Body mass index is 36.52 kg/m².      Current Outpatient Medications:   •  albuterol sulfate  (90 Base) MCG/ACT inhaler, Inhale 2 puffs Every 4 (Four) Hours As Needed for Wheezing., Disp: 18 g, Rfl: 6  •  allopurinol (ZYLOPRIM) 100 MG tablet, Take 100 mg by mouth Daily., Disp: , Rfl:   •  apixaban (ELIQUIS) 5 MG tablet tablet, Take 5 mg by mouth 2 (Two) Times a Day., Disp: , Rfl:   •  aspirin (aspirin) 81 MG EC tablet, Take 1 tablet by mouth Daily., Disp: 90 tablet, Rfl: 1  •  atorvastatin (LIPITOR) 20 MG tablet, Take 20 mg by mouth Daily., Disp: , Rfl:   •  budesonide-formoterol (SYMBICORT) 80-4.5 MCG/ACT inhaler, Inhale 2 puffs 2 (Two) Times a Day., Disp: 1 each, Rfl: 11  •  calcium carbonate (OS-MICHELL) 600 MG tablet, Take 600 mg by mouth Daily., Disp: , Rfl:   •  Cranberry 1000 MG capsule, Take 4,200 mg by mouth Daily., Disp: , Rfl:   •  Cyanocobalamin (VITAMIN B 12 PO), Take " 1,000 mcg by mouth Daily., Disp: , Rfl:   •  Garlic 1000 MG capsule, Take 1,000 mg by mouth Daily., Disp: , Rfl:   •  lisinopril (PRINIVIL,ZESTRIL) 20 MG tablet, Take 10 mg by mouth Daily., Disp: , Rfl:   •  metoprolol tartrate (LOPRESSOR) 100 MG tablet, Take 1 tablet by mouth twice daily, Disp: 180 tablet, Rfl: 0  •  vitamin C (ASCORBIC ACID) 250 MG tablet, Take 250 mg by mouth 2 (Two) Times a Day., Disp: , Rfl:     Patient Active Problem List   Diagnosis   • Seasonal allergies   • Acute UTI   • Congestive heart failure (HCC)   • Nonrheumatic mitral valve regurgitation   • Nonrheumatic tricuspid valve regurgitation   • Sick sinus syndrome (HCC)   • Cardiac pacemaker in situ   • Atrial fibrillation (HCC)   • Gout   • Chronic kidney disease   • Moderate persistent asthma without complication   • Complications, mechanical, pacemaker, cardiac     Past Surgical History:   Procedure Laterality Date   • BLADDER SURGERY     • CARDIAC ELECTROPHYSIOLOGY PROCEDURE N/A 2022    Procedure: PPM generator change - dual;  Surgeon: Shiloh Medrano MD;  Location: [facility] INVASIVE LOCATION;  Service: Cardiology;  Laterality: N/A;   • CARDIAC SURGERY     • COLON RESECTION      x 2 small bowel   • HYSTERECTOMY     • PACEMAKER IMPLANTATION     • VAGINAL BIOPSY       Social History     Socioeconomic History   • Marital status:    Tobacco Use   • Smoking status: Former Smoker     Packs/day: 2.00     Years: 45.00     Pack years: 90.00     Start date: 3/12/1953     Quit date: 3/12/1993     Years since quittin.3   • Smokeless tobacco: Never Used   Vaping Use   • Vaping Use: Never used   Substance and Sexual Activity   • Alcohol use: Not Currently   • Drug use: Never   • Sexual activity: Defer     Family History   Problem Relation Age of Onset   • Cancer Mother    • Diabetes Father    • Diabetes Sister    • Diabetes Brother      Admission on 2022, Discharged on 2022   Component Date Value Ref Range Status   •  WBC 05/26/2022 10.72  3.40 - 10.80 10*3/mm3 Final   • RBC 05/26/2022 4.48  3.77 - 5.28 10*6/mm3 Final   • Hemoglobin 05/26/2022 13.6  12.0 - 15.9 g/dL Final   • Hematocrit 05/26/2022 39.7  34.0 - 46.6 % Final   • MCV 05/26/2022 88.6  79.0 - 97.0 fL Final   • MCH 05/26/2022 30.4  26.6 - 33.0 pg Final   • MCHC 05/26/2022 34.3  31.5 - 35.7 g/dL Final   • RDW 05/26/2022 14.6  12.3 - 15.4 % Final   • RDW-SD 05/26/2022 46.2  37.0 - 54.0 fl Final   • MPV 05/26/2022 9.0  6.0 - 12.0 fL Final   • Platelets 05/26/2022 239  140 - 450 10*3/mm3 Final   • Glucose 05/26/2022 93  65 - 99 mg/dL Final   • BUN 05/26/2022 19  8 - 23 mg/dL Final   • Creatinine 05/26/2022 1.47 (A) 0.57 - 1.00 mg/dL Final   • Sodium 05/26/2022 143  136 - 145 mmol/L Final   • Potassium 05/26/2022 4.7  3.5 - 5.2 mmol/L Final   • Chloride 05/26/2022 110 (A) 98 - 107 mmol/L Final   • CO2 05/26/2022 23.0  22.0 - 29.0 mmol/L Final   • Calcium 05/26/2022 10.1  8.6 - 10.5 mg/dL Final   • BUN/Creatinine Ratio 05/26/2022 12.9  7.0 - 25.0 Final   • Anion Gap 05/26/2022 10.0  5.0 - 15.0 mmol/L Final   • eGFR 05/26/2022 36.2 (A) >60.0 mL/min/1.73 Final    National Kidney Foundation and American Society of Nephrology (ASN) Task Force recommended calculation based on the Chronic Kidney Disease Epidemiology Collaboration (CKD-EPI) equation refit without adjustment for race.   • Protime 05/26/2022 13.7  11.1 - 15.3 Seconds Final   • INR 05/26/2022 1.07  0.80 - 1.20 Final   • QT Interval 05/26/2022 448  ms Final   • QTC Interval 05/26/2022 448  ms Final      EP/CRM Study  Date of Procedure: 05/26/22    Referring Physician:     /ELECTROPHYSIOLOGIST:     PROCEDURE(S) PERFORMED:    1. Removal of a dual-chamber permanent pacemaker generator.  2. Insertion of a dual-chamber permanent pacemaker generator.    INDICATIONS FOR PROCDEDURE: Pacemaker reached LULU status    MEDICATION(S) GIVEN TO PATIENT DURING THIS PROCEDURE:  1. Versed.  2. Fentanyl.  3. Clindamycin  gentamicin    DESCRIPTION(S) OF THE PROCEDURE: The patient was brought to the   cardiovascular laboratory in a non-sedated state. The risks and benefits   of conscious sedation and revision of her system were explained to the   patient and written, informed consent was obtained. The patient was then   prepped and draped in the usual sterile fashion. The left infraclavicular   fossa was anesthetized with 1% Lidocaine and the skin was incised using a   scalpel. The old pocket was then entered, and the floor of the capsule was   partially debrided. The old PM generator was subsequently removed. The   right ventricular lead pacing threshold was measured at 0.4 at 0.4   milliseconds with an R wave measured at 18 millivolts and a lead impedance   measured at 464 ohms. The right atrial lead pacing threshold was measured   at 0.8 volts at 0.4 milliseconds with a P wave measured at 4 millivolts   and a lead impedance measured at 485 ohms. The leads were attached to the   new pacemaker generator, which was then placed in the pocket after the   pocket was irrigated with vancomycin solution. The pocket was then   irrigated with vancomycin solution. The pocket was closed with 2-0 Vicryl,   and the skin was closed with 3-0 Vicryl and 4-0 Vicryl. Steri-Strips and a   pressure dressing were then applied. Estimated blood loss was minimal.   Hemostasis was adequate. The pacemaker was then programmed to its final   setting. The patient tolerated the procedure without difficulty and was   transferred to their room in stable condition.  Old pacemaker explanted    [employer] date 5/18/2015 explantation   5/26/2022 reason is LULU status.  Leads attached to the new pacemaker    [employer] model number L3 1 1 and serial #[id number].    COMPLICATIONS: None  ESTIMATED BLOOD LOSS: Minimal  SPECIMENS: None    FINAL PROGRAM PARAMETERS: The device was set at DDDR with a lower rate of   60 beats per  minute and upper rate of 120 beats per minute with prolonged   A-V delay.     FINAL IMPRESSIONS:    1. Successful DDD PM generator removal.   2. Successful insertion of a new dual-chamber permanent pacemaker   generator.    RECOMMENDATION(S):  1. The patient will be monitored on telemetry.  2. If stable, the patient will be discharged home later today.    Shiloh Medrano MD  05/26/22  11:18 CDT    Part of this note may be an electronic transcription/translation of spoken   language to printed text using the Dragon Dictation system.       [unfilled]  Immunization History   Administered Date(s) Administered   • COVID-19 (MODERNA) 1st, 2nd, 3rd Dose Only 09/25/2021, 10/23/2021   • Fluzone High-Dose 65+yrs 12/20/2021   • Influenza, Unspecified 01/10/2018   • Pneumococcal Polysaccharide (PPSV23) 02/07/2022   • Pneumococcal, Unspecified 01/01/2013   • Tdap 12/20/2021     The following portions of the patient's history were reviewed and updated as appropriate: allergies, current medications, past family history, past medical history, past social history, past surgical history and problem list.    PHQ-9 Total Score:           Physical Exam  Constitutional:       Appearance: Normal appearance.   HENT:      Head: Normocephalic and atraumatic.      Right Ear: External ear normal.      Left Ear: External ear normal.   Eyes:      General:         Right eye: No discharge.         Left eye: No discharge.      Conjunctiva/sclera: Conjunctivae normal.   Cardiovascular:      Rate and Rhythm: Normal rate and regular rhythm.      Pulses: Normal pulses.      Heart sounds: Normal heart sounds. No murmur heard.  Pulmonary:      Effort: Pulmonary effort is normal. No respiratory distress.      Breath sounds: Normal breath sounds.   Abdominal:      General: There is no distension.      Palpations: Abdomen is soft.      Tenderness: There is no abdominal tenderness.   Musculoskeletal:      Right hand: Swelling ( right thumb, hypertrophy of  MCP ) present.      Cervical back: Normal range of motion.      Right lower leg: No edema.      Left lower leg: No edema.   Lymphadenopathy:      Cervical: No cervical adenopathy.   Neurological:      Mental Status: She is alert. Mental status is at baseline.   Psychiatric:         Mood and Affect: Mood normal.         Behavior: Behavior normal.       Assessment & Plan    Diagnosis Plan   1. Chronic pain of right thumb     2. Vitamin D deficiency  Vitamin D 25 Hydroxy   3. Hyperlipidemia, unspecified hyperlipidemia type  Lipid Panel   4. Stage 3b chronic kidney disease (HCC)  CBC & Differential    Comprehensive Metabolic Panel   5. Chronic gout without tophus, unspecified cause, unspecified site  Uric acid      Orders Placed This Encounter   Procedures   • Comprehensive Metabolic Panel     Order Specific Question:   Release to patient     Answer:   Immediate   • Lipid Panel   • Vitamin D 25 Hydroxy     Order Specific Question:   Release to patient     Answer:   Immediate   • Uric acid     Order Specific Question:   Release to patient     Answer:   Immediate   • CBC & Differential     Order Specific Question:   Manual Differential     Answer:   No     Right thumb pain; likely osteoarthritis due to prior trauma, counseled patient.  Continue with conservative therapy, consider wrist brace at follow-up if needed.  Negative Phalen's, negative Tinel's, no signs of carpal tunnel.    Multiple chronic medical conditions as above, needs labs, well-controlled on current medications, will continue current management, adjust as needed.  Denied symptoms, vital stable, reassuring.  Follow-up in 6 months or sooner if needed          This document has been electronically signed by Isaac Love MD on July 14, 2022 09:09 CDT    EMR Dragon/Transciption Disclaimer: Some of this note may be an electronic transcription/translation of spoken language to printed text.  The electronic translation of spoken language may permit erroneous, or  at times, nonsensical words or phrases to be inadvertently transcribed. Although I have reviewed the note for such errors, some may still exist.

## 2022-08-03 ENCOUNTER — OFFICE VISIT (OUTPATIENT)
Dept: CARDIOLOGY | Facility: CLINIC | Age: 77
End: 2022-08-03

## 2022-08-03 VITALS
DIASTOLIC BLOOD PRESSURE: 68 MMHG | HEIGHT: 60 IN | OXYGEN SATURATION: 94 % | WEIGHT: 186 LBS | SYSTOLIC BLOOD PRESSURE: 114 MMHG | BODY MASS INDEX: 36.52 KG/M2 | HEART RATE: 60 BPM

## 2022-08-03 DIAGNOSIS — I49.5 SICK SINUS SYNDROME: ICD-10-CM

## 2022-08-03 DIAGNOSIS — I50.32 CHRONIC DIASTOLIC CONGESTIVE HEART FAILURE: Primary | ICD-10-CM

## 2022-08-03 DIAGNOSIS — I34.0 NONRHEUMATIC MITRAL VALVE REGURGITATION: ICD-10-CM

## 2022-08-03 DIAGNOSIS — I48.0 PAROXYSMAL ATRIAL FIBRILLATION: ICD-10-CM

## 2022-08-03 PROCEDURE — 99024 POSTOP FOLLOW-UP VISIT: CPT | Performed by: INTERNAL MEDICINE

## 2022-08-03 NOTE — PROGRESS NOTES
Pennie Gibson  77 y.o. female    8/3/2022  1. Chronic diastolic congestive heart failure (HCC)    2. Nonrheumatic mitral valve regurgitation    3. Sick sinus syndrome (HCC)    4. Paroxysmal atrial fibrillation (HCC)        History of Present Illness:  Body mass index is 36.33 kg/m². BMI is above normal parameters. Recommendations include: exercise counseling, nutrition counseling and referral to primary care.    77 years old patient presented today, 8/3/2022 for routine follow-up with concurrent medical problem of nonobstructive CAD, s/p pacemaker placement with history of sick sinus syndrome, paroxysmal atrial fibrillation, stage III chronic kidney disease, hyperlipidemia and CVA.  No symptom of cardiac decompensation such orthopnea PND chest pain or dizziness reported.  She had cardiac catheterization in 2010 which revealed mild nonobstructive disease in RCA cardiac cath in 2026 no significant CAD noted.      Date of Procedure: 22     Referring Physician:      /ELECTROPHYSIOLOGIST:            PROCEDURE(S) PERFORMED:    1. Removal of a dual-chamber permanent pacemaker generator.  2. Insertion of a dual-chamber permanent pacemaker generator.     INDICATIONS FOR PROCDEDURE:            Pacemaker reached LULU status    Echo 2021    · Estimated left ventricular EF = 61% Left ventricular ejection fraction appears to be 61 - 65%. Left ventricular systolic function is normal.  · Left ventricular diastolic function is consistent with (grade Ia w/high LAP) impaired relaxation.  · Left atrial volume is moderately increased.  · Moderate mitral valve regurgitation is present.  · Estimated right ventricular systolic pressure from tricuspid regurgitation is normal (<35 mmHg).    Implant date: 2013     Reason for evaluation:routine  Cardiac device indication(s): sick sinus syndrome     Battery  LULU: <3 months                Interrogation Results  Atrial sensin.6 mV  Atrial  capture: 0.7 V @ 0.4 ms   Atrial lead impedance: 494 ohms  Ventricular sensing: R wave: 19.1 mV @ 70 bpm  Ventricular capture: 0.5 V @ 0.4 ms   Ventricular lead impedance: right 499 ohms     Parameters  Mode: DDD  Base Rate: 60/130     Diagnostic Data  Atrial paced: 43 %   Ventricular paced: <1 %  Mode switch: <1%  AT/AF Kaufman: <1%  AHR: 0  VHR: 1 non-sustained     Intrinsic Rate: <30     Changes made: Changed RA output to 1.5 V.     Conclusions: normal device function. Follow up in 1 months for LULU check.     Assessment:  1. Sick sinus syndrome (HCC)    2. 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   • CHF (congestive heart failure) (HCC)    • COPD (chronic obstructive pulmonary disease) (HCC)    • High blood pressure    • Hyperlipidemia    • Pacemaker    • Stage 3 chronic kidney disease (HCC)    • Stroke (HCC)     TIA         Past Surgical History:   Procedure Laterality Date   • BLADDER SURGERY     • CARDIAC ELECTROPHYSIOLOGY PROCEDURE N/A 5/26/2022    Procedure: PPM generator change - dual;  Surgeon: Shiloh Medrano MD;  Location: [facility] INVASIVE LOCATION;  Service: Cardiology;  Laterality: N/A;   • CARDIAC SURGERY     • COLON RESECTION      x 2 small bowel   • HYSTERECTOMY     • PACEMAKER IMPLANTATION     • VAGINAL BIOPSY           Family History   Problem Relation Age of Onset   • Cancer Mother    • Diabetes Father    • Diabetes Sister    • Diabetes Brother          Social History     Socioeconomic History   • Marital status:    Tobacco Use   • Smoking status: Former  Smoker     Packs/day: 2.00     Years: 45.00     Pack years: 90.00     Start date: 3/[birth year]/1953     Quit date: 3/12/1993     Years since quittin.4   • Smokeless tobacco: Never Used   Vaping Use   • Vaping Use: Never used   Substance and Sexual Activity   • Alcohol use: Not Currently   • Drug use: Never   • Sexual activity: Defer         Current Outpatient Medications   Medication Sig Dispense Refill   • albuterol sulfate  (90 Base) MCG/ACT inhaler Inhale 2 puffs Every 4 (Four) Hours As Needed for Wheezing. 18 g 6   • allopurinol (ZYLOPRIM) 100 MG tablet Take 100 mg by mouth Daily.     • apixaban (ELIQUIS) 5 MG tablet tablet Take 5 mg by mouth 2 (Two) Times a Day.     • aspirin (aspirin) 81 MG EC tablet Take 1 tablet by mouth Daily. 90 tablet 1   • atorvastatin (LIPITOR) 20 MG tablet Take 20 mg by mouth Daily.     • budesonide-formoterol (SYMBICORT) 80-4.5 MCG/ACT inhaler Inhale 2 puffs 2 (Two) Times a Day. 1 each 11   • calcium carbonate (OS-MICHELL) 600 MG tablet Take 600 mg by mouth Daily.     • Cranberry 1000 MG capsule Take 4,200 mg by mouth Daily.     • Cyanocobalamin (VITAMIN B 12 PO) Take 1,000 mcg by mouth Daily.     • Garlic 1000 MG capsule Take 1,000 mg by mouth Daily.     • lisinopril (PRINIVIL,ZESTRIL) 20 MG tablet Take 10 mg by mouth Daily.     • metoprolol tartrate (LOPRESSOR) 100 MG tablet Take 1 tablet by mouth twice daily 180 tablet 0   • vitamin C (ASCORBIC ACID) 250 MG tablet Take 250 mg by mouth 2 (Two) Times a Day.       No current facility-administered medications for this visit.           Review of Systems:   Today, 8/3/2022 no change was noted in the review of system compared to the previous  Constitutional:  Denies recent weight loss, weight gain,no change in exercise tolerance.     HENT:  Denies any hearing loss, epistaxis    Eyes: No blurring    Respiratory: Severe COPD mild baseline shortness    Cardiovascular: See H&P    Gastrointestinal:  Denies change in bowel habits and  "dyspepsia    Endocrine: Negative for cold intolerance, heat intolerance, polydipsia    Genitourinary: Negative.      Musculoskeletal: History of osteoarthritis    Skin:  Deniesrashes, or skin lesions.     Allergic/Immunologic: Negative.  Negative for environmental allergies    Neurological:  Denies any history of recurrent headaches, strokes,     Hematological: Denies any food allergies, seasonal allergies    Psychiatric/Behavioral: Denies any history of depression        OBJECTIVE:    /68 (BP Location: Right arm, Patient Position: Sitting, Cuff Size: Adult)   Pulse 60   Ht 152.4 cm (60\")   Wt 84.4 kg (186 lb)   SpO2 94%   BMI 36.33 kg/m²     Physical Exam:   Today dated 8/3/2022 no change was noted in physical exam comparison to previous  Constitutional: Cooperative, alert and oriented, well-developed, well-nourished, in no acute distress.     HENT:   Head: Normocephalic, conjunctive is a pink, thyroid is nonpalpable no carotid bruit and trachea central.     Cardiovascular: Regular rhythm, S1 and S2 normal, no S3 or S4. Apical impulse not displaced. No murmurs    Pulmonary/Chest: Chest: No chest wall tenderness no rales and wheezing    Abdominal: Abdomen soft, bowel sounds normoactive, no masses,    Musculoskeletal: No deformities, clubbing, cyanosis, erythema.   Neurological: No gross motor or sensory deficits noted    Skin: Warm and dry to the touch, no apparent skin lesions .     Psychiatric: He has a normal mood and affect. His behavior is normal        Procedures      Lab Results   Component Value Date    WBC 12.67 (H) 07/14/2022    HGB 13.3 07/14/2022    HCT 38.4 07/14/2022    MCV 87.7 07/14/2022     07/14/2022     Lab Results   Component Value Date    GLUCOSE 89 07/14/2022    BUN 17 07/14/2022    CREATININE 1.28 (H) 07/14/2022    EGFRIFNONA 43 (L) 07/06/2021    BCR 13.3 07/14/2022    CO2 22.1 07/14/2022    CALCIUM 9.4 07/14/2022    ALBUMIN 3.90 07/14/2022    AST 23 07/14/2022    ALT 15 " 07/14/2022     Lab Results   Component Value Date    CHOL 84 07/14/2022    CHOL 148 05/17/2021     Lab Results   Component Value Date    TRIG 101 07/14/2022    TRIG 136 05/17/2021     Lab Results   Component Value Date    HDL 41 07/14/2022    HDL 33 (L) 05/17/2021     No components found for: LDLCALC  Lab Results   Component Value Date    LDL 24 07/14/2022    LDL 91 05/17/2021     No results found for: HDLLDLRATIO  No components found for: CHOLHDL  No results found for: HGBA1C  No results found for: TSH, F0DXAXW, L2JFJSI, THYROIDAB        ASSESSMENT AND PLAN:    #1 chronic congestive heart failure due to preserved left ventricular systolic    She is asymptomatic and euvolemic at the time of evaluation.  She is a pleased with her clinical outcome.  She remained physically active she has good mental status.  We will continue lisinopril, metoprolol      #2 sick sinus syndrome  s/p pacemaker generator replacement pleased with her clinical outcome continue follow-up with pacer clinic      3 paroxysmal atrial fibrillation continue metoprolol         [id number] score is 4 continue oral anticoagulation to decrease risk of cardiac embolization        #4 nonrheumatic mitral regurgitation    Clinically there is no evidence of progression will follow with periodic echo as per recommendation        Preventive    Morbid obesity with a BMI of 36.33 with history of hypertension, diastolic dysfunction    Significant low-carb weight, low-fat, DASH diet and graded exercise discussed.    I spent 16 minutes caring for Pennie on this date of service. This time includes time spent by me of counseling/coordination of care as relates to the presenting problem and any ordered procedures/tests as outlined above.           This document has been electronically signed by Shiloh Medrano MD on August 3, 2022 10:06 CDT        Diagnoses and all orders for this visit:    1. Chronic diastolic congestive heart failure (HCC) (Primary)    2.  Nonrheumatic mitral valve regurgitation    3. Sick sinus syndrome (HCC)    4. Paroxysmal atrial fibrillation (HCC)          Shiloh Medrano MD  8/3/2022  09:56 CDT

## 2022-08-15 ENCOUNTER — OFFICE VISIT (OUTPATIENT)
Dept: PULMONOLOGY | Facility: CLINIC | Age: 77
End: 2022-08-15

## 2022-08-15 VITALS
WEIGHT: 186 LBS | DIASTOLIC BLOOD PRESSURE: 64 MMHG | HEIGHT: 60 IN | OXYGEN SATURATION: 95 % | BODY MASS INDEX: 36.52 KG/M2 | SYSTOLIC BLOOD PRESSURE: 108 MMHG | HEART RATE: 72 BPM

## 2022-08-15 DIAGNOSIS — J45.40 MODERATE PERSISTENT ASTHMA WITHOUT COMPLICATION: Primary | ICD-10-CM

## 2022-08-15 PROCEDURE — 99213 OFFICE O/P EST LOW 20 MIN: CPT | Performed by: INTERNAL MEDICINE

## 2022-08-15 NOTE — PROGRESS NOTES
"    Pulmonary Consultation    No ref. provider found,    Thank you for asking me to see Pennie Gibson for   Chief Complaint   Patient presents with   • Shortness of Breath   .      History of Present Illness  Pennie Gibson is a 77 y.o. female     8/15/22  Patient here for follow up. At last visit, I started her on symbicort for asthma. She is doing wel with it. Feels like her breathing is fine, but also tells me she can't remember what her breathing was like before she started the inhaler. She gets winded with physical activity but recovers quickly      5/13/22  Patient referred from [facility] for \"COPD\". No notes in the chart with further elaboration on this    Patient is here with her daughter. They say she was diagnosed with asthma years ago. She has a rescue inhaler but that's all, and doesn't use it because she doesn't like depending on it. Not clear if it really helps or not. Doesn't recall being on any controller meds. Feells like her breathing has gotten a little worse lately but not really sure over what time frame.    She moved from [place] about a year ago. Does notice her breathing is worse in the humidity    1 dog and 3 birds. Birds are new in the last year. Cockatiel, canary and parakeet        Tobacco use history:  Type: cigarettes  Amount: 1 ppd  Duration: 45 years  Cessation: 35 years ago (age 53)         Review of Systems: History obtained from chart review and the patient.  Review of Systems  As described in the HPI. Otherwise, remainder of ROS (14 systems) were negative.    Patient Active Problem List   Diagnosis   • Seasonal allergies   • Acute UTI   • Congestive heart failure (HCC)   • Nonrheumatic mitral valve regurgitation   • Nonrheumatic tricuspid valve regurgitation   • Sick sinus syndrome (HCC)   • Cardiac pacemaker in situ   • Atrial fibrillation (HCC)   • Gout   • Chronic kidney disease   • Moderate persistent asthma without complication   • Complications, mechanical, pacemaker, cardiac "         Current Outpatient Medications:   •  albuterol sulfate  (90 Base) MCG/ACT inhaler, Inhale 2 puffs Every 4 (Four) Hours As Needed for Wheezing., Disp: 18 g, Rfl: 6  •  allopurinol (ZYLOPRIM) 100 MG tablet, Take 100 mg by mouth Daily., Disp: , Rfl:   •  apixaban (ELIQUIS) 5 MG tablet tablet, Take 5 mg by mouth 2 (Two) Times a Day., Disp: , Rfl:   •  aspirin (aspirin) 81 MG EC tablet, Take 1 tablet by mouth Daily., Disp: 90 tablet, Rfl: 1  •  atorvastatin (LIPITOR) 20 MG tablet, Take 20 mg by mouth Daily., Disp: , Rfl:   •  budesonide-formoterol (SYMBICORT) 80-4.5 MCG/ACT inhaler, Inhale 2 puffs 2 (Two) Times a Day., Disp: 1 each, Rfl: 11  •  calcium carbonate (OS-MICHELL) 600 MG tablet, Take 600 mg by mouth Daily., Disp: , Rfl:   •  Cranberry 1000 MG capsule, Take 4,200 mg by mouth Daily., Disp: , Rfl:   •  Cyanocobalamin (VITAMIN B 12 PO), Take 1,000 mcg by mouth Daily., Disp: , Rfl:   •  Garlic 1000 MG capsule, Take 1,000 mg by mouth Daily., Disp: , Rfl:   •  metoprolol tartrate (LOPRESSOR) 100 MG tablet, Take 1 tablet by mouth twice daily, Disp: 180 tablet, Rfl: 0  •  vitamin C (ASCORBIC ACID) 250 MG tablet, Take 250 mg by mouth 2 (Two) Times a Day., Disp: , Rfl:   •  lisinopril (PRINIVIL,ZESTRIL) 20 MG tablet, Take 10 mg by mouth Daily., Disp: , Rfl:     Allergies   Allergen Reactions   • Penicillins Rash       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   • CHF (congestive heart failure) (HCC)    • COPD (chronic obstructive pulmonary disease) (HCC)    • High blood pressure    • Hyperlipidemia    • Pacemaker    • Stage 3 chronic kidney disease (HCC)    • Stroke (HCC)     TIA     Past Surgical History:   Procedure Laterality Date   • BLADDER SURGERY     • CARDIAC ELECTROPHYSIOLOGY PROCEDURE N/A 5/26/2022    Procedure: PPM generator change - dual;  Surgeon: Shiloh Medrano MD;  Location: [facility]ASIVE [facility];  Service: Cardiology;   "Laterality: N/A;   • CARDIAC SURGERY     • COLON RESECTION      x 2 small bowel   • HYSTERECTOMY     • PACEMAKER IMPLANTATION     • VAGINAL BIOPSY       Social History     Socioeconomic History   • Marital status:    Tobacco Use   • Smoking status: Former Smoker     Packs/day: 2.00     Years: 45.00     Pack years: 90.00     Start date: 3/12/1953     Quit date: 3/12/1993     Years since quittin.4   • Smokeless tobacco: Never Used   Vaping Use   • Vaping Use: Never used   Substance and Sexual Activity   • Alcohol use: Not Currently   • Drug use: Never   • Sexual activity: Defer     Family History   Problem Relation Age of Onset   • Cancer Mother    • Diabetes Father    • Diabetes Sister    • Diabetes Brother           Objective     Blood pressure 108/64, pulse 72, height 152.4 cm (60\"), weight 84.4 kg (186 lb), SpO2 95 %.  Physical Exam  Vitals reviewed.   Constitutional:       Appearance: Normal appearance.   HENT:      Head: Normocephalic and atraumatic.      Nose: Nose normal.      Mouth/Throat:      Mouth: Mucous membranes are moist.      Pharynx: Oropharynx is clear.   Eyes:      Conjunctiva/sclera: Conjunctivae normal.      Pupils: Pupils are equal, round, and reactive to light.   Cardiovascular:      Rate and Rhythm: Normal rate and regular rhythm.      Pulses: Normal pulses.      Heart sounds: Normal heart sounds.   Pulmonary:      Effort: Pulmonary effort is normal.      Breath sounds: Normal breath sounds.   Abdominal:      General: Abdomen is flat. Bowel sounds are normal.      Palpations: Abdomen is soft.   Musculoskeletal:         General: Normal range of motion.      Cervical back: Normal range of motion.   Skin:     General: Skin is warm and dry.   Neurological:      General: No focal deficit present.      Mental Status: She is alert and oriented to person, place, and time.   Psychiatric:         Mood and Affect: Mood normal.         Behavior: Behavior normal.         PFTs: (independently " reviewed and interpreted by me)  5/13/22  FVC 2.02L, 90%  FEV1 1.44L, 84%  Ratio 71%  TLC 4.56L, 102%  %  DLCO 64%  Normal spriometry, mildly elevated lung volume and evidence of air trapping, mild diffusion impairment    Radiology (independently reviewed and interpreted by me):   CXR 3/9/22- clear lung fields       Assessment & Plan     Diagnoses and all orders for this visit:    1. Moderate persistent asthma without complication (Primary)         Discussion/ Recommendations:   Patient seems to be doing ok with the Symbicort. Explained if she can't tell me how she's doing I can't really make recommendations on changes. She thinks she's doing ok so we will just continue Symbicort for now. If she worsens she should return sooner, otherwise can check in in 6 months           Return in about 6 months (around 2/15/2023) for f/u asthma.      Thank you for allowing me to participate in the care of Pennie Gibson. Please do not hesitate to contact me with any questions.         This document has been electronically signed by Giselle Corrigan DO on August 15, 2022 16:38 CDT

## 2022-09-14 ENCOUNTER — CLINICAL SUPPORT (OUTPATIENT)
Dept: CARDIOLOGY | Facility: CLINIC | Age: 77
End: 2022-09-14

## 2022-09-14 DIAGNOSIS — I49.5 SICK SINUS SYNDROME: Primary | ICD-10-CM

## 2022-09-14 PROCEDURE — 93288 INTERROG EVL PM/LDLS PM IP: CPT | Performed by: INTERNAL MEDICINE

## 2022-09-14 NOTE — PROGRESS NOTES
Pacemaker Evaluation Report    September 14, 2022    77 years old patient with history of paroxysmal atrial fibrillation sick sinus syndrome hypertension with hypertensive heart disease s/p pacemaker and presented the pacer clinic.  The pacemaker was interrogated  Primary Cardiologist: Dr. Medrano  Implanting MD: Dr. Medrano  :XbyMe Model: Accolade MRI L311  Serial Number: [id number]  Implant date: 5/26/22    Reason for evaluation:routine, office, PPM  Cardiac device indication(s): sick sinus syndrome    Battery  LULU: 8.5 years     Interrogation Results  Atrial sensing: 3.9 mV  Atrial capture: 0.5 V @ 0.4 ms   Atrial lead impedance: 483 ohms  Ventricular sensing: R wave: 18.5 mV  Ventricular capture: 0.7 V @ 0.4 ms   Ventricular lead impedance: right 472 ohms    Parameters  Mode: DDD  Base Rate: 60/130    Diagnostic Data  Atrial paced: 75 %   Ventricular paced: <1 %  Mode switch: <1%  AT/AF [place]: <1%  AHR: 1, AT 6 sec  VHR: 0    Intrinsic Rate: 31    Changes made: None    Conclusions: normal device function. Follow up in 6 months.    Assessment:    Sick sinus syndrome    2 paroxysmal atrial fibrillation  - eliquis                This document has been electronically signed by Marissa Hyde RN on September 14, 2022 10:06 CDT        This document has been electronically signed by Marissa Hyde RN on September 14, 2022 10:06 CDT

## 2022-09-24 ENCOUNTER — HOSPITAL ENCOUNTER (EMERGENCY)
Facility: HOSPITAL | Age: 77
Discharge: HOME | End: 2022-09-24
Attending: FAMILY MEDICINE | Admitting: FAMILY MEDICINE

## 2022-09-24 ENCOUNTER — APPOINTMENT (OUTPATIENT)
Dept: GENERAL RADIOLOGY | Facility: HOSPITAL | Age: 77
End: 2022-09-24

## 2022-09-24 VITALS
WEIGHT: 175 LBS | HEIGHT: 60 IN | SYSTOLIC BLOOD PRESSURE: 155 MMHG | HEART RATE: 60 BPM | RESPIRATION RATE: 18 BRPM | BODY MASS INDEX: 34.36 KG/M2 | DIASTOLIC BLOOD PRESSURE: 92 MMHG | TEMPERATURE: 98 F | OXYGEN SATURATION: 95 %

## 2022-09-24 DIAGNOSIS — J44.1 COPD EXACERBATION: Primary | ICD-10-CM

## 2022-09-24 LAB
ALBUMIN SERPL-MCNC: 3.9 G/DL (ref 3.5–5.2)
ALBUMIN/GLOB SERPL: 1.8 G/DL
ALP SERPL-CCNC: 85 U/L (ref 39–117)
ALT SERPL W P-5'-P-CCNC: 13 U/L (ref 1–33)
ANION GAP SERPL CALCULATED.3IONS-SCNC: 8 MMOL/L (ref 5–15)
AST SERPL-CCNC: 20 U/L (ref 1–32)
BASOPHILS # BLD AUTO: 0.08 10*3/MM3 (ref 0–0.2)
BASOPHILS NFR BLD AUTO: 0.7 % (ref 0–1.5)
BILIRUB SERPL-MCNC: 0.5 MG/DL (ref 0–1.2)
BUN SERPL-MCNC: 13 MG/DL (ref 8–23)
BUN/CREAT SERPL: 9.6 (ref 7–25)
CALCIUM SPEC-SCNC: 9.3 MG/DL (ref 8.6–10.5)
CHLORIDE SERPL-SCNC: 106 MMOL/L (ref 98–107)
CO2 SERPL-SCNC: 27 MMOL/L (ref 22–29)
CREAT SERPL-MCNC: 1.35 MG/DL (ref 0.57–1)
DEPRECATED RDW RBC AUTO: 45.1 FL (ref 37–54)
EGFRCR SERPLBLD CKD-EPI 2021: 40.1 ML/MIN/1.73
EOSINOPHIL # BLD AUTO: 0.96 10*3/MM3 (ref 0–0.4)
EOSINOPHIL NFR BLD AUTO: 8.4 % (ref 0.3–6.2)
ERYTHROCYTE [DISTWIDTH] IN BLOOD BY AUTOMATED COUNT: 13.6 % (ref 12.3–15.4)
FLUAV SUBTYP SPEC NAA+PROBE: NOT DETECTED
FLUBV RNA ISLT QL NAA+PROBE: NOT DETECTED
GLOBULIN UR ELPH-MCNC: 2.2 GM/DL
GLUCOSE SERPL-MCNC: 122 MG/DL (ref 65–99)
HCT VFR BLD AUTO: 39.6 % (ref 34–46.6)
HGB BLD-MCNC: 13.4 G/DL (ref 12–15.9)
HOLD SPECIMEN: NORMAL
IMM GRANULOCYTES # BLD AUTO: 0.05 10*3/MM3 (ref 0–0.05)
IMM GRANULOCYTES NFR BLD AUTO: 0.4 % (ref 0–0.5)
LYMPHOCYTES # BLD AUTO: 4.41 10*3/MM3 (ref 0.7–3.1)
LYMPHOCYTES NFR BLD AUTO: 38.5 % (ref 19.6–45.3)
MCH RBC QN AUTO: 30.6 PG (ref 26.6–33)
MCHC RBC AUTO-ENTMCNC: 33.8 G/DL (ref 31.5–35.7)
MCV RBC AUTO: 90.4 FL (ref 79–97)
MONOCYTES # BLD AUTO: 0.68 10*3/MM3 (ref 0.1–0.9)
MONOCYTES NFR BLD AUTO: 5.9 % (ref 5–12)
NEUTROPHILS NFR BLD AUTO: 46.1 % (ref 42.7–76)
NEUTROPHILS NFR BLD AUTO: 5.26 10*3/MM3 (ref 1.7–7)
NRBC BLD AUTO-RTO: 0 /100 WBC (ref 0–0.2)
NT-PROBNP SERPL-MCNC: 1006 PG/ML (ref 0–1800)
PLATELET # BLD AUTO: 266 10*3/MM3 (ref 140–450)
PMV BLD AUTO: 9.4 FL (ref 6–12)
POTASSIUM SERPL-SCNC: 4.4 MMOL/L (ref 3.5–5.2)
PROT SERPL-MCNC: 6.1 G/DL (ref 6–8.5)
QT INTERVAL: 436 MS
QTC INTERVAL: 436 MS
RBC # BLD AUTO: 4.38 10*6/MM3 (ref 3.77–5.28)
SARS-COV-2 RNA PNL SPEC NAA+PROBE: NOT DETECTED
SODIUM SERPL-SCNC: 141 MMOL/L (ref 136–145)
TROPONIN T SERPL-MCNC: <0.01 NG/ML (ref 0–0.03)
WBC NRBC COR # BLD: 11.44 10*3/MM3 (ref 3.4–10.8)
WHOLE BLOOD HOLD COAG: NORMAL
WHOLE BLOOD HOLD SPECIMEN: NORMAL

## 2022-09-24 PROCEDURE — 85025 COMPLETE CBC W/AUTO DIFF WBC: CPT

## 2022-09-24 PROCEDURE — 71045 X-RAY EXAM CHEST 1 VIEW: CPT

## 2022-09-24 PROCEDURE — 83880 ASSAY OF NATRIURETIC PEPTIDE: CPT | Performed by: FAMILY MEDICINE

## 2022-09-24 PROCEDURE — 93005 ELECTROCARDIOGRAM TRACING: CPT

## 2022-09-24 PROCEDURE — 84484 ASSAY OF TROPONIN QUANT: CPT | Performed by: FAMILY MEDICINE

## 2022-09-24 PROCEDURE — 80053 COMPREHEN METABOLIC PANEL: CPT | Performed by: FAMILY MEDICINE

## 2022-09-24 PROCEDURE — 99284 EMERGENCY DEPT VISIT MOD MDM: CPT

## 2022-09-24 PROCEDURE — 93005 ELECTROCARDIOGRAM TRACING: CPT | Performed by: FAMILY MEDICINE

## 2022-09-24 PROCEDURE — 93010 ELECTROCARDIOGRAM REPORT: CPT | Performed by: INTERNAL MEDICINE

## 2022-09-24 PROCEDURE — 87636 SARSCOV2 & INF A&B AMP PRB: CPT | Performed by: FAMILY MEDICINE

## 2022-09-24 RX ORDER — METHYLPREDNISOLONE 4 MG/1
TABLET ORAL
Qty: 21 TABLET | Refills: 0 | Status: SHIPPED | OUTPATIENT
Start: 2022-09-24 | End: 2022-10-07 | Stop reason: CLARIF

## 2022-09-24 RX ORDER — SODIUM CHLORIDE 0.9 % (FLUSH) 0.9 %
10 SYRINGE (ML) INJECTION AS NEEDED
Status: DISCONTINUED | OUTPATIENT
Start: 2022-09-24 | End: 2022-09-24 | Stop reason: HOSPADM

## 2022-09-24 RX ORDER — DOXYCYCLINE 100 MG/1
100 CAPSULE ORAL 2 TIMES DAILY
Qty: 20 CAPSULE | Refills: 0 | Status: SHIPPED | OUTPATIENT
Start: 2022-09-24 | End: 2022-10-07

## 2022-09-30 ENCOUNTER — APPOINTMENT (OUTPATIENT)
Dept: GENERAL RADIOLOGY | Facility: HOSPITAL | Age: 77
End: 2022-09-30

## 2022-09-30 ENCOUNTER — HOSPITAL ENCOUNTER (EMERGENCY)
Facility: HOSPITAL | Age: 77
Discharge: HOME | End: 2022-09-30
Attending: EMERGENCY MEDICINE | Admitting: EMERGENCY MEDICINE

## 2022-09-30 VITALS
WEIGHT: 175 LBS | TEMPERATURE: 97.8 F | OXYGEN SATURATION: 96 % | HEIGHT: 60 IN | SYSTOLIC BLOOD PRESSURE: 145 MMHG | HEART RATE: 83 BPM | DIASTOLIC BLOOD PRESSURE: 73 MMHG | RESPIRATION RATE: 18 BRPM | BODY MASS INDEX: 34.36 KG/M2

## 2022-09-30 DIAGNOSIS — R06.00 DYSPNEA, UNSPECIFIED TYPE: Primary | ICD-10-CM

## 2022-09-30 DIAGNOSIS — R05.9 COUGH: ICD-10-CM

## 2022-09-30 LAB
ALBUMIN SERPL-MCNC: 3.9 G/DL (ref 3.5–5.2)
ALBUMIN/GLOB SERPL: 1.7 G/DL
ALP SERPL-CCNC: 92 U/L (ref 39–117)
ALT SERPL W P-5'-P-CCNC: 16 U/L (ref 1–33)
ANION GAP SERPL CALCULATED.3IONS-SCNC: 10 MMOL/L (ref 5–15)
AST SERPL-CCNC: 19 U/L (ref 1–32)
BASOPHILS # BLD AUTO: 0.08 10*3/MM3 (ref 0–0.2)
BASOPHILS NFR BLD AUTO: 0.4 % (ref 0–1.5)
BILIRUB SERPL-MCNC: 0.3 MG/DL (ref 0–1.2)
BUN SERPL-MCNC: 34 MG/DL (ref 8–23)
BUN/CREAT SERPL: 22.7 (ref 7–25)
CALCIUM SPEC-SCNC: 9.8 MG/DL (ref 8.6–10.5)
CHLORIDE SERPL-SCNC: 104 MMOL/L (ref 98–107)
CO2 SERPL-SCNC: 26 MMOL/L (ref 22–29)
CREAT SERPL-MCNC: 1.5 MG/DL (ref 0.57–1)
DEPRECATED RDW RBC AUTO: 43.9 FL (ref 37–54)
EGFRCR SERPLBLD CKD-EPI 2021: 35.3 ML/MIN/1.73
EOSINOPHIL # BLD AUTO: 0.03 10*3/MM3 (ref 0–0.4)
EOSINOPHIL NFR BLD AUTO: 0.2 % (ref 0.3–6.2)
ERYTHROCYTE [DISTWIDTH] IN BLOOD BY AUTOMATED COUNT: 13.3 % (ref 12.3–15.4)
FLUAV SUBTYP SPEC NAA+PROBE: NOT DETECTED
FLUBV RNA ISLT QL NAA+PROBE: NOT DETECTED
GLOBULIN UR ELPH-MCNC: 2.3 GM/DL
GLUCOSE SERPL-MCNC: 125 MG/DL (ref 65–99)
HCT VFR BLD AUTO: 42.8 % (ref 34–46.6)
HGB BLD-MCNC: 14 G/DL (ref 12–15.9)
HOLD SPECIMEN: NORMAL
IMM GRANULOCYTES # BLD AUTO: 0.28 10*3/MM3 (ref 0–0.05)
IMM GRANULOCYTES NFR BLD AUTO: 1.5 % (ref 0–0.5)
LYMPHOCYTES # BLD AUTO: 5.35 10*3/MM3 (ref 0.7–3.1)
LYMPHOCYTES NFR BLD AUTO: 27.8 % (ref 19.6–45.3)
MAGNESIUM SERPL-MCNC: 2 MG/DL (ref 1.6–2.4)
MCH RBC QN AUTO: 29.5 PG (ref 26.6–33)
MCHC RBC AUTO-ENTMCNC: 32.7 G/DL (ref 31.5–35.7)
MCV RBC AUTO: 90.1 FL (ref 79–97)
MONOCYTES # BLD AUTO: 1.56 10*3/MM3 (ref 0.1–0.9)
MONOCYTES NFR BLD AUTO: 8.1 % (ref 5–12)
NEUTROPHILS NFR BLD AUTO: 11.92 10*3/MM3 (ref 1.7–7)
NEUTROPHILS NFR BLD AUTO: 62 % (ref 42.7–76)
NRBC BLD AUTO-RTO: 0 /100 WBC (ref 0–0.2)
NT-PROBNP SERPL-MCNC: 382.2 PG/ML (ref 0–1800)
PLATELET # BLD AUTO: 290 10*3/MM3 (ref 140–450)
PMV BLD AUTO: 9.5 FL (ref 6–12)
POTASSIUM SERPL-SCNC: 4.3 MMOL/L (ref 3.5–5.2)
PROT SERPL-MCNC: 6.2 G/DL (ref 6–8.5)
RBC # BLD AUTO: 4.75 10*6/MM3 (ref 3.77–5.28)
SARS-COV-2 RNA PNL SPEC NAA+PROBE: NOT DETECTED
SODIUM SERPL-SCNC: 140 MMOL/L (ref 136–145)
TROPONIN T SERPL-MCNC: <0.01 NG/ML (ref 0–0.03)
WBC NRBC COR # BLD: 19.22 10*3/MM3 (ref 3.4–10.8)
WHOLE BLOOD HOLD COAG: NORMAL

## 2022-09-30 PROCEDURE — 25010000002 DEXAMETHASONE PER 1 MG: Performed by: EMERGENCY MEDICINE

## 2022-09-30 PROCEDURE — 83880 ASSAY OF NATRIURETIC PEPTIDE: CPT | Performed by: EMERGENCY MEDICINE

## 2022-09-30 PROCEDURE — 93005 ELECTROCARDIOGRAM TRACING: CPT

## 2022-09-30 PROCEDURE — 93010 ELECTROCARDIOGRAM REPORT: CPT | Performed by: INTERNAL MEDICINE

## 2022-09-30 PROCEDURE — 94799 UNLISTED PULMONARY SVC/PX: CPT

## 2022-09-30 PROCEDURE — 93005 ELECTROCARDIOGRAM TRACING: CPT | Performed by: EMERGENCY MEDICINE

## 2022-09-30 PROCEDURE — 85025 COMPLETE CBC W/AUTO DIFF WBC: CPT | Performed by: EMERGENCY MEDICINE

## 2022-09-30 PROCEDURE — 87636 SARSCOV2 & INF A&B AMP PRB: CPT | Performed by: EMERGENCY MEDICINE

## 2022-09-30 PROCEDURE — 94640 AIRWAY INHALATION TREATMENT: CPT

## 2022-09-30 PROCEDURE — 99283 EMERGENCY DEPT VISIT LOW MDM: CPT

## 2022-09-30 PROCEDURE — 83735 ASSAY OF MAGNESIUM: CPT | Performed by: EMERGENCY MEDICINE

## 2022-09-30 PROCEDURE — 80053 COMPREHEN METABOLIC PANEL: CPT | Performed by: EMERGENCY MEDICINE

## 2022-09-30 PROCEDURE — 84484 ASSAY OF TROPONIN QUANT: CPT | Performed by: EMERGENCY MEDICINE

## 2022-09-30 PROCEDURE — 71045 X-RAY EXAM CHEST 1 VIEW: CPT

## 2022-09-30 PROCEDURE — 96374 THER/PROPH/DIAG INJ IV PUSH: CPT

## 2022-09-30 RX ORDER — GUAIFENESIN DEXTROMETHORPHAN HYDROBROMIDE ORAL SOLUTION 10; 100 MG/5ML; MG/5ML
5 SOLUTION ORAL EVERY 12 HOURS
Qty: 180 ML | Refills: 0 | Status: SHIPPED | OUTPATIENT
Start: 2022-09-30 | End: 2022-12-31

## 2022-09-30 RX ORDER — IPRATROPIUM BROMIDE AND ALBUTEROL SULFATE 2.5; .5 MG/3ML; MG/3ML
3 SOLUTION RESPIRATORY (INHALATION) ONCE
Status: COMPLETED | OUTPATIENT
Start: 2022-09-30 | End: 2022-09-30

## 2022-09-30 RX ORDER — DEXAMETHASONE SODIUM PHOSPHATE 4 MG/ML
10 INJECTION, SOLUTION INTRA-ARTICULAR; INTRALESIONAL; INTRAMUSCULAR; INTRAVENOUS; SOFT TISSUE ONCE
Status: COMPLETED | OUTPATIENT
Start: 2022-09-30 | End: 2022-09-30

## 2022-09-30 RX ADMIN — DEXAMETHASONE SODIUM PHOSPHATE 10 MG: 4 INJECTION, SOLUTION INTRAMUSCULAR; INTRAVENOUS at 20:37

## 2022-09-30 RX ADMIN — IPRATROPIUM BROMIDE AND ALBUTEROL SULFATE 3 ML: .5; 3 SOLUTION RESPIRATORY (INHALATION) at 20:46

## 2022-09-30 RX ADMIN — IPRATROPIUM BROMIDE AND ALBUTEROL SULFATE 3 ML: .5; 3 SOLUTION RESPIRATORY (INHALATION) at 20:11

## 2022-10-02 LAB
QT INTERVAL: 432 MS
QTC INTERVAL: 442 MS

## 2022-10-07 ENCOUNTER — OFFICE VISIT (OUTPATIENT)
Dept: PULMONOLOGY | Facility: CLINIC | Age: 77
End: 2022-10-07

## 2022-10-07 VITALS
BODY MASS INDEX: 36.77 KG/M2 | WEIGHT: 187.3 LBS | OXYGEN SATURATION: 98 % | HEIGHT: 60 IN | SYSTOLIC BLOOD PRESSURE: 140 MMHG | DIASTOLIC BLOOD PRESSURE: 72 MMHG | HEART RATE: 60 BPM

## 2022-10-07 DIAGNOSIS — J45.40 MODERATE PERSISTENT ASTHMA WITHOUT COMPLICATION: Primary | ICD-10-CM

## 2022-10-07 PROCEDURE — 99214 OFFICE O/P EST MOD 30 MIN: CPT | Performed by: NURSE PRACTITIONER

## 2022-10-07 RX ORDER — PREDNISONE 10 MG/1
TABLET ORAL
Qty: 31 TABLET | Refills: 0 | Status: SHIPPED | OUTPATIENT
Start: 2022-10-07 | End: 2022-10-31

## 2022-10-07 RX ORDER — BUDESONIDE AND FORMOTEROL FUMARATE DIHYDRATE 160; 4.5 UG/1; UG/1
2 AEROSOL RESPIRATORY (INHALATION)
Qty: 6 G | Refills: 12 | Status: SHIPPED | OUTPATIENT
Start: 2022-10-07 | End: 2022-10-10

## 2022-10-07 NOTE — PROGRESS NOTES
"    Pulmonary Office Visit    Thank you for asking me to see Pennie Gibson for   Chief Complaint   Patient presents with   • Asthma   • Shortness of Breath       History of Present Illness    10/7/22: Here sooner than scheduled appointment at request of patients daughter following two ER visits on 9/24 and 9/30. She received a Doxycycline and medrol on the 24th and cough medication on the 30th. She reached out to her PCP without response thus far, so she came here.   She is on Symbicort 80 and Albuterol HFA PRN. She has asthma history. She has been on these since 5/2022.   ER notes reveal and WBC increase from 11 to 19, that could be from Medrol pack but most likely from infectious process. Does not appear to be a lung source with no sputum production.     Of note, she does have valve disease and AF that could be contributing to her dyspnea on exertion. BNP negative.      She has fairly normal PFTs without obstruction at her initial visit. She does not have COPD. She may have a presentation of Asthma, but history was hard to obtain from her prior life in [place]. She said her and her sister always had asthma.    She only complains of a mild cough when taking deep breath. Denies any wheezing. Unable to take a deep breath.   When questioned further, she has only been using her inhaler every once and a while. She does not want to \"be dependent upon an inhaler to breathe\". I did my best to explain to her that if this is in fact an asthma exacerbation, that she will need inhalers and steroids to improve. Not taking her inhalers and then being seen again for dyspnea is a counterproductive visit. Her daughter agreed.   I asked her to demonstrate her inhaler use. She is taking 2 puffs at once versus one at a time. We went over proper technique.   I had her blow on a flow meter. She achieved 1.55, 1.77, 1.65L. She is not red zone.       Dr. Yao's last 2 visits: \"8/15/22  Patient here for follow up. At last visit, I " "started her on symbicort for asthma. She is doing well with it. Feels like her breathing is fine, but also tells me she can't remember what her breathing was like before she started the inhaler. She gets winded with physical activity but recovers quickly      22  Patient referred from [facility] for \"COPD\". No notes in the chart with further elaboration on this     Patient is here with her daughter. They say she was diagnosed with asthma years ago. She has a rescue inhaler but that's all, and doesn't use it because she doesn't like depending on it. Not clear if it really helps or not. Doesn't recall being on any controller meds. Feells like her breathing has gotten a little worse lately but not really sure over what time frame.     She moved from [place] about a year ago. Does notice her breathing is worse in the humidity     1 dog and 3 birds. Birds are new in the last year. Cockatiel, canary and parakeet\"        Trouble with inhaler use/spacer need:  No spacer needed. Taking 2 puffs at once versus one at a time.     ER visits/hospitalization/exacerbations at PCP/[facility]: 22 and 22    Tobacco use history:  Social History     Socioeconomic History   • Marital status:    Tobacco Use   • Smoking status: Former Smoker     Packs/day: 2.00     Years: 45.00     Pack years: 90.00     Start date: 3/12/1953     Quit date: 3/12/1993     Years since quittin.5   • Smokeless tobacco: Never Used   Vaping Use   • Vaping Use: Never used   Substance and Sexual Activity   • Alcohol use: Not Currently   • Drug use: Never   • Sexual activity: Defer       Review of Systems: History obtained from chart review and the patient.  Review of Systems   Constitutional: Negative for diaphoresis, fatigue, fever and unexpected weight change.   Respiratory: Negative for cough, chest tightness, shortness of breath and wheezing.    Cardiovascular: Negative for chest pain, palpitations and leg swelling.   Gastrointestinal: Negative for " abdominal distention and blood in stool.   Genitourinary: Negative for hematuria.   Musculoskeletal: Negative for arthralgias and myalgias.   Skin: Negative for pallor and rash.   Neurological: Negative for dizziness, syncope and weakness.   Hematological: Does not bruise/bleed easily.   Psychiatric/Behavioral: Negative for confusion. The patient is not nervous/anxious.        As described in the HPI. Otherwise, remainder of ROS (14 systems) were negative.    Patient Active Problem List   Diagnosis   • Seasonal allergies   • Acute UTI   • Congestive heart failure (HCC)   • Nonrheumatic mitral valve regurgitation   • Nonrheumatic tricuspid valve regurgitation   • Sick sinus syndrome (HCC)   • Cardiac pacemaker in situ   • Atrial fibrillation (HCC)   • Gout   • Chronic kidney disease   • Moderate persistent asthma without complication   • Complications, mechanical, pacemaker, cardiac         Current Outpatient Medications:   •  albuterol sulfate  (90 Base) MCG/ACT inhaler, Inhale 2 puffs Every 4 (Four) Hours As Needed for Wheezing., Disp: 18 g, Rfl: 6  •  allopurinol (ZYLOPRIM) 100 MG tablet, Take 100 mg by mouth Daily., Disp: , Rfl:   •  apixaban (ELIQUIS) 5 MG tablet tablet, Take 5 mg by mouth 2 (Two) Times a Day., Disp: , Rfl:   •  aspirin (aspirin) 81 MG EC tablet, Take 1 tablet by mouth Daily., Disp: 90 tablet, Rfl: 1  •  atorvastatin (LIPITOR) 20 MG tablet, Take 20 mg by mouth Daily., Disp: , Rfl:   •  calcium carbonate (OS-MICHELL) 600 MG tablet, Take 600 mg by mouth Daily., Disp: , Rfl:   •  Cranberry 1000 MG capsule, Take 4,200 mg by mouth Daily., Disp: , Rfl:   •  Cyanocobalamin (VITAMIN B 12 PO), Take 1,000 mcg by mouth Daily., Disp: , Rfl:   •  dextromethorphan-guaifenesin (ROBITUSSIN-DM)  MG/5ML liquid, Take 5 mL by mouth Every 12 (Twelve) Hours., Disp: 180 mL, Rfl: 0  •  Garlic 1000 MG capsule, Take 1,000 mg by mouth Daily., Disp: , Rfl:   •  metoprolol tartrate (LOPRESSOR) 100 MG tablet, Take 1  "tablet by mouth twice daily, Disp: 180 tablet, Rfl: 0  •  vitamin C (ASCORBIC ACID) 250 MG tablet, Take 250 mg by mouth 2 (Two) Times a Day., Disp: , Rfl:   •  budesonide-formoterol (Symbicort) 160-4.5 MCG/ACT inhaler, Inhale 2 puffs 2 (Two) Times a Day., Disp: 6 g, Rfl: 12  •  lisinopril (PRINIVIL,ZESTRIL) 20 MG tablet, Take 10 mg by mouth Daily., Disp: , Rfl:   •  predniSONE (DELTASONE) 10 MG tablet, Take 4 tabs daily x 3 days, then take 3 tabs daily x 3 days, then take 2 tabs daily x 3 days, then take 1 tab daily x 3 days, Disp: 31 tablet, Rfl: 0    Allergies   Allergen Reactions   • Penicillins Rash       Advance Care Planning   ACP discussion was declined by the patient. Patient does not have an advance directive, declines further assistance.          Objective     Vitals:    10/07/22 1023   BP: 140/72   Pulse: 60   SpO2: 98%   Weight: 85 kg (187 lb 4.8 oz)   Height: 152.4 cm (60\")       Physical Exam  Vitals and nursing note reviewed.   Constitutional:       General: She is not in acute distress.     Appearance: She is well-developed. She is not diaphoretic.   HENT:      Head: Normocephalic.   Eyes:      Conjunctiva/sclera: Conjunctivae normal.   Neck:      Vascular: No JVD.   Cardiovascular:      Rate and Rhythm: Normal rate and regular rhythm.      Heart sounds: Normal heart sounds, S1 normal and S2 normal. No murmur heard.    No friction rub. No gallop.   Pulmonary:      Effort: Pulmonary effort is normal. No respiratory distress.      Breath sounds: Normal breath sounds. No wheezing or rales.   Abdominal:      General: Bowel sounds are normal. There is no distension.      Palpations: Abdomen is soft.   Musculoskeletal:         General: Normal range of motion.   Skin:     General: Skin is warm and dry.      Findings: No erythema.   Neurological:      Mental Status: She is alert and oriented to person, place, and time.   Psychiatric:         Behavior: Behavior normal.         Thought Content: Thought content " normal.         Judgment: Judgment normal.         PFTs              Radiology (independently reviewed by me, interpreted by shayy)    XR Chest 1 View    Result Date: 9/30/2022  No acute cardiothoracic process. Electronically signed by:  Melvin Castanon MD  9/30/2022 9:38 PM CDT Workstation: [id number]    XR Chest 1 View    Result Date: 9/24/2022  No evidence of active disease. Electronically signed by:  Hilton Young MD  9/24/2022 1:08 PM CDT Workstation: [id number]         Assessment       Discussion/ Recommendations:    Diagnosis Plan   1. Moderate persistent asthma without complication  - Increase symbicort to 160mcg  - Prednisone taper for asthma exacerbation  - Allegra for rhinnorhea     - Albuterol as needed for dyspnea and cough.  - Return demonstration and education preformed for inhaler.   - Encouraged medication compliance including inhalers     - If no improvement with Prednisone, then dyspnea is not from Asthma. She will need to seek out visit with PCP for complaint.            BMI is >= 30 and <35. (Class 1 Obesity). The following options were offered after discussion;: exercise counseling/recommendations      Follow up: with PCP for acute complaints.       I spent 35 minutes caring for Pennie on this date of service. This time includes time spent by me in the following activities: preparing for the visit, reviewing tests, obtaining and/or reviewing a separately obtained history, performing a medically appropriate examination and/or evaluation, counseling and educating the patient/family/caregiver, ordering medications, tests, or procedures, referring and communicating with other health care professionals, documenting information in the medical record, independently interpreting results and communicating that information with the patient/family/caregiver and care coordination            This document has been electronically signed by DEN David on October 7, 2022 11:18 CDT

## 2022-10-10 RX ORDER — BUDESONIDE AND FORMOTEROL FUMARATE DIHYDRATE 160; 4.5 UG/1; UG/1
2 AEROSOL RESPIRATORY (INHALATION)
Qty: 6 G | Refills: 12 | Status: SHIPPED | OUTPATIENT
Start: 2022-10-10 | End: 2022-10-31

## 2022-10-31 ENCOUNTER — OFFICE VISIT (OUTPATIENT)
Dept: PULMONOLOGY | Facility: CLINIC | Age: 77
End: 2022-10-31

## 2022-10-31 VITALS
WEIGHT: 191.6 LBS | HEART RATE: 60 BPM | HEIGHT: 60 IN | DIASTOLIC BLOOD PRESSURE: 82 MMHG | SYSTOLIC BLOOD PRESSURE: 128 MMHG | OXYGEN SATURATION: 99 % | BODY MASS INDEX: 37.62 KG/M2

## 2022-10-31 DIAGNOSIS — J45.40 MODERATE PERSISTENT ASTHMA WITHOUT COMPLICATION: Primary | ICD-10-CM

## 2022-10-31 PROCEDURE — 99214 OFFICE O/P EST MOD 30 MIN: CPT | Performed by: NURSE PRACTITIONER

## 2022-10-31 RX ORDER — METOPROLOL TARTRATE 100 MG/1
TABLET ORAL
Qty: 180 TABLET | Refills: 0 | Status: SHIPPED | OUTPATIENT
Start: 2022-10-31 | End: 2022-11-14

## 2022-10-31 RX ORDER — DILTIAZEM HYDROCHLORIDE 60 MG/1
2 TABLET, FILM COATED ORAL
Qty: 6.9 G | Refills: 12 | Status: SHIPPED | OUTPATIENT
Start: 2022-10-31

## 2022-10-31 NOTE — PROGRESS NOTES
"    Pulmonary Office Visit    Thank you for asking me to see Pennie Arthur for   Chief Complaint   Patient presents with   • Asthma       History of Present Illness    10/31/22: Returns for 2 week follow up for asthma. She was not using her Symbicort correctly at our last visit. She is now using her symbicort twice a day with the help of Casie baig. She had breakfast at [employer] last week for which she was busy and active. She was winded but did not have to use albuterol. She would not have been able to do that a month ago. I had intended to increase her to 160mcg Symbicort, but she has so many 80mcg she wanted to stay with that dose. I corrected that script today.       10/7/22: Here sooner than scheduled appointment at request of patients daughter following two ER visits on 9/24 and 9/30. She received a Doxycycline and medrol on the 24th and cough medication on the 30th. She reached out to her PCP without response thus far, so she came here.   She is on Symbicort 80 and Albuterol HFA PRN. She has asthma history. She has been on these since 5/2022.   ER notes reveal and WBC increase from 11 to 19, that could be from Medrol pack but most likely from infectious process. Does not appear to be a lung source with no sputum production.     Of note, she does have valve disease and AF that could be contributing to her dyspnea on exertion. BNP negative.      She has fairly normal PFTs without obstruction at her initial visit. She does not have COPD. She may have a presentation of Asthma, but history was hard to obtain from her prior life in [place]. She said her and her sister always had asthma.    She only complains of a mild cough when taking deep breath. Denies any wheezing. Unable to take a deep breath.   When questioned further, she has only been using her inhaler every once and a while. She does not want to \"be dependent upon an inhaler to breathe\". I did my best to explain to her that if this is in fact an " "asthma exacerbation, that she will need inhalers and steroids to improve. Not taking her inhalers and then being seen again for dyspnea is a counterproductive visit. Her daughter agreed.   I asked her to demonstrate her inhaler use. She is taking 2 puffs at once versus one at a time. We went over proper technique.   I had her blow on a flow meter. She achieved 1.55, 1.77, 1.65L. She is not red zone.       Dr. Yao's last 2 visits: \"8/15/22  Patient here for follow up. At last visit, I started her on symbicort for asthma. She is doing well with it. Feels like her breathing is fine, but also tells me she can't remember what her breathing was like before she started the inhaler. She gets winded with physical activity but recovers quickly      22  Patient referred from [facility] for \"COPD\". No notes in the chart with further elaboration on this     Patient is here with her daughter. They say she was diagnosed with asthma years ago. She has a rescue inhaler but that's all, and doesn't use it because she doesn't like depending on it. Not clear if it really helps or not. Doesn't recall being on any controller meds. Feells like her breathing has gotten a little worse lately but not really sure over what time frame.     She moved from [place] about a year ago. Does notice her breathing is worse in the humidity     1 dog and 3 birds. Birds are new in the last year. Cockatiel, canary and parakeet\"        Trouble with inhaler use/spacer need:  No spacer needed. Taking 2 puffs at once versus one at a time.     ER visits/hospitalization/exacerbations at PCP/UC: 22 and 22    Tobacco use history:  Social History     Socioeconomic History   • Marital status:    Tobacco Use   • Smoking status: Former     Packs/day: 2.00     Years: 45.00     Pack years: 90.00     Types: Cigarettes     Start date: 3/12/1953     Quit date: 3/12/1993     Years since quittin.6   • Smokeless tobacco: Never   Vaping Use   • Vaping " Use: Never used   Substance and Sexual Activity   • Alcohol use: Not Currently   • Drug use: Never   • Sexual activity: Defer       Review of Systems: History obtained from chart review and the patient.  Review of Systems   Constitutional: Negative for diaphoresis, fatigue, fever and unexpected weight change.   Respiratory: Negative for cough, chest tightness, shortness of breath and wheezing.    Cardiovascular: Negative for chest pain, palpitations and leg swelling.   Gastrointestinal: Negative for abdominal distention and blood in stool.   Genitourinary: Negative for hematuria.   Musculoskeletal: Negative for arthralgias and myalgias.   Skin: Negative for pallor and rash.   Neurological: Negative for dizziness, syncope and weakness.   Hematological: Does not bruise/bleed easily.   Psychiatric/Behavioral: Negative for confusion. The patient is not nervous/anxious.        As described in the HPI. Otherwise, remainder of ROS (14 systems) were negative.    Patient Active Problem List   Diagnosis   • Seasonal allergies   • Acute UTI   • Congestive heart failure (HCC)   • Nonrheumatic mitral valve regurgitation   • Nonrheumatic tricuspid valve regurgitation   • Sick sinus syndrome (HCC)   • Cardiac pacemaker in situ   • Atrial fibrillation (HCC)   • Gout   • Chronic kidney disease   • Moderate persistent asthma without complication   • Complications, mechanical, pacemaker, cardiac         Current Outpatient Medications:   •  albuterol sulfate  (90 Base) MCG/ACT inhaler, Inhale 2 puffs Every 4 (Four) Hours As Needed for Wheezing., Disp: 18 g, Rfl: 6  •  allopurinol (ZYLOPRIM) 100 MG tablet, Take 100 mg by mouth Daily., Disp: , Rfl:   •  apixaban (ELIQUIS) 5 MG tablet tablet, Take 5 mg by mouth 2 (Two) Times a Day., Disp: , Rfl:   •  aspirin (aspirin) 81 MG EC tablet, Take 1 tablet by mouth Daily., Disp: 90 tablet, Rfl: 1  •  atorvastatin (LIPITOR) 20 MG tablet, Take 20 mg by mouth Daily., Disp: , Rfl:   •  calcium  "carbonate (OS-MICHELL) 600 MG tablet, Take 600 mg by mouth Daily., Disp: , Rfl:   •  Cranberry 1000 MG capsule, Take 4,200 mg by mouth Daily., Disp: , Rfl:   •  Cyanocobalamin (VITAMIN B 12 PO), Take 1,000 mcg by mouth Daily., Disp: , Rfl:   •  dextromethorphan-guaifenesin (ROBITUSSIN-DM)  MG/5ML liquid, Take 5 mL by mouth Every 12 (Twelve) Hours., Disp: 180 mL, Rfl: 0  •  Garlic 1000 MG capsule, Take 1,000 mg by mouth Daily., Disp: , Rfl:   •  metoprolol tartrate (LOPRESSOR) 100 MG tablet, Take 1 tablet by mouth twice daily, Disp: 180 tablet, Rfl: 0  •  Symbicort 160-4.5 MCG/ACT inhaler, Inhale 2 puffs 2 (Two) Times a Day., Disp: 6 g, Rfl: 12  •  vitamin C (ASCORBIC ACID) 250 MG tablet, Take 250 mg by mouth 2 (Two) Times a Day., Disp: , Rfl:   •  lisinopril (PRINIVIL,ZESTRIL) 20 MG tablet, Take 10 mg by mouth Daily., Disp: , Rfl:     Allergies   Allergen Reactions   • Penicillins Rash       Advance Care Planning   ACP discussion was declined by the patient. Patient does not have an advance directive, declines further assistance.          Objective     Vitals:    10/31/22 1017   BP: 128/82   Pulse: 60   SpO2: 99%   Weight: 86.9 kg (191 lb 9.6 oz)   Height: 152.4 cm (60\")       Physical Exam  Vitals and nursing note reviewed.   Constitutional:       General: She is not in acute distress.     Appearance: She is well-developed. She is not diaphoretic.   HENT:      Head: Normocephalic.   Eyes:      Conjunctiva/sclera: Conjunctivae normal.   Neck:      Vascular: No JVD.   Cardiovascular:      Rate and Rhythm: Normal rate and regular rhythm.      Heart sounds: Normal heart sounds, S1 normal and S2 normal. No murmur heard.    No friction rub. No gallop.   Pulmonary:      Effort: Pulmonary effort is normal. No respiratory distress.      Breath sounds: Normal breath sounds. No wheezing or rales.   Abdominal:      General: Bowel sounds are normal. There is no distension.      Palpations: Abdomen is soft.   Musculoskeletal:  "        General: Normal range of motion.   Skin:     General: Skin is warm and dry.      Findings: No erythema.   Neurological:      Mental Status: She is alert and oriented to person, place, and time.   Psychiatric:         Behavior: Behavior normal.         Thought Content: Thought content normal.         Judgment: Judgment normal.         PFTs              Radiology (independently reviewed by me, interpreted by [email or web address])    XR Chest 1 View    Result Date: 9/30/2022  No acute cardiothoracic process. Electronically signed by:  Melvin Castanon MD  9/30/2022 9:38 PM CDT Workstation: [id number]         Assessment       Discussion/ Recommendations:    Diagnosis Plan   1. Moderate persistent asthma without complication  - continue Symbicort 80mcg 2 puffs BID  - Allegra for rhinnorhea     - Albuterol as needed for dyspnea and cough.    - Return demonstration and education preformed for inhaler.   - Encouraged medication compliance including inhalers     - she responded well to Prednisone taper.        BMI is >= 30 and <35. (Class 1 Obesity). The following options were offered after discussion;: exercise counseling/recommendations      Follow up: 3 months Asthma.       I spent 35 minutes caring for Pennie on this date of service. This time includes time spent by me in the following activities: preparing for the visit, reviewing tests, obtaining and/or reviewing a separately obtained history, performing a medically appropriate examination and/or evaluation, counseling and educating the patient/family/caregiver, ordering medications, tests, or procedures, referring and communicating with other health care professionals, documenting information in the medical record, independently interpreting results and communicating that information with the patient/family/caregiver and care coordination            This document has been electronically signed by DEN David on October 31, 2022 11:20 CDT

## 2022-10-31 NOTE — TELEPHONE ENCOUNTER
Rx Refill Note  Requested Prescriptions     Pending Prescriptions Disp Refills   • metoprolol tartrate (LOPRESSOR) 100 MG tablet [Pharmacy Med Name: Metoprolol Tartrate 100 MG Oral Tablet] 180 tablet 0     Sig: Take 1 tablet by mouth twice daily      Last office visit with prescribing clinician: 7/14/2022      Next office visit with prescribing clinician: 1/16/2023            Vilma Merritt MA  10/31/22, 09:02 CDT

## 2022-11-14 RX ORDER — METOPROLOL TARTRATE 100 MG/1
TABLET ORAL
Qty: 180 TABLET | Refills: 0 | OUTPATIENT
Start: 2022-11-14

## 2022-11-14 RX ORDER — METOPROLOL TARTRATE 100 MG/1
TABLET ORAL
Qty: 180 TABLET | Refills: 0 | Status: SHIPPED | OUTPATIENT
Start: 2022-11-14

## 2022-11-14 RX ORDER — APIXABAN 5 MG/1
TABLET, FILM COATED ORAL
Qty: 180 TABLET | Refills: 0 | Status: SHIPPED | OUTPATIENT
Start: 2022-11-14 | End: 2023-03-30

## 2022-11-14 NOTE — TELEPHONE ENCOUNTER
Rx Refill Note  Requested Prescriptions     Pending Prescriptions Disp Refills   • metoprolol tartrate (LOPRESSOR) 100 MG tablet [Pharmacy Med Name: Metoprolol Tartrate 100 MG Oral Tablet] 180 tablet 0     Sig: Take 1 tablet by mouth twice daily   • Eliquis 5 MG tablet tablet [Pharmacy Med Name: Eliquis 5 MG Oral Tablet] 180 tablet 0     Sig: TAKE 1 TABLET BY MOUTH EVERY 12 HOURS      Last office visit with prescribing clinician: 7/14/2022      Next office visit with prescribing clinician: 11/12/2022            Vilma Merritt MA  11/14/22, 08:41 CST

## 2022-11-14 NOTE — TELEPHONE ENCOUNTER
Rx Refill Note  Requested Prescriptions     Pending Prescriptions Disp Refills   • metoprolol tartrate (LOPRESSOR) 100 MG tablet [Pharmacy Med Name: Metoprolol Tartrate 100 MG Oral Tablet] 180 tablet 0     Sig: Take 1 tablet by mouth twice daily      Last office visit with prescribing clinician: 7/14/2022      Next office visit with prescribing clinician: 1/16/2023            Vilma Merritt MA  11/14/22, 08:42 CST

## 2022-12-07 DIAGNOSIS — M1A.9XX0 CHRONIC GOUT WITHOUT TOPHUS, UNSPECIFIED CAUSE, UNSPECIFIED SITE: ICD-10-CM

## 2022-12-07 DIAGNOSIS — I50.21 ACUTE SYSTOLIC CHF (CONGESTIVE HEART FAILURE): Primary | ICD-10-CM

## 2022-12-07 RX ORDER — LISINOPRIL 20 MG/1
10 TABLET ORAL DAILY
Qty: 90 TABLET | Refills: 0 | Status: SHIPPED | OUTPATIENT
Start: 2022-12-07

## 2022-12-07 RX ORDER — ALLOPURINOL 100 MG/1
100 TABLET ORAL DAILY
Qty: 90 TABLET | Refills: 0 | Status: SHIPPED | OUTPATIENT
Start: 2022-12-07 | End: 2023-03-06

## 2022-12-31 ENCOUNTER — HOSPITAL ENCOUNTER (EMERGENCY)
Facility: HOSPITAL | Age: 77
Discharge: HOME | End: 2022-12-31
Attending: STUDENT IN AN ORGANIZED HEALTH CARE EDUCATION/TRAINING PROGRAM | Admitting: STUDENT IN AN ORGANIZED HEALTH CARE EDUCATION/TRAINING PROGRAM
Payer: MEDICARE

## 2022-12-31 ENCOUNTER — APPOINTMENT (OUTPATIENT)
Dept: GENERAL RADIOLOGY | Facility: HOSPITAL | Age: 77
End: 2022-12-31
Payer: MEDICARE

## 2022-12-31 VITALS
SYSTOLIC BLOOD PRESSURE: 130 MMHG | OXYGEN SATURATION: 97 % | BODY MASS INDEX: 36.71 KG/M2 | TEMPERATURE: 98.2 F | RESPIRATION RATE: 26 BRPM | DIASTOLIC BLOOD PRESSURE: 68 MMHG | HEART RATE: 60 BPM | WEIGHT: 187 LBS | HEIGHT: 60 IN

## 2022-12-31 DIAGNOSIS — J44.1 COPD EXACERBATION: ICD-10-CM

## 2022-12-31 DIAGNOSIS — J06.9 UPPER RESPIRATORY TRACT INFECTION, UNSPECIFIED TYPE: Primary | ICD-10-CM

## 2022-12-31 LAB
ALBUMIN SERPL-MCNC: 3.6 G/DL (ref 3.5–5.2)
ALBUMIN/GLOB SERPL: 1.3 G/DL
ALP SERPL-CCNC: 83 U/L (ref 39–117)
ALT SERPL W P-5'-P-CCNC: 13 U/L (ref 1–33)
ANION GAP SERPL CALCULATED.3IONS-SCNC: 11 MMOL/L (ref 5–15)
AST SERPL-CCNC: 25 U/L (ref 1–32)
BASOPHILS # BLD AUTO: 0.1 10*3/MM3 (ref 0–0.2)
BASOPHILS NFR BLD AUTO: 1.1 % (ref 0–1.5)
BILIRUB SERPL-MCNC: 0.6 MG/DL (ref 0–1.2)
BUN SERPL-MCNC: 18 MG/DL (ref 8–23)
BUN/CREAT SERPL: 12.5 (ref 7–25)
CALCIUM SPEC-SCNC: 9.6 MG/DL (ref 8.6–10.5)
CHLORIDE SERPL-SCNC: 106 MMOL/L (ref 98–107)
CO2 SERPL-SCNC: 23 MMOL/L (ref 22–29)
CREAT SERPL-MCNC: 1.44 MG/DL (ref 0.57–1)
DEPRECATED RDW RBC AUTO: 41.1 FL (ref 37–54)
EGFRCR SERPLBLD CKD-EPI 2021: 37.1 ML/MIN/1.73
EOSINOPHIL # BLD AUTO: 0.39 10*3/MM3 (ref 0–0.4)
EOSINOPHIL NFR BLD AUTO: 4.2 % (ref 0.3–6.2)
ERYTHROCYTE [DISTWIDTH] IN BLOOD BY AUTOMATED COUNT: 13.1 % (ref 12.3–15.4)
FLUAV RNA RESP QL NAA+PROBE: NOT DETECTED
FLUBV RNA RESP QL NAA+PROBE: NOT DETECTED
GLOBULIN UR ELPH-MCNC: 2.7 GM/DL
GLUCOSE SERPL-MCNC: 105 MG/DL (ref 65–99)
HCT VFR BLD AUTO: 40.8 % (ref 34–46.6)
HGB BLD-MCNC: 13.6 G/DL (ref 12–15.9)
IMM GRANULOCYTES # BLD AUTO: 0.02 10*3/MM3 (ref 0–0.05)
IMM GRANULOCYTES NFR BLD AUTO: 0.2 % (ref 0–0.5)
LYMPHOCYTES # BLD AUTO: 3.94 10*3/MM3 (ref 0.7–3.1)
LYMPHOCYTES NFR BLD AUTO: 42.7 % (ref 19.6–45.3)
MCH RBC QN AUTO: 29.4 PG (ref 26.6–33)
MCHC RBC AUTO-ENTMCNC: 33.3 G/DL (ref 31.5–35.7)
MCV RBC AUTO: 88.1 FL (ref 79–97)
MONOCYTES # BLD AUTO: 0.93 10*3/MM3 (ref 0.1–0.9)
MONOCYTES NFR BLD AUTO: 10.1 % (ref 5–12)
NEUTROPHILS NFR BLD AUTO: 3.85 10*3/MM3 (ref 1.7–7)
NEUTROPHILS NFR BLD AUTO: 41.7 % (ref 42.7–76)
NRBC BLD AUTO-RTO: 0 /100 WBC (ref 0–0.2)
NT-PROBNP SERPL-MCNC: 562.9 PG/ML (ref 0–1800)
PLATELET # BLD AUTO: 218 10*3/MM3 (ref 140–450)
PMV BLD AUTO: 9.2 FL (ref 6–12)
POTASSIUM SERPL-SCNC: 5.1 MMOL/L (ref 3.5–5.2)
PROT SERPL-MCNC: 6.3 G/DL (ref 6–8.5)
RBC # BLD AUTO: 4.63 10*6/MM3 (ref 3.77–5.28)
SARS-COV-2 RNA RESP QL NAA+PROBE: NOT DETECTED
SODIUM SERPL-SCNC: 140 MMOL/L (ref 136–145)
WBC NRBC COR # BLD: 9.23 10*3/MM3 (ref 3.4–10.8)

## 2022-12-31 PROCEDURE — 83880 ASSAY OF NATRIURETIC PEPTIDE: CPT | Performed by: STUDENT IN AN ORGANIZED HEALTH CARE EDUCATION/TRAINING PROGRAM

## 2022-12-31 PROCEDURE — 80053 COMPREHEN METABOLIC PANEL: CPT | Performed by: STUDENT IN AN ORGANIZED HEALTH CARE EDUCATION/TRAINING PROGRAM

## 2022-12-31 PROCEDURE — 87636 SARSCOV2 & INF A&B AMP PRB: CPT | Performed by: STUDENT IN AN ORGANIZED HEALTH CARE EDUCATION/TRAINING PROGRAM

## 2022-12-31 PROCEDURE — 63710000001 DEXAMETHASONE PER 0.25 MG: Performed by: STUDENT IN AN ORGANIZED HEALTH CARE EDUCATION/TRAINING PROGRAM

## 2022-12-31 PROCEDURE — 93005 ELECTROCARDIOGRAM TRACING: CPT | Performed by: STUDENT IN AN ORGANIZED HEALTH CARE EDUCATION/TRAINING PROGRAM

## 2022-12-31 PROCEDURE — 85025 COMPLETE CBC W/AUTO DIFF WBC: CPT | Performed by: STUDENT IN AN ORGANIZED HEALTH CARE EDUCATION/TRAINING PROGRAM

## 2022-12-31 PROCEDURE — 93005 ELECTROCARDIOGRAM TRACING: CPT

## 2022-12-31 PROCEDURE — 99283 EMERGENCY DEPT VISIT LOW MDM: CPT

## 2022-12-31 PROCEDURE — 71045 X-RAY EXAM CHEST 1 VIEW: CPT

## 2022-12-31 PROCEDURE — 93010 ELECTROCARDIOGRAM REPORT: CPT | Performed by: INTERNAL MEDICINE

## 2022-12-31 RX ORDER — PREDNISONE 20 MG/1
40 TABLET ORAL DAILY
Qty: 8 TABLET | Refills: 0 | Status: SHIPPED | OUTPATIENT
Start: 2022-12-31 | End: 2023-01-04

## 2022-12-31 RX ORDER — DEXAMETHASONE 4 MG/1
8 TABLET ORAL ONCE
Status: COMPLETED | OUTPATIENT
Start: 2022-12-31 | End: 2022-12-31

## 2022-12-31 RX ADMIN — DEXAMETHASONE 8 MG: 4 TABLET ORAL at 14:20

## 2022-12-31 NOTE — DISCHARGE INSTRUCTIONS
Take steroids starting tomorrow for the next 4 days.  I have given your first dose here today.  Increase your hydration.  Call your doctor on Tuesday for immediate follow-up.  Return to ED as needed.

## 2022-12-31 NOTE — ED PROVIDER NOTES
Subjective   History of Present Illness  Patient presents with cough and increasing shortness of breath starting on 1226.  She denies fever or chills but has had nasal congestion and cough has become productive over the last couple of days.  She has left ear congestion which has been popping as well.  She rates her chest pain is feeling scratchy yesterday felt tight and bandlike around her chest.  She currently denies chest pain but does have more pain in her chest with cough.        Review of Systems   Constitutional: Negative for activity change, appetite change, chills and fever.   HENT: Positive for congestion. Negative for ear pain.    Eyes: Negative for pain and discharge.   Respiratory: Positive for cough, chest tightness and shortness of breath.    Cardiovascular: Negative for chest pain and palpitations.   Gastrointestinal: Negative for abdominal distention and abdominal pain.   Endocrine: Negative for cold intolerance and heat intolerance.   Genitourinary: Negative for difficulty urinating and dysuria.   Musculoskeletal: Negative for arthralgias and back pain.   Skin: Negative for color change and rash.   Allergic/Immunologic: Negative for environmental allergies and food allergies.   Neurological: Negative for dizziness and headaches.   Hematological: Negative for adenopathy. Does not bruise/bleed easily.   Psychiatric/Behavioral: Negative for agitation and confusion.       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   • CHF (congestive heart failure) (HCC)    • COPD (chronic obstructive pulmonary disease) (HCC)    • High blood pressure    • Hyperlipidemia    • Pacemaker    • Stage 3 chronic kidney disease (HCC)    • Stroke (HCC)     TIA       Allergies   Allergen Reactions   • Penicillins Rash       Past Surgical History:   Procedure Laterality Date   • BLADDER SURGERY     • CARDIAC ELECTROPHYSIOLOGY PROCEDURE N/A 5/26/2022    Procedure:  PPM generator change - dual;  Surgeon: Shiloh Medrano MD;  Location: [facility] INVASIVE LOCATION;  Service: Cardiology;  Laterality: N/A;   • CARDIAC SURGERY     • COLON RESECTION      x 2 small bowel   • HYSTERECTOMY     • PACEMAKER IMPLANTATION     • VAGINAL BIOPSY         Family History   Problem Relation Age of Onset   • Cancer Mother    • Diabetes Father    • Diabetes Sister    • Diabetes Brother        Social History     Socioeconomic History   • Marital status:    Tobacco Use   • Smoking status: Former     Packs/day: 2.00     Years: 45.00     Pack years: 90.00     Types: Cigarettes     Start date: 3/12/1953     Quit date: 3/12/1993     Years since quittin.8   • Smokeless tobacco: Never   Vaping Use   • Vaping Use: Never used   Substance and Sexual Activity   • Alcohol use: Not Currently   • Drug use: Never   • Sexual activity: Defer           Objective   Physical Exam  Vitals and nursing note reviewed.   Constitutional:       Appearance: She is well-developed.   HENT:      Head: Normocephalic and atraumatic.   Eyes:      Pupils: Pupils are equal, round, and reactive to light.   Neck:      Thyroid: No thyromegaly.      Vascular: No JVD.      Trachea: No tracheal deviation.   Cardiovascular:      Rate and Rhythm: Normal rate.      Pulses:           Radial pulses are 2+ on the right side and 2+ on the left side.        Dorsalis pedis pulses are 2+ on the right side and 2+ on the left side.      Heart sounds: Normal heart sounds, S1 normal and S2 normal.   Pulmonary:      Effort: Pulmonary effort is normal.      Breath sounds: Decreased breath sounds (throughout) present.   Chest:      Chest wall: No tenderness.   Abdominal:      General: Bowel sounds are normal.   Musculoskeletal:         General: Normal range of motion.   Skin:     General: Skin is warm and dry.      Capillary Refill: Capillary refill takes 2 to 3 seconds.   Neurological:      Mental Status: She is alert and oriented to person,  place, and time.      GCS: GCS eye subscore is 4. GCS verbal subscore is 5. GCS motor subscore is 6.   Psychiatric:         Speech: Speech normal.         Behavior: Behavior normal.         Thought Content: Thought content normal.         Procedures           ED Course      Vitals:    12/31/22 1143   BP: 130/68   BP Location: Right arm   Patient Position: Sitting   Pulse: 60   Resp: 26   Temp: 98.2 °F (36.8 °C)   TempSrc: Oral   SpO2: 95%   Weight: 84.8 kg (187 lb)   Height: 152.4 cm (60\")     Lab Results (last 24 hours)     Procedure Component Value Units Date/Time    Comprehensive Metabolic Panel [[id number]]  (Abnormal) Collected: 12/31/22 1330    Specimen: Blood Updated: 12/31/22 1354     Glucose 105 mg/dL      BUN 18 mg/dL      Creatinine 1.44 mg/dL      Sodium 140 mmol/L      Potassium 5.1 mmol/L      Chloride 106 mmol/L      CO2 23.0 mmol/L      Calcium 9.6 mg/dL      Total Protein 6.3 g/dL      Albumin 3.6 g/dL      ALT (SGPT) 13 U/L      AST (SGOT) 25 U/L      Alkaline Phosphatase 83 U/L      Total Bilirubin 0.6 mg/dL      Globulin 2.7 gm/dL      A/G Ratio 1.3 g/dL      BUN/Creatinine Ratio 12.5     Anion Gap 11.0 mmol/L      eGFR 37.1 mL/min/1.73      Comment: National Kidney Foundation and American Society of Nephrology (ASN) Task Force recommended calculation based on the Chronic Kidney Disease Epidemiology Collaboration (CKD-EPI) equation refit without adjustment for race.       Narrative:      GFR Normal >60  Chronic Kidney Disease <60  Kidney Failure <15    The GFR formula is only valid for adults with stable renal function between ages 18 and 70.    BNP [[id number]]  (Normal) Collected: 12/31/22 1330    Specimen: Blood Updated: 12/31/22 1352     proBNP 562.9 pg/mL     Narrative:      Among patients with dyspnea, NT-proBNP is highly sensitive for the detection of acute congestive heart failure. In addition NT-proBNP of <300 pg/ml effectively rules out acute congestive heart failure with 99% negative  predictive value.      CBC & Differential [[id number]]  (Abnormal) Collected: 12/31/22 1330    Specimen: Blood Updated: 12/31/22 1334    Narrative:      The following orders were created for panel order CBC & Differential.  Procedure                               Abnormality         Status                     ---------                               -----------         ------                     CBC Auto Differential[[id number]]        Abnormal            Final result                 Please view results for these tests on the individual orders.    CBC Auto Differential [[id number]]  (Abnormal) Collected: 12/31/22 1330    Specimen: Blood Updated: 12/31/22 1334     WBC 9.23 10*3/mm3      RBC 4.63 10*6/mm3      Hemoglobin 13.6 g/dL      Hematocrit 40.8 %      MCV 88.1 fL      MCH 29.4 pg      MCHC 33.3 g/dL      RDW 13.1 %      RDW-SD 41.1 fl      MPV 9.2 fL      Platelets 218 10*3/mm3      Neutrophil % 41.7 %      Lymphocyte % 42.7 %      Monocyte % 10.1 %      Eosinophil % 4.2 %      Basophil % 1.1 %      Immature Grans % 0.2 %      Neutrophils, Absolute 3.85 10*3/mm3      Lymphocytes, Absolute 3.94 10*3/mm3      Monocytes, Absolute 0.93 10*3/mm3      Eosinophils, Absolute 0.39 10*3/mm3      Basophils, Absolute 0.10 10*3/mm3      Immature Grans, Absolute 0.02 10*3/mm3      nRBC 0.0 /100 WBC     COVID-19 and FLU A/B PCR - Swab, Nasopharynx [[id number]]  (Normal) Collected: 12/31/22 1211    Specimen: Swab from Nasopharynx Updated: 12/31/22 1238     COVID19 Not Detected     Influenza A PCR Not Detected     Influenza B PCR Not Detected    Narrative:      Fact sheet for providers: https://www.fda.gov/media/050008/download    Fact sheet for patients: https://www.fda.gov/media/968218/download    Test performed by PCR.        XR Chest 1 View    Result Date: 12/31/2022  There is no acute cardiopulmonary disease. Stable left pacemaker. Heart size is mildly prominent. Electronically signed by:  Anmol Jarrett MD  12/31/2022 1:23 PM CST  Workstation: [id number]                                         Medical Decision Making  Patient with upper respiratory infection symptoms with cough and shortness of breath.  Room air sats were greater than 93%.  Labs unremarkable.  Chest x-ray unremarkable.  Will give steroids for COPD exacerbation initiated in department and a prescription for additional doses.    COPD exacerbation (HCC): acute illness or injury  Upper respiratory tract infection, unspecified type: acute illness or injury  Amount and/or Complexity of Data Reviewed  Labs: ordered.  Radiology: ordered.  ECG/medicine tests: ordered.      Risk  Prescription drug management.          Final diagnoses:   Upper respiratory tract infection, unspecified type   COPD exacerbation (HCC)       ED Disposition  ED Disposition     ED Disposition   Discharge    Condition   Stable    Comment   --             Isaac Love MD  [address] [address]  [phone]    Call in 1 day  for follow up         Medication List      New Prescriptions    predniSONE 20 MG tablet  Commonly known as: DELTASONE  Take 2 tablets by mouth Daily for 4 days.           Where to Get Your Medications      These medications were sent to [place] [address] [facility] - [phone]  - [phone] [place] [id number]    Phone: [phone]   · predniSONE 20 MG tablet       This document has been electronically signed by Angelica Guardado MD on December 31, 2022 14:03 CST    Angelica Guardado MD   Part of this note may be an electronic transcription/translation of spoken language to printed text using the Dragon Dictation System.        Angelica Guardado MD  12/31/22 5701

## 2023-01-09 ENCOUNTER — OFFICE VISIT (OUTPATIENT)
Dept: FAMILY MEDICINE CLINIC | Facility: CLINIC | Age: 78
End: 2023-01-09
Payer: MEDICARE

## 2023-01-09 VITALS
WEIGHT: 192 LBS | OXYGEN SATURATION: 95 % | SYSTOLIC BLOOD PRESSURE: 110 MMHG | BODY MASS INDEX: 37.69 KG/M2 | TEMPERATURE: 97.7 F | DIASTOLIC BLOOD PRESSURE: 60 MMHG | HEART RATE: 74 BPM | HEIGHT: 60 IN

## 2023-01-09 DIAGNOSIS — I50.21 ACUTE SYSTOLIC CHF (CONGESTIVE HEART FAILURE): Primary | ICD-10-CM

## 2023-01-09 DIAGNOSIS — R06.02 SHORTNESS OF BREATH: ICD-10-CM

## 2023-01-09 DIAGNOSIS — J45.40 MODERATE PERSISTENT ASTHMA WITHOUT COMPLICATION: ICD-10-CM

## 2023-01-09 LAB
QT INTERVAL: 444 MS
QTC INTERVAL: 444 MS

## 2023-01-09 PROCEDURE — 99214 OFFICE O/P EST MOD 30 MIN: CPT | Performed by: STUDENT IN AN ORGANIZED HEALTH CARE EDUCATION/TRAINING PROGRAM

## 2023-01-09 RX ORDER — FUROSEMIDE 20 MG/1
20 TABLET ORAL 2 TIMES DAILY
Qty: 60 TABLET | Refills: 0 | Status: SHIPPED | OUTPATIENT
Start: 2023-01-09 | End: 2023-01-16

## 2023-01-09 RX ORDER — MONTELUKAST SODIUM 10 MG/1
10 TABLET ORAL NIGHTLY
Qty: 90 TABLET | Refills: 1 | Status: SHIPPED | OUTPATIENT
Start: 2023-01-09

## 2023-01-09 NOTE — PROGRESS NOTES
"Subjective:  Pennie Gibson is a 77 y.o. female who presents for ER follow-up.      Patient recently went to [facility] on 12/31/2020 due to shortness of breath.  Ultimately diagnosed with COPD exacerbation, treated with prednisone.  Labs significant for known CKD at baseline, no signs of infection, anemia, liver dysfunction, proBNP 562, COVID negative, flu negative EKG performed and reassuring, chest x-ray negative for acute cardiopulmonary process.  Patient states that since completing her course of steroids, has not noticed any improvement.  Denies any hemoptysis, fever, chills, productive cough, leg swelling, weight gain, shortness of breath lying flat. Recently saw pulmonology 10/31, who increased her Symbicort on and 160 mcg twice daily in addition to demonstrating correct use. States does have SALEH, chest tightness. Had similar episode back in September, had doxycycline and steroids then.     Patient Active Problem List   Diagnosis   • Seasonal allergies   • Acute UTI   • Congestive heart failure (HCC)   • Nonrheumatic mitral valve regurgitation   • Nonrheumatic tricuspid valve regurgitation   • Sick sinus syndrome (HCC)   • Cardiac pacemaker in situ   • Atrial fibrillation (HCC)   • Gout   • Chronic kidney disease   • Moderate persistent asthma without complication   • Complications, mechanical, pacemaker, cardiac     Vitals:    Vitals:    01/09/23 1329   BP: 110/60   BP Location: Right arm   Patient Position: Sitting   Cuff Size: Large Adult   Pulse: 74   Temp: 97.7 °F (36.5 °C)   SpO2: 95%   Weight: 87.1 kg (192 lb)   Height: 152.4 cm (60\")     Body mass index is 37.5 kg/m².      Current Outpatient Medications:   •  albuterol sulfate  (90 Base) MCG/ACT inhaler, Inhale 2 puffs Every 4 (Four) Hours As Needed for Wheezing., Disp: 18 g, Rfl: 6  •  allopurinol (ZYLOPRIM) 100 MG tablet, Take 1 tablet by mouth Daily., Disp: 90 tablet, Rfl: 0  •  aspirin (aspirin) 81 MG EC tablet, " Take 1 tablet by mouth Daily., Disp: 90 tablet, Rfl: 1  •  atorvastatin (LIPITOR) 20 MG tablet, Take 20 mg by mouth Daily., Disp: , Rfl:   •  calcium carbonate (OS-MICHELL) 600 MG tablet, Take 600 mg by mouth Daily., Disp: , Rfl:   •  Cranberry 1000 MG capsule, Take 4,200 mg by mouth Daily., Disp: , Rfl:   •  Cyanocobalamin (VITAMIN B 12 PO), Take 1,000 mcg by mouth Daily., Disp: , Rfl:   •  Eliquis 5 MG tablet tablet, TAKE 1 TABLET BY MOUTH EVERY 12 HOURS, Disp: 180 tablet, Rfl: 0  •  Garlic 1000 MG capsule, Take 1,000 mg by mouth Daily., Disp: , Rfl:   •  lisinopril (PRINIVIL,ZESTRIL) 20 MG tablet, Take 0.5 tablets by mouth Daily., Disp: 90 tablet, Rfl: 0  •  metoprolol tartrate (LOPRESSOR) 100 MG tablet, Take 1 tablet by mouth twice daily, Disp: 180 tablet, Rfl: 0  •  Symbicort 80-4.5 MCG/ACT inhaler, Inhale 2 puffs 2 (Two) Times a Day., Disp: 6.9 g, Rfl: 12  •  vitamin C (ASCORBIC ACID) 250 MG tablet, Take 250 mg by mouth 2 (Two) Times a Day., Disp: , Rfl:   •  furosemide (Lasix) 20 MG tablet, Take 1 tablet by mouth 2 (Two) Times a Day., Disp: 60 tablet, Rfl: 0  •  montelukast (Singulair) 10 MG tablet, Take 1 tablet by mouth Every Night., Disp: 90 tablet, Rfl: 1    Patient Active Problem List   Diagnosis   • Seasonal allergies   • Acute UTI   • Congestive heart failure (HCC)   • Nonrheumatic mitral valve regurgitation   • Nonrheumatic tricuspid valve regurgitation   • Sick sinus syndrome (HCC)   • Cardiac pacemaker in situ   • Atrial fibrillation (HCC)   • Gout   • Chronic kidney disease   • Moderate persistent asthma without complication   • Complications, mechanical, pacemaker, cardiac     Past Surgical History:   Procedure Laterality Date   • BLADDER SURGERY     • CARDIAC ELECTROPHYSIOLOGY PROCEDURE N/A 5/26/2022    Procedure: PPM generator change - dual;  Surgeon: Shiloh Medrano MD;  Location: [facility] INVASIVE LOCATION;  Service: Cardiology;  Laterality: N/A;   • CARDIAC SURGERY     • COLON RESECTION       x 2 small bowel   • HYSTERECTOMY     • PACEMAKER IMPLANTATION     • VAGINAL BIOPSY       Social History     Socioeconomic History   • Marital status:    Tobacco Use   • Smoking status: Former     Packs/day: 2.00     Years: 45.00     Pack years: 90.00     Types: Cigarettes     Start date: 3/12/1953     Quit date: 3/12/1993     Years since quittin.8   • Smokeless tobacco: Never   Vaping Use   • Vaping Use: Never used   Substance and Sexual Activity   • Alcohol use: Not Currently   • Drug use: Never   • Sexual activity: Defer     Family History   Problem Relation Age of Onset   • Cancer Mother    • Diabetes Father    • Diabetes Sister    • Diabetes Brother      Admission on 2022, Discharged on 2022   Component Date Value Ref Range Status   • QT Interval 2022 444  ms Final   • QTC Interval 2022 444  ms Final   • COVID19 2022 Not Detected  Not Detected - Ref. Range Final   • Influenza A PCR 2022 Not Detected  Not Detected Final   • Influenza B PCR 2022 Not Detected  Not Detected Final   • Glucose 2022 105 (H)  65 - 99 mg/dL Final   • BUN 2022 18  8 - 23 mg/dL Final   • Creatinine 2022 1.44 (H)  0.57 - 1.00 mg/dL Final   • Sodium 2022 140  136 - 145 mmol/L Final   • Potassium 2022 5.1  3.5 - 5.2 mmol/L Final   • Chloride 2022 106  98 - 107 mmol/L Final   • CO2 2022 23.0  22.0 - 29.0 mmol/L Final   • Calcium 2022 9.6  8.6 - 10.5 mg/dL Final   • Total Protein 2022 6.3  6.0 - 8.5 g/dL Final   • Albumin 2022 3.6  3.5 - 5.2 g/dL Final   • ALT (SGPT) 2022 13  1 - 33 U/L Final   • AST (SGOT) 2022 25  1 - 32 U/L Final   • Alkaline Phosphatase 2022 83  39 - 117 U/L Final   • Total Bilirubin 2022 0.6  0.0 - 1.2 mg/dL Final   • Globulin 2022 2.7  gm/dL Final   • A/G Ratio 2022 1.3  g/dL Final   • BUN/Creatinine Ratio 2022 12.5  7.0 - 25.0 Final   • Anion Gap 2022 11.0  5.0  - 15.0 mmol/L Final   • eGFR 12/31/2022 37.1 (L)  >60.0 mL/min/1.73 Final    National Kidney Foundation and American Society of Nephrology (ASN) Task Force recommended calculation based on the Chronic Kidney Disease Epidemiology Collaboration (CKD-EPI) equation refit without adjustment for race.   • proBNP 12/31/2022 562.9  0.0 - 1,800.0 pg/mL Final   • WBC 12/31/2022 9.23  3.40 - 10.80 10*3/mm3 Final   • RBC 12/31/2022 4.63  3.77 - 5.28 10*6/mm3 Final   • Hemoglobin 12/31/2022 13.6  12.0 - 15.9 g/dL Final   • Hematocrit 12/31/2022 40.8  34.0 - 46.6 % Final   • MCV 12/31/2022 88.1  79.0 - 97.0 fL Final   • MCH 12/31/2022 29.4  26.6 - 33.0 pg Final   • MCHC 12/31/2022 33.3  31.5 - 35.7 g/dL Final   • RDW 12/31/2022 13.1  12.3 - 15.4 % Final   • RDW-SD 12/31/2022 41.1  37.0 - 54.0 fl Final   • MPV 12/31/2022 9.2  6.0 - 12.0 fL Final   • Platelets 12/31/2022 218  140 - 450 10*3/mm3 Final   • Neutrophil % 12/31/2022 41.7 (L)  42.7 - 76.0 % Final   • Lymphocyte % 12/31/2022 42.7  19.6 - 45.3 % Final   • Monocyte % 12/31/2022 10.1  5.0 - 12.0 % Final   • Eosinophil % 12/31/2022 4.2  0.3 - 6.2 % Final   • Basophil % 12/31/2022 1.1  0.0 - 1.5 % Final   • Immature Grans % 12/31/2022 0.2  0.0 - 0.5 % Final   • Neutrophils, Absolute 12/31/2022 3.85  1.70 - 7.00 10*3/mm3 Final   • Lymphocytes, Absolute 12/31/2022 3.94 (H)  0.70 - 3.10 10*3/mm3 Final   • Monocytes, Absolute 12/31/2022 0.93 (H)  0.10 - 0.90 10*3/mm3 Final   • Eosinophils, Absolute 12/31/2022 0.39  0.00 - 0.40 10*3/mm3 Final   • Basophils, Absolute 12/31/2022 0.10  0.00 - 0.20 10*3/mm3 Final   • Immature Grans, Absolute 12/31/2022 0.02  0.00 - 0.05 10*3/mm3 Final   • nRBC 12/31/2022 0.0  0.0 - 0.2 /100 WBC Final   Admission on 09/30/2022, Discharged on 09/30/2022   Component Date Value Ref Range Status   • QT Interval 09/30/2022 432  ms Final   • QTC Interval 09/30/2022 442  ms Final   • COVID19 09/30/2022 Not Detected  Not Detected - Ref. Range Final   •  Influenza A PCR 09/30/2022 Not Detected  Not Detected Final   • Influenza B PCR 09/30/2022 Not Detected  Not Detected Final   • Glucose 09/30/2022 125 (H)  65 - 99 mg/dL Final   • BUN 09/30/2022 34 (H)  8 - 23 mg/dL Final   • Creatinine 09/30/2022 1.50 (H)  0.57 - 1.00 mg/dL Final   • Sodium 09/30/2022 140  136 - 145 mmol/L Final   • Potassium 09/30/2022 4.3  3.5 - 5.2 mmol/L Final    Slight hemolysis detected by analyzer. Results may be affected.   • Chloride 09/30/2022 104  98 - 107 mmol/L Final   • CO2 09/30/2022 26.0  22.0 - 29.0 mmol/L Final   • Calcium 09/30/2022 9.8  8.6 - 10.5 mg/dL Final   • Total Protein 09/30/2022 6.2  6.0 - 8.5 g/dL Final   • Albumin 09/30/2022 3.90  3.50 - 5.20 g/dL Final   • ALT (SGPT) 09/30/2022 16  1 - 33 U/L Final   • AST (SGOT) 09/30/2022 19  1 - 32 U/L Final   • Alkaline Phosphatase 09/30/2022 92  39 - 117 U/L Final   • Total Bilirubin 09/30/2022 0.3  0.0 - 1.2 mg/dL Final   • Globulin 09/30/2022 2.3  gm/dL Final   • A/G Ratio 09/30/2022 1.7  g/dL Final   • BUN/Creatinine Ratio 09/30/2022 22.7  7.0 - 25.0 Final   • Anion Gap 09/30/2022 10.0  5.0 - 15.0 mmol/L Final   • eGFR 09/30/2022 35.3 (L)  >60.0 mL/min/1.73 Final    National Kidney Foundation and American Society of Nephrology (ASN) Task Force recommended calculation based on the Chronic Kidney Disease Epidemiology Collaboration (CKD-EPI) equation refit without adjustment for race.   • proBNP 09/30/2022 382.2  0.0 - 1,800.0 pg/mL Final   • Troponin T 09/30/2022 <0.010  0.000 - 0.030 ng/mL Final   • Magnesium 09/30/2022 2.0  1.6 - 2.4 mg/dL Final   • WBC 09/30/2022 19.22 (H)  3.40 - 10.80 10*3/mm3 Final   • RBC 09/30/2022 4.75  3.77 - 5.28 10*6/mm3 Final   • Hemoglobin 09/30/2022 14.0  12.0 - 15.9 g/dL Final   • Hematocrit 09/30/2022 42.8  34.0 - 46.6 % Final   • MCV 09/30/2022 90.1  79.0 - 97.0 fL Final   • MCH 09/30/2022 29.5  26.6 - 33.0 pg Final   • MCHC 09/30/2022 32.7  31.5 - 35.7 g/dL Final   • RDW 09/30/2022 13.3  12.3  - 15.4 % Final   • RDW-SD 09/30/2022 43.9  37.0 - 54.0 fl Final   • MPV 09/30/2022 9.5  6.0 - 12.0 fL Final   • Platelets 09/30/2022 290  140 - 450 10*3/mm3 Final   • Neutrophil % 09/30/2022 62.0  42.7 - 76.0 % Final   • Lymphocyte % 09/30/2022 27.8  19.6 - 45.3 % Final   • Monocyte % 09/30/2022 8.1  5.0 - 12.0 % Final   • Eosinophil % 09/30/2022 0.2 (L)  0.3 - 6.2 % Final   • Basophil % 09/30/2022 0.4  0.0 - 1.5 % Final   • Immature Grans % 09/30/2022 1.5 (H)  0.0 - 0.5 % Final   • Neutrophils, Absolute 09/30/2022 11.92 (H)  1.70 - 7.00 10*3/mm3 Final   • Lymphocytes, Absolute 09/30/2022 5.35 (H)  0.70 - 3.10 10*3/mm3 Final   • Monocytes, Absolute 09/30/2022 1.56 (H)  0.10 - 0.90 10*3/mm3 Final   • Eosinophils, Absolute 09/30/2022 0.03  0.00 - 0.40 10*3/mm3 Final   • Basophils, Absolute 09/30/2022 0.08  0.00 - 0.20 10*3/mm3 Final   • Immature Grans, Absolute 09/30/2022 0.28 (H)  0.00 - 0.05 10*3/mm3 Final   • nRBC 09/30/2022 0.0  0.0 - 0.2 /100 WBC Final   • Extra Tube 09/30/2022 Hold for add-ons.   Final    Auto resulted.   • Extra Tube 09/30/2022 Hold for add-ons.   Final    Auto resulted   Admission on 09/24/2022, Discharged on 09/24/2022   Component Date Value Ref Range Status   • QT Interval 09/24/2022 436  ms Final   • QTC Interval 09/24/2022 436  ms Final   • Glucose 09/24/2022 122 (H)  65 - 99 mg/dL Final   • BUN 09/24/2022 13  8 - 23 mg/dL Final   • Creatinine 09/24/2022 1.35 (H)  0.57 - 1.00 mg/dL Final   • Sodium 09/24/2022 141  136 - 145 mmol/L Final   • Potassium 09/24/2022 4.4  3.5 - 5.2 mmol/L Final   • Chloride 09/24/2022 106  98 - 107 mmol/L Final   • CO2 09/24/2022 27.0  22.0 - 29.0 mmol/L Final   • Calcium 09/24/2022 9.3  8.6 - 10.5 mg/dL Final   • Total Protein 09/24/2022 6.1  6.0 - 8.5 g/dL Final   • Albumin 09/24/2022 3.90  3.50 - 5.20 g/dL Final   • ALT (SGPT) 09/24/2022 13  1 - 33 U/L Final   • AST (SGOT) 09/24/2022 20  1 - 32 U/L Final   • Alkaline Phosphatase 09/24/2022 85  39 - 117 U/L  Final   • Total Bilirubin 09/24/2022 0.5  0.0 - 1.2 mg/dL Final   • Globulin 09/24/2022 2.2  gm/dL Final   • A/G Ratio 09/24/2022 1.8  g/dL Final   • BUN/Creatinine Ratio 09/24/2022 9.6  7.0 - 25.0 Final   • Anion Gap 09/24/2022 8.0  5.0 - 15.0 mmol/L Final   • eGFR 09/24/2022 40.1 (L)  >60.0 mL/min/1.73 Final    National Kidney Foundation and American Society of Nephrology (ASN) Task Force recommended calculation based on the Chronic Kidney Disease Epidemiology Collaboration (CKD-EPI) equation refit without adjustment for race.   • proBNP 09/24/2022 1,006.0  0.0 - 1,800.0 pg/mL Final   • Troponin T 09/24/2022 <0.010  0.000 - 0.030 ng/mL Final   • Extra Tube 09/24/2022 Hold for add-ons.   Final    Auto resulted.   • Extra Tube 09/24/2022 hold for add-on   Final    Auto resulted   • Extra Tube 09/24/2022 Hold for add-ons.   Final    Auto resulted.   • Extra Tube 09/24/2022 Hold for add-ons.   Final    Auto resulted   • WBC 09/24/2022 11.44 (H)  3.40 - 10.80 10*3/mm3 Final   • RBC 09/24/2022 4.38  3.77 - 5.28 10*6/mm3 Final   • Hemoglobin 09/24/2022 13.4  12.0 - 15.9 g/dL Final   • Hematocrit 09/24/2022 39.6  34.0 - 46.6 % Final   • MCV 09/24/2022 90.4  79.0 - 97.0 fL Final   • MCH 09/24/2022 30.6  26.6 - 33.0 pg Final   • MCHC 09/24/2022 33.8  31.5 - 35.7 g/dL Final   • RDW 09/24/2022 13.6  12.3 - 15.4 % Final   • RDW-SD 09/24/2022 45.1  37.0 - 54.0 fl Final   • MPV 09/24/2022 9.4  6.0 - 12.0 fL Final   • Platelets 09/24/2022 266  140 - 450 10*3/mm3 Final   • Neutrophil % 09/24/2022 46.1  42.7 - 76.0 % Final   • Lymphocyte % 09/24/2022 38.5  19.6 - 45.3 % Final   • Monocyte % 09/24/2022 5.9  5.0 - 12.0 % Final   • Eosinophil % 09/24/2022 8.4 (H)  0.3 - 6.2 % Final   • Basophil % 09/24/2022 0.7  0.0 - 1.5 % Final   • Immature Grans % 09/24/2022 0.4  0.0 - 0.5 % Final   • Neutrophils, Absolute 09/24/2022 5.26  1.70 - 7.00 10*3/mm3 Final   • Lymphocytes, Absolute 09/24/2022 4.41 (H)  0.70 - 3.10 10*3/mm3 Final   •  Monocytes, Absolute 09/24/2022 0.68  0.10 - 0.90 10*3/mm3 Final   • Eosinophils, Absolute 09/24/2022 0.96 (H)  0.00 - 0.40 10*3/mm3 Final   • Basophils, Absolute 09/24/2022 0.08  0.00 - 0.20 10*3/mm3 Final   • Immature Grans, Absolute 09/24/2022 0.05  0.00 - 0.05 10*3/mm3 Final   • nRBC 09/24/2022 0.0  0.0 - 0.2 /100 WBC Final   • Extra Tube 09/24/2022 Hold for add-ons.   Final    Auto resulted.   • COVID19 09/24/2022 Not Detected  Not Detected - Ref. Range Final   • Influenza A PCR 09/24/2022 Not Detected  Not Detected Final   • Influenza B PCR 09/24/2022 Not Detected  Not Detected Final   Office Visit on 07/14/2022   Component Date Value Ref Range Status   • Glucose 07/14/2022 89  65 - 99 mg/dL Final   • BUN 07/14/2022 17  8 - 23 mg/dL Final   • Creatinine 07/14/2022 1.28 (H)  0.57 - 1.00 mg/dL Final   • Sodium 07/14/2022 140  136 - 145 mmol/L Final   • Potassium 07/14/2022 5.3 (H)  3.5 - 5.2 mmol/L Final   • Chloride 07/14/2022 109 (H)  98 - 107 mmol/L Final   • CO2 07/14/2022 22.1  22.0 - 29.0 mmol/L Final   • Calcium 07/14/2022 9.4  8.6 - 10.5 mg/dL Final   • Total Protein 07/14/2022 6.2  6.0 - 8.5 g/dL Final   • Albumin 07/14/2022 3.90  3.50 - 5.20 g/dL Final   • ALT (SGPT) 07/14/2022 15  1 - 33 U/L Final   • AST (SGOT) 07/14/2022 23  1 - 32 U/L Final   • Alkaline Phosphatase 07/14/2022 88  39 - 117 U/L Final   • Total Bilirubin 07/14/2022 0.4  0.0 - 1.2 mg/dL Final   • Globulin 07/14/2022 2.3  gm/dL Final   • A/G Ratio 07/14/2022 1.7  g/dL Final   • BUN/Creatinine Ratio 07/14/2022 13.3  7.0 - 25.0 Final   • Anion Gap 07/14/2022 8.9  5.0 - 15.0 mmol/L Final   • eGFR 07/14/2022 42.7 (L)  >60.0 mL/min/1.73 Final    National Kidney Foundation and American Society of Nephrology (ASN) Task Force recommended calculation based on the Chronic Kidney Disease Epidemiology Collaboration (CKD-EPI) equation refit without adjustment for race.   • Total Cholesterol 07/14/2022 84  0 - 200 mg/dL Final   • Triglycerides  07/14/2022 101  0 - 150 mg/dL Final   • HDL Cholesterol 07/14/2022 41  40 - 60 mg/dL Final   • LDL Cholesterol  07/14/2022 24  0 - 100 mg/dL Final   • VLDL Cholesterol 07/14/2022 19  5 - 40 mg/dL Final   • LDL/HDL Ratio 07/14/2022 0.56   Final   • 25 Hydroxy, Vitamin D 07/14/2022 34.1  30.0 - 100.0 ng/ml Final   • Uric Acid 07/14/2022 5.0  2.4 - 5.7 mg/dL Final   • WBC 07/14/2022 12.67 (H)  3.40 - 10.80 10*3/mm3 Final   • RBC 07/14/2022 4.38  3.77 - 5.28 10*6/mm3 Final   • Hemoglobin 07/14/2022 13.3  12.0 - 15.9 g/dL Final   • Hematocrit 07/14/2022 38.4  34.0 - 46.6 % Final   • MCV 07/14/2022 87.7  79.0 - 97.0 fL Final   • MCH 07/14/2022 30.4  26.6 - 33.0 pg Final   • MCHC 07/14/2022 34.6  31.5 - 35.7 g/dL Final   • RDW 07/14/2022 14.2  12.3 - 15.4 % Final   • RDW-SD 07/14/2022 44.2  37.0 - 54.0 fl Final   • MPV 07/14/2022 10.5  6.0 - 12.0 fL Final   • Platelets 07/14/2022 283  140 - 450 10*3/mm3 Final   • Neutrophil % 07/14/2022 53.9  42.7 - 76.0 % Final   • Lymphocyte % 07/14/2022 34.5  19.6 - 45.3 % Final   • Monocyte % 07/14/2022 6.9  5.0 - 12.0 % Final   • Eosinophil % 07/14/2022 3.5  0.3 - 6.2 % Final   • Basophil % 07/14/2022 0.9  0.0 - 1.5 % Final   • Immature Grans % 07/14/2022 0.3  0.0 - 0.5 % Final   • Neutrophils, Absolute 07/14/2022 6.84  1.70 - 7.00 10*3/mm3 Final   • Lymphocytes, Absolute 07/14/2022 4.37 (H)  0.70 - 3.10 10*3/mm3 Final   • Monocytes, Absolute 07/14/2022 0.87  0.10 - 0.90 10*3/mm3 Final   • Eosinophils, Absolute 07/14/2022 0.44 (H)  0.00 - 0.40 10*3/mm3 Final   • Basophils, Absolute 07/14/2022 0.11  0.00 - 0.20 10*3/mm3 Final   • Immature Grans, Absolute 07/14/2022 0.04  0.00 - 0.05 10*3/mm3 Final   • nRBC 07/14/2022 0.1  0.0 - 0.2 /100 WBC Final      CT Chest Without Contrast Diagnostic  Narrative: EXAM:  CT Chest Without Intravenous Contrast  CLINICAL HISTORY:  77 years old, Female; Respiratory illness,  nondiagnostic xray, R06.02 Shortness of breath;  TECHNIQUE:  Axial computed  tomography images of the chest without  intravenous contrast.  This CT exam was performed using one or  more of the following dose reduction techniques:  automated  exposure control, adjustment of the mA and/or kV according to  patient size, and/or use of iterative reconstruction technique.  COMPARISON:  No relevant prior studies available.    FINDINGS:    Lungs:  Mild atelectasis in the left upper lobe.    Pleural space:  No effusion or pneumothorax.    Heart:  No cardiomegaly.    Mediastinum: 2.0 x 1.0 x 2.3 cm mediastinal nodule or  subpleural mediastinal nodule in the left anterior mediastinal  fat.    Thyroid:  Indeterminate 1.3 x 1.0 cm partially calcified nodule  inferior to the right thyroid lobe near midline.     Bones/joints:  No acute findings.    Soft tissues:  No acute findings.    Lymph nodes:  No adenopathy.    Stomach and bowel:  Duodenal diverticulum partially visualized.    Tubes, lines and devices:  Pacemaker.  Impression:    Indeterminate 2.0 x 1.0 x 2.3 cm mediastinal nodule or  subpleural mediastinal nodule in the left anterior mediastinal  fat with adjacent mild atelectasis or scarring. Suggest  comparison to priors if available. Suggest follow-up and further  evaluation i.e consider PET scan.    Indeterminate 1.3 x 1.0 cm partially calcified nodule inferior  to the right thyroid lobe near midline. Parathyroid nodule not  excluded. Correlate clinically.    Electronically signed by:  Dario Perez MD  1/12/2023 6:15 PM  [facility] Workstation: [id number]    @[email or web address]@  Immunization History   Administered Date(s) Administered   • COVID-19 (MODERNA) 1st, 2nd, 3rd Dose Only 09/25/2021, 10/23/2021   • Fluzone High-Dose 65+yrs 12/20/2021   • Influenza, Unspecified 01/10/2018   • Pneumococcal Polysaccharide (PPSV23) 02/07/2022   • Pneumococcal, Unspecified 01/01/2013   • Tdap 12/20/2021     The following portions of the patient's history were reviewed and updated as appropriate: allergies, current  medications, past family history, past medical history, past social history, past surgical history and problem list.    PHQ-9 Total Score: 0           Physical Exam  Constitutional:       Appearance: Normal appearance.   HENT:      Head: Normocephalic and atraumatic.      Right Ear: External ear normal.      Left Ear: External ear normal.   Eyes:      General:         Right eye: No discharge.         Left eye: No discharge.      Conjunctiva/sclera: Conjunctivae normal.   Cardiovascular:      Rate and Rhythm: Normal rate and regular rhythm.      Pulses: Normal pulses.      Heart sounds: Normal heart sounds. No murmur heard.  Pulmonary:      Effort: Pulmonary effort is normal. Tachypnea present. No prolonged expiration or respiratory distress.      Breath sounds: Decreased breath sounds present.   Abdominal:      General: There is no distension.      Palpations: Abdomen is soft.      Tenderness: There is no abdominal tenderness.   Musculoskeletal:      Cervical back: Normal range of motion.      Right lower leg: No edema.      Left lower leg: No edema.   Lymphadenopathy:      Cervical: No cervical adenopathy.   Neurological:      Mental Status: She is alert. Mental status is at baseline.   Psychiatric:         Mood and Affect: Mood normal.         Behavior: Behavior normal.       Assessment & Plan    Diagnosis Plan   1. Acute systolic CHF (congestive heart failure) (HCC)  furosemide (Lasix) 20 MG tablet      2. Moderate persistent asthma without complication  montelukast (Singulair) 10 MG tablet      3. Shortness of breath  CT Chest Without Contrast         Orders Placed This Encounter   Procedures   • CT Chest Without Contrast     Standing Status:   Future     Number of Occurrences:   1     Standing Expiration Date:   1/9/2024     Shortness of breath; clear etiology, has had multiple exacerbations in the past several months, does have history of diastolic dysfunction, concern for untreated CHF.  Counseled patient on  importance of daily weights, tracking symptoms, given strict ER/return precautions.  Discussion, will treat with Lasix as above, obtain CT chest, obtain labs at follow-up in 1 week.  Treat/refer as indicated.  Additionally, hospital records reviewed and reassuring.          This document has been electronically signed by Isaac Love MD on January 16, 2023 17:11 CST    EMR Dragon/Transciption Disclaimer: Some of this note may be an electronic transcription/translation of spoken language to printed text.  The electronic translation of spoken language may permit erroneous, or at times, nonsensical words or phrases to be inadvertently transcribed. Although I have reviewed the note for such errors, some may still exist.

## 2023-01-12 ENCOUNTER — HOSPITAL ENCOUNTER (OUTPATIENT)
Dept: CT IMAGING | Facility: HOSPITAL | Age: 78
Discharge: HOME | End: 2023-01-12
Admitting: STUDENT IN AN ORGANIZED HEALTH CARE EDUCATION/TRAINING PROGRAM
Payer: MEDICARE

## 2023-01-12 DIAGNOSIS — R06.02 SHORTNESS OF BREATH: ICD-10-CM

## 2023-01-12 PROCEDURE — 71250 CT THORAX DX C-: CPT

## 2023-01-13 DIAGNOSIS — E04.1 THYROID NODULE: Primary | ICD-10-CM

## 2023-01-16 ENCOUNTER — LAB (OUTPATIENT)
Dept: LAB | Facility: HOSPITAL | Age: 78
End: 2023-01-16
Payer: MEDICARE

## 2023-01-16 ENCOUNTER — OFFICE VISIT (OUTPATIENT)
Dept: FAMILY MEDICINE CLINIC | Facility: CLINIC | Age: 78
End: 2023-01-16
Payer: MEDICARE

## 2023-01-16 VITALS
DIASTOLIC BLOOD PRESSURE: 68 MMHG | WEIGHT: 194.2 LBS | TEMPERATURE: 98.3 F | SYSTOLIC BLOOD PRESSURE: 124 MMHG | BODY MASS INDEX: 38.13 KG/M2 | OXYGEN SATURATION: 98 % | HEART RATE: 80 BPM | HEIGHT: 60 IN

## 2023-01-16 DIAGNOSIS — R06.02 SHORTNESS OF BREATH: Primary | ICD-10-CM

## 2023-01-16 DIAGNOSIS — E04.1 THYROID NODULE: ICD-10-CM

## 2023-01-16 DIAGNOSIS — J44.9 CHRONIC OBSTRUCTIVE PULMONARY DISEASE, UNSPECIFIED COPD TYPE: ICD-10-CM

## 2023-01-16 DIAGNOSIS — N18.32 STAGE 3B CHRONIC KIDNEY DISEASE: ICD-10-CM

## 2023-01-16 PROCEDURE — 84443 ASSAY THYROID STIM HORMONE: CPT

## 2023-01-16 PROCEDURE — 99214 OFFICE O/P EST MOD 30 MIN: CPT | Performed by: STUDENT IN AN ORGANIZED HEALTH CARE EDUCATION/TRAINING PROGRAM

## 2023-01-16 NOTE — PROGRESS NOTES
"Subjective:  Pennie Gibson is a 77 y.o. female who presents for SOA    SOA; at previous appointment was given Lasix due to presumed CHF exacerbation. Unfortunately symptoms have not improved. Symptoms tend to wax and wane. Denied any cardiac symptoms, palpitations, weakness, productive cough. Had a CT scan without acute changes, cardiomegaly, effusion. Did have some incidental findings including thyroid.  Denies any alleviating or aggravating factors, denies any fever, chills, nausea, vomiting, diarrhea.    Patient Active Problem List   Diagnosis   • Seasonal allergies   • Acute UTI   • Congestive heart failure (HCC)   • Nonrheumatic mitral valve regurgitation   • Nonrheumatic tricuspid valve regurgitation   • Sick sinus syndrome (HCC)   • Cardiac pacemaker in situ   • Atrial fibrillation (HCC)   • Gout   • Chronic kidney disease   • Moderate persistent asthma without complication   • Complications, mechanical, pacemaker, cardiac     Vitals:    Vitals:    01/16/23 1703   BP: 124/68   BP Location: Right arm   Patient Position: Sitting   Cuff Size: Large Adult   Pulse: 80   Temp: 98.3 °F (36.8 °C)   SpO2: 98%   Weight: 88.1 kg (194 lb 3.2 oz)   Height: 152.4 cm (60\")     Body mass index is 37.93 kg/m².      Current Outpatient Medications:   •  albuterol sulfate  (90 Base) MCG/ACT inhaler, Inhale 2 puffs Every 4 (Four) Hours As Needed for Wheezing., Disp: 18 g, Rfl: 6  •  allopurinol (ZYLOPRIM) 100 MG tablet, Take 1 tablet by mouth Daily., Disp: 90 tablet, Rfl: 0  •  aspirin (aspirin) 81 MG EC tablet, Take 1 tablet by mouth Daily., Disp: 90 tablet, Rfl: 1  •  atorvastatin (LIPITOR) 20 MG tablet, Take 20 mg by mouth Daily., Disp: , Rfl:   •  calcium carbonate (OS-MICHELL) 600 MG tablet, Take 600 mg by mouth Daily., Disp: , Rfl:   •  Cranberry 1000 MG capsule, Take 4,200 mg by mouth Daily., Disp: , Rfl:   •  Cyanocobalamin (VITAMIN B 12 PO), Take 1,000 mcg by mouth Daily., Disp: , Rfl:   •  Eliquis 5 MG tablet " tablet, TAKE 1 TABLET BY MOUTH EVERY 12 HOURS, Disp: 180 tablet, Rfl: 0  •  Garlic 1000 MG capsule, Take 1,000 mg by mouth Daily., Disp: , Rfl:   •  lisinopril (PRINIVIL,ZESTRIL) 20 MG tablet, Take 0.5 tablets by mouth Daily., Disp: 90 tablet, Rfl: 0  •  metoprolol tartrate (LOPRESSOR) 100 MG tablet, Take 1 tablet by mouth twice daily, Disp: 180 tablet, Rfl: 0  •  montelukast (Singulair) 10 MG tablet, Take 1 tablet by mouth Every Night., Disp: 90 tablet, Rfl: 1  •  Symbicort 80-4.5 MCG/ACT inhaler, Inhale 2 puffs 2 (Two) Times a Day., Disp: 6.9 g, Rfl: 12  •  vitamin C (ASCORBIC ACID) 250 MG tablet, Take 250 mg by mouth 2 (Two) Times a Day., Disp: , Rfl:     Patient Active Problem List   Diagnosis   • Seasonal allergies   • Acute UTI   • Congestive heart failure (HCC)   • Nonrheumatic mitral valve regurgitation   • Nonrheumatic tricuspid valve regurgitation   • Sick sinus syndrome (HCC)   • Cardiac pacemaker in situ   • Atrial fibrillation (HCC)   • Gout   • Chronic kidney disease   • Moderate persistent asthma without complication   • Complications, mechanical, pacemaker, cardiac     Past Surgical History:   Procedure Laterality Date   • BLADDER SURGERY     • CARDIAC ELECTROPHYSIOLOGY PROCEDURE N/A 2022    Procedure: PPM generator change - dual;  Surgeon: Shiloh Medrano MD;  Location: [facility] INVASIVE LOCATION;  Service: Cardiology;  Laterality: N/A;   • CARDIAC SURGERY     • COLON RESECTION      x 2 small bowel   • HYSTERECTOMY     • PACEMAKER IMPLANTATION     • VAGINAL BIOPSY       Social History     Socioeconomic History   • Marital status:    Tobacco Use   • Smoking status: Former     Packs/day: 2.00     Years: 45.00     Pack years: 90.00     Types: Cigarettes     Start date: 3/12/1953     Quit date: 3/12/1993     Years since quittin.8   • Smokeless tobacco: Never   Vaping Use   • Vaping Use: Never used   Substance and Sexual Activity   • Alcohol use: Not Currently   • Drug use: Never   •  Sexual activity: Defer     Family History   Problem Relation Age of Onset   • Cancer Mother    • Diabetes Father    • Diabetes Sister    • Diabetes Brother      Admission on 12/31/2022, Discharged on 12/31/2022   Component Date Value Ref Range Status   • QT Interval 12/31/2022 444  ms Final   • QTC Interval 12/31/2022 444  ms Final   • COVID19 12/31/2022 Not Detected  Not Detected - Ref. Range Final   • Influenza A PCR 12/31/2022 Not Detected  Not Detected Final   • Influenza B PCR 12/31/2022 Not Detected  Not Detected Final   • Glucose 12/31/2022 105 (H)  65 - 99 mg/dL Final   • BUN 12/31/2022 18  8 - 23 mg/dL Final   • Creatinine 12/31/2022 1.44 (H)  0.57 - 1.00 mg/dL Final   • Sodium 12/31/2022 140  136 - 145 mmol/L Final   • Potassium 12/31/2022 5.1  3.5 - 5.2 mmol/L Final   • Chloride 12/31/2022 106  98 - 107 mmol/L Final   • CO2 12/31/2022 23.0  22.0 - 29.0 mmol/L Final   • Calcium 12/31/2022 9.6  8.6 - 10.5 mg/dL Final   • Total Protein 12/31/2022 6.3  6.0 - 8.5 g/dL Final   • Albumin 12/31/2022 3.6  3.5 - 5.2 g/dL Final   • ALT (SGPT) 12/31/2022 13  1 - 33 U/L Final   • AST (SGOT) 12/31/2022 25  1 - 32 U/L Final   • Alkaline Phosphatase 12/31/2022 83  39 - 117 U/L Final   • Total Bilirubin 12/31/2022 0.6  0.0 - 1.2 mg/dL Final   • Globulin 12/31/2022 2.7  gm/dL Final   • A/G Ratio 12/31/2022 1.3  g/dL Final   • BUN/Creatinine Ratio 12/31/2022 12.5  7.0 - 25.0 Final   • Anion Gap 12/31/2022 11.0  5.0 - 15.0 mmol/L Final   • eGFR 12/31/2022 37.1 (L)  >60.0 mL/min/1.73 Final    National Kidney Foundation and American Society of Nephrology (ASN) Task Force recommended calculation based on the Chronic Kidney Disease Epidemiology Collaboration (CKD-EPI) equation refit without adjustment for race.   • proBNP 12/31/2022 562.9  0.0 - 1,800.0 pg/mL Final   • WBC 12/31/2022 9.23  3.40 - 10.80 10*3/mm3 Final   • RBC 12/31/2022 4.63  3.77 - 5.28 10*6/mm3 Final   • Hemoglobin 12/31/2022 13.6  12.0 - 15.9 g/dL Final   •  Hematocrit 12/31/2022 40.8  34.0 - 46.6 % Final   • MCV 12/31/2022 88.1  79.0 - 97.0 fL Final   • MCH 12/31/2022 29.4  26.6 - 33.0 pg Final   • MCHC 12/31/2022 33.3  31.5 - 35.7 g/dL Final   • RDW 12/31/2022 13.1  12.3 - 15.4 % Final   • RDW-SD 12/31/2022 41.1  37.0 - 54.0 fl Final   • MPV 12/31/2022 9.2  6.0 - 12.0 fL Final   • Platelets 12/31/2022 218  140 - 450 10*3/mm3 Final   • Neutrophil % 12/31/2022 41.7 (L)  42.7 - 76.0 % Final   • Lymphocyte % 12/31/2022 42.7  19.6 - 45.3 % Final   • Monocyte % 12/31/2022 10.1  5.0 - 12.0 % Final   • Eosinophil % 12/31/2022 4.2  0.3 - 6.2 % Final   • Basophil % 12/31/2022 1.1  0.0 - 1.5 % Final   • Immature Grans % 12/31/2022 0.2  0.0 - 0.5 % Final   • Neutrophils, Absolute 12/31/2022 3.85  1.70 - 7.00 10*3/mm3 Final   • Lymphocytes, Absolute 12/31/2022 3.94 (H)  0.70 - 3.10 10*3/mm3 Final   • Monocytes, Absolute 12/31/2022 0.93 (H)  0.10 - 0.90 10*3/mm3 Final   • Eosinophils, Absolute 12/31/2022 0.39  0.00 - 0.40 10*3/mm3 Final   • Basophils, Absolute 12/31/2022 0.10  0.00 - 0.20 10*3/mm3 Final   • Immature Grans, Absolute 12/31/2022 0.02  0.00 - 0.05 10*3/mm3 Final   • nRBC 12/31/2022 0.0  0.0 - 0.2 /100 WBC Final   Admission on 09/30/2022, Discharged on 09/30/2022   Component Date Value Ref Range Status   • QT Interval 09/30/2022 432  ms Final   • QTC Interval 09/30/2022 442  ms Final   • COVID19 09/30/2022 Not Detected  Not Detected - Ref. Range Final   • Influenza A PCR 09/30/2022 Not Detected  Not Detected Final   • Influenza B PCR 09/30/2022 Not Detected  Not Detected Final   • Glucose 09/30/2022 125 (H)  65 - 99 mg/dL Final   • BUN 09/30/2022 34 (H)  8 - 23 mg/dL Final   • Creatinine 09/30/2022 1.50 (H)  0.57 - 1.00 mg/dL Final   • Sodium 09/30/2022 140  136 - 145 mmol/L Final   • Potassium 09/30/2022 4.3  3.5 - 5.2 mmol/L Final    Slight hemolysis detected by analyzer. Results may be affected.   • Chloride 09/30/2022 104  98 - 107 mmol/L Final   • CO2 09/30/2022  26.0  22.0 - 29.0 mmol/L Final   • Calcium 09/30/2022 9.8  8.6 - 10.5 mg/dL Final   • Total Protein 09/30/2022 6.2  6.0 - 8.5 g/dL Final   • Albumin 09/30/2022 3.90  3.50 - 5.20 g/dL Final   • ALT (SGPT) 09/30/2022 16  1 - 33 U/L Final   • AST (SGOT) 09/30/2022 19  1 - 32 U/L Final   • Alkaline Phosphatase 09/30/2022 92  39 - 117 U/L Final   • Total Bilirubin 09/30/2022 0.3  0.0 - 1.2 mg/dL Final   • Globulin 09/30/2022 2.3  gm/dL Final   • A/G Ratio 09/30/2022 1.7  g/dL Final   • BUN/Creatinine Ratio 09/30/2022 22.7  7.0 - 25.0 Final   • Anion Gap 09/30/2022 10.0  5.0 - 15.0 mmol/L Final   • eGFR 09/30/2022 35.3 (L)  >60.0 mL/min/1.73 Final    National Kidney Foundation and American Society of Nephrology (ASN) Task Force recommended calculation based on the Chronic Kidney Disease Epidemiology Collaboration (CKD-EPI) equation refit without adjustment for race.   • proBNP 09/30/2022 382.2  0.0 - 1,800.0 pg/mL Final   • Troponin T 09/30/2022 <0.010  0.000 - 0.030 ng/mL Final   • Magnesium 09/30/2022 2.0  1.6 - 2.4 mg/dL Final   • WBC 09/30/2022 19.22 (H)  3.40 - 10.80 10*3/mm3 Final   • RBC 09/30/2022 4.75  3.77 - 5.28 10*6/mm3 Final   • Hemoglobin 09/30/2022 14.0  12.0 - 15.9 g/dL Final   • Hematocrit 09/30/2022 42.8  34.0 - 46.6 % Final   • MCV 09/30/2022 90.1  79.0 - 97.0 fL Final   • MCH 09/30/2022 29.5  26.6 - 33.0 pg Final   • MCHC 09/30/2022 32.7  31.5 - 35.7 g/dL Final   • RDW 09/30/2022 13.3  12.3 - 15.4 % Final   • RDW-SD 09/30/2022 43.9  37.0 - 54.0 fl Final   • MPV 09/30/2022 9.5  6.0 - 12.0 fL Final   • Platelets 09/30/2022 290  140 - 450 10*3/mm3 Final   • Neutrophil % 09/30/2022 62.0  42.7 - 76.0 % Final   • Lymphocyte % 09/30/2022 27.8  19.6 - 45.3 % Final   • Monocyte % 09/30/2022 8.1  5.0 - 12.0 % Final   • Eosinophil % 09/30/2022 0.2 (L)  0.3 - 6.2 % Final   • Basophil % 09/30/2022 0.4  0.0 - 1.5 % Final   • Immature Grans % 09/30/2022 1.5 (H)  0.0 - 0.5 % Final   • Neutrophils, Absolute 09/30/2022  11.92 (H)  1.70 - 7.00 10*3/mm3 Final   • Lymphocytes, Absolute 09/30/2022 5.35 (H)  0.70 - 3.10 10*3/mm3 Final   • Monocytes, Absolute 09/30/2022 1.56 (H)  0.10 - 0.90 10*3/mm3 Final   • Eosinophils, Absolute 09/30/2022 0.03  0.00 - 0.40 10*3/mm3 Final   • Basophils, Absolute 09/30/2022 0.08  0.00 - 0.20 10*3/mm3 Final   • Immature Grans, Absolute 09/30/2022 0.28 (H)  0.00 - 0.05 10*3/mm3 Final   • nRBC 09/30/2022 0.0  0.0 - 0.2 /100 WBC Final   • Extra Tube 09/30/2022 Hold for add-ons.   Final    Auto resulted.   • Extra Tube 09/30/2022 Hold for add-ons.   Final    Auto resulted   Admission on 09/24/2022, Discharged on 09/24/2022   Component Date Value Ref Range Status   • QT Interval 09/24/2022 436  ms Final   • QTC Interval 09/24/2022 436  ms Final   • Glucose 09/24/2022 122 (H)  65 - 99 mg/dL Final   • BUN 09/24/2022 13  8 - 23 mg/dL Final   • Creatinine 09/24/2022 1.35 (H)  0.57 - 1.00 mg/dL Final   • Sodium 09/24/2022 141  136 - 145 mmol/L Final   • Potassium 09/24/2022 4.4  3.5 - 5.2 mmol/L Final   • Chloride 09/24/2022 106  98 - 107 mmol/L Final   • CO2 09/24/2022 27.0  22.0 - 29.0 mmol/L Final   • Calcium 09/24/2022 9.3  8.6 - 10.5 mg/dL Final   • Total Protein 09/24/2022 6.1  6.0 - 8.5 g/dL Final   • Albumin 09/24/2022 3.90  3.50 - 5.20 g/dL Final   • ALT (SGPT) 09/24/2022 13  1 - 33 U/L Final   • AST (SGOT) 09/24/2022 20  1 - 32 U/L Final   • Alkaline Phosphatase 09/24/2022 85  39 - 117 U/L Final   • Total Bilirubin 09/24/2022 0.5  0.0 - 1.2 mg/dL Final   • Globulin 09/24/2022 2.2  gm/dL Final   • A/G Ratio 09/24/2022 1.8  g/dL Final   • BUN/Creatinine Ratio 09/24/2022 9.6  7.0 - 25.0 Final   • Anion Gap 09/24/2022 8.0  5.0 - 15.0 mmol/L Final   • eGFR 09/24/2022 40.1 (L)  >60.0 mL/min/1.73 Final    National Kidney Foundation and American Society of Nephrology (ASN) Task Force recommended calculation based on the Chronic Kidney Disease Epidemiology Collaboration (CKD-EPI) equation refit without  adjustment for race.   • proBNP 09/24/2022 1,006.0  0.0 - 1,800.0 pg/mL Final   • Troponin T 09/24/2022 <0.010  0.000 - 0.030 ng/mL Final   • Extra Tube 09/24/2022 Hold for add-ons.   Final    Auto resulted.   • Extra Tube 09/24/2022 hold for add-on   Final    Auto resulted   • Extra Tube 09/24/2022 Hold for add-ons.   Final    Auto resulted.   • Extra Tube 09/24/2022 Hold for add-ons.   Final    Auto resulted   • WBC 09/24/2022 11.44 (H)  3.40 - 10.80 10*3/mm3 Final   • RBC 09/24/2022 4.38  3.77 - 5.28 10*6/mm3 Final   • Hemoglobin 09/24/2022 13.4  12.0 - 15.9 g/dL Final   • Hematocrit 09/24/2022 39.6  34.0 - 46.6 % Final   • MCV 09/24/2022 90.4  79.0 - 97.0 fL Final   • MCH 09/24/2022 30.6  26.6 - 33.0 pg Final   • MCHC 09/24/2022 33.8  31.5 - 35.7 g/dL Final   • RDW 09/24/2022 13.6  12.3 - 15.4 % Final   • RDW-SD 09/24/2022 45.1  37.0 - 54.0 fl Final   • MPV 09/24/2022 9.4  6.0 - 12.0 fL Final   • Platelets 09/24/2022 266  140 - 450 10*3/mm3 Final   • Neutrophil % 09/24/2022 46.1  42.7 - 76.0 % Final   • Lymphocyte % 09/24/2022 38.5  19.6 - 45.3 % Final   • Monocyte % 09/24/2022 5.9  5.0 - 12.0 % Final   • Eosinophil % 09/24/2022 8.4 (H)  0.3 - 6.2 % Final   • Basophil % 09/24/2022 0.7  0.0 - 1.5 % Final   • Immature Grans % 09/24/2022 0.4  0.0 - 0.5 % Final   • Neutrophils, Absolute 09/24/2022 5.26  1.70 - 7.00 10*3/mm3 Final   • Lymphocytes, Absolute 09/24/2022 4.41 (H)  0.70 - 3.10 10*3/mm3 Final   • Monocytes, Absolute 09/24/2022 0.68  0.10 - 0.90 10*3/mm3 Final   • Eosinophils, Absolute 09/24/2022 0.96 (H)  0.00 - 0.40 10*3/mm3 Final   • Basophils, Absolute 09/24/2022 0.08  0.00 - 0.20 10*3/mm3 Final   • Immature Grans, Absolute 09/24/2022 0.05  0.00 - 0.05 10*3/mm3 Final   • nRBC 09/24/2022 0.0  0.0 - 0.2 /100 WBC Final   • Extra Tube 09/24/2022 Hold for add-ons.   Final    Auto resulted.   • COVID19 09/24/2022 Not Detected  Not Detected - Ref. Range Final   • Influenza A PCR 09/24/2022 Not Detected  Not  Detected Final   • Influenza B PCR 09/24/2022 Not Detected  Not Detected Final      CT Chest Without Contrast Diagnostic  Narrative: EXAM:  CT Chest Without Intravenous Contrast  CLINICAL HISTORY:  77 years old, Female; Respiratory illness,  nondiagnostic xray, R06.02 Shortness of breath;  TECHNIQUE:  Axial computed tomography images of the chest without  intravenous contrast.  This CT exam was performed using one or  more of the following dose reduction techniques:  automated  exposure control, adjustment of the mA and/or kV according to  patient size, and/or use of iterative reconstruction technique.  COMPARISON:  No relevant prior studies available.    FINDINGS:    Lungs:  Mild atelectasis in the left upper lobe.    Pleural space:  No effusion or pneumothorax.    Heart:  No cardiomegaly.    Mediastinum: 2.0 x 1.0 x 2.3 cm mediastinal nodule or  subpleural mediastinal nodule in the left anterior mediastinal  fat.    Thyroid:  Indeterminate 1.3 x 1.0 cm partially calcified nodule  inferior to the right thyroid lobe near midline.     Bones/joints:  No acute findings.    Soft tissues:  No acute findings.    Lymph nodes:  No adenopathy.    Stomach and bowel:  Duodenal diverticulum partially visualized.    Tubes, lines and devices:  Pacemaker.  Impression:    Indeterminate 2.0 x 1.0 x 2.3 cm mediastinal nodule or  subpleural mediastinal nodule in the left anterior mediastinal  fat with adjacent mild atelectasis or scarring. Suggest  comparison to priors if available. Suggest follow-up and further  evaluation i.e consider PET scan.    Indeterminate 1.3 x 1.0 cm partially calcified nodule inferior  to the right thyroid lobe near midline. Parathyroid nodule not  excluded. Correlate clinically.    Electronically signed by:  Dario Perez MD  1/12/2023 6:15 PM  [facility] Workstation: [id number]      @[email or web address]@  Immunization History   Administered Date(s) Administered   • COVID-19 (MODERNA) 1st, 2nd, 3rd Dose Only  09/25/2021, 10/23/2021   • Fluzone High-Dose 65+yrs 12/20/2021   • Influenza, Unspecified 01/10/2018   • Pneumococcal Polysaccharide (PPSV23) 02/07/2022   • Pneumococcal, Unspecified 01/01/2013   • Tdap 12/20/2021     The following portions of the patient's history were reviewed and updated as appropriate: allergies, current medications, past family history, past medical history, past social history, past surgical history and problem list.    PHQ-9 Total Score:           Physical Exam  Constitutional:       Appearance: Normal appearance.   HENT:      Head: Normocephalic and atraumatic.      Right Ear: External ear normal.      Left Ear: External ear normal.   Eyes:      General:         Right eye: No discharge.         Left eye: No discharge.      Conjunctiva/sclera: Conjunctivae normal.   Cardiovascular:      Rate and Rhythm: Normal rate and regular rhythm.      Pulses: Normal pulses.      Heart sounds: Normal heart sounds. No murmur heard.  Pulmonary:      Effort: Pulmonary effort is normal. No respiratory distress.      Breath sounds: Normal breath sounds.   Abdominal:      General: There is no distension.      Palpations: Abdomen is soft.      Tenderness: There is no abdominal tenderness.   Musculoskeletal:      Cervical back: Normal range of motion.      Right lower leg: No edema.      Left lower leg: No edema.   Lymphadenopathy:      Cervical: No cervical adenopathy.   Neurological:      Mental Status: She is alert. Mental status is at baseline.   Psychiatric:         Mood and Affect: Mood normal.         Behavior: Behavior normal.         Assessment & Plan    Diagnosis Plan   1. Shortness of breath  Basic Metabolic Panel    Ambulatory Referral to Home Health      2. Stage 3b chronic kidney disease (HCC)  Basic Metabolic Panel    Ambulatory Referral to Home Health      3. Chronic obstructive pulmonary disease, unspecified COPD type (HCC)  Ambulatory Referral to Home Health         Orders Placed This Encounter    Procedures   • Basic Metabolic Panel   • Ambulatory Referral to Home Health     Referral Priority:   Routine     Referral Type:   Home Health     Referral Reason:   Specialty Services Required     Requested Specialty:   Home Health Services     Number of Visits Requested:   [phone]     Shortness of breath; unclear etiology, CT reassuring, no cardiac symptoms, no pulmonary symptoms, Lasix was ineffective, will discontinue.  After discussion with patient and daughter, deconditioning likely contributory, will refer to home health for physical therapy, Occupational Therapy due to deconditioning, fall risk, with hopes of improving quality of life, activities of daily living.  Has appropriate follow-up with pulmonology, cardiology next month, encouraged to maintain appointment.  Given strict ER/return precautions, thyroid ultrasound pending, thyroid levels pending, obtain BMP as above, follow-up in 1 month or sooner if needed.           This document has been electronically signed by Isaac Love MD on January 16, 2023 17:45 CST    EMR Dragon/Transciption Disclaimer: Some of this note may be an electronic transcription/translation of spoken language to printed text.  The electronic translation of spoken language may permit erroneous, or at times, nonsensical words or phrases to be inadvertently transcribed. Although I have reviewed the note for such errors, some may still exist.

## 2023-01-17 LAB — TSH SERPL DL<=0.05 MIU/L-ACNC: 1.05 UIU/ML (ref 0.27–4.2)

## 2023-01-18 ENCOUNTER — HOME HEALTH ADMISSION (OUTPATIENT)
Dept: HOME HEALTH SERVICES | Facility: HOME HEALTHCARE | Age: 78
End: 2023-01-18
Payer: MEDICARE

## 2023-01-20 ENCOUNTER — HOME CARE VISIT (OUTPATIENT)
Dept: HOME HEALTH SERVICES | Facility: CLINIC | Age: 78
End: 2023-01-20
Payer: MEDICARE

## 2023-01-20 VITALS
TEMPERATURE: 98.2 F | DIASTOLIC BLOOD PRESSURE: 80 MMHG | RESPIRATION RATE: 20 BRPM | HEART RATE: 60 BPM | SYSTOLIC BLOOD PRESSURE: 122 MMHG | OXYGEN SATURATION: 98 %

## 2023-01-20 PROCEDURE — G0151 HHCP-SERV OF PT,EA 15 MIN: HCPCS

## 2023-01-20 NOTE — HOME HEALTH
"Reason for referral: Pt referred to home health from MD appt for COPD exacerbation. Pt states she has been shortness of air for some days.  Past Med Hx:Seasonal allergies, Acute UTI, Congestive heart failure (HCC), Nonrheumatic mitral valve regurgitation, Nonrheumatic tricuspid valve regurgitation, Sick sinus syndrome (HCC) , Cardiac pacemaker in situ, Atrial fibrillation (HCC), Gout ,Chronic kidney disease, Moderate persistent asthma without complication, Complications, mechanical, pacemaker, cardiac     Patient Goal: \"I want to get breathing better,\"  Prior level of function: Pt cared for home and pets indep  Numbness/tingling: sometimes hands for years  Precautions:  Restrictions:"

## 2023-01-20 NOTE — Clinical Note
"[facility]  / Case Conference Note  Medical Necessity and focus of care:Reason for referral: Pt referred to home health from MD [facility] for COPD exacerbation. Pt states she has been shortness of air for some days. Pt presents a pleasant 77 year old and demos today min decreaed strength legs, decreaed endurance, and min decreased balance and gait deviations. Pt would benefit from skilled PT to address deficits and work toward indep and safety at home.  Past Med Hx:Seasonal allergies, Acute UTI, Congestive heart failure (HCC), Nonrheumatic mitral valve regurgitation, Nonrheumatic tricuspid valve regurgitation, Sick sinus syndrome (HCC) , Cardiac pacemaker in situ, Atrial fibrillation (HCC), Gout ,Chronic kidney disease, Moderate persistent asthma without complication, Complications, mechanical, pacemaker, cardiac     Patient Goal: \"I want to get breathing better,\"  Caregiver Status: Pt lives with daughter who works during the day  GG Discharge Goal: AMb 150 6  Medication [place]: pt has meds in home   Environmental/ Physical issues: clear pathways  Any additional needs:none noted    Based upon record review and collaboration conference, the recommended frequency for this patient is   SN-----  PT-----1w3  OT----  ST-----  HHA---  MSW---  Provider____Dr Love______ Notified @ ___1-20______ and agrees with POC    Please verify your eval  scores: (Answer the scores  that pertain to your area of focus remove the others)              M 1700  "

## 2023-01-23 ENCOUNTER — HOME CARE VISIT (OUTPATIENT)
Dept: HOME HEALTH SERVICES | Facility: CLINIC | Age: 78
End: 2023-01-23
Payer: MEDICARE

## 2023-01-23 VITALS — DIASTOLIC BLOOD PRESSURE: 74 MMHG | TEMPERATURE: 97.3 F | SYSTOLIC BLOOD PRESSURE: 122 MMHG | HEART RATE: 67 BPM

## 2023-01-23 PROCEDURE — G0152 HHCP-SERV OF OT,EA 15 MIN: HCPCS

## 2023-01-23 NOTE — HOME HEALTH
OCCUPATIONAL THERAPY EVALUATION       REASON FOR REFERRAL:   The patient is a 78 yo female admitted to home health from MD appointment for COPD exacerbation, weakness.       PAST MEDICAL HISTORY: acute UTI, CHF (HCC), pacemaker, afib, gout, CKD, nonrheumatic mitral valve regurgitation, nonrheumatic tricupsid valve regurgitation     HOME SOCIAL ENVIRONMENT: Patient lives with her daughter in one level home with to enter 3 steps to enter and hand rail. Her daughter Rita works during the day. The patient has a small dog and several birds in her home. She is using no AD and reports no recent falls.      PRIOR LEVEL OF FUNCTION: Patient lived with daughter and stays alone during the day, she was using no AD and reports she was mod IND with most ADLs and some simple IADLs but has become weaker, less active and having more SOA episodes over past several weeks.        CLINICAL FINDINGS:   UPPER EXTREMITY ASSESSMENT:     Pt is left handed, some mild arthritic changes in left hand .   /PINCH:       intact                                   FINE MOTOR COORDINATION:    slightly impaired due to arthritic changes                  UPPER EXTREMITY GROSS MOTOR COORDINATION: intact   SENSATION: intact                                                                     FUNCTIONAL ENDURANCE FOR SAFE ACTIVITIES OF DAILY LIVING/INSTRUMENTAL ACTIVITIES OF DAILY LIVING: Patient has COPD and becomes SOA with prolonged activity and standing. She requires cues for PLB and energy conservation techniques.  Limited endurance noted throughout evaluation requiring frequent rest breaks.          BALANCE:              SITTING BALANCE: GOOD   STANDING BALANCE:  FAIR  WEIGHT BEARING STATUS/PRECAUTIONS:   COPD, FWB  ASSISTIVE DEVICES: NONE      MEDICAL NECESSITY:  The patient has experienced exacerbation of COPD recently and has had increased weakness and SOA and is having difficculty participating in ADL And IADL. Patient requires skilled home  "based occupational therapy services for remediation of deficits of decreased functional use and weakness of B UEs, standing balance, endurance, and safety to maximize her independence with ADLs, transfers, and functional mobility with activities within the home environment.       PATIENT GOAL FOR THIS EPISODE OF CARE: \"Get stronger and breathe better\"  REHAB POTENTIAL :  GO[facility] FOR GOALS   DATE OF NEXT APPOINTMENT WITH DOCTOR: 2/10 Dr Medrano and Mariela Villegas      ANTICIPATED DISCHARGE PLAN:  TO SELF/CAREGIVER   PATIENT / CAREGIVER AGREE WITH DISCHARGE PLAN: YES   COMMUNICATION / CARE COORDINATION:  Case communication note to admitting PT with Functional Letts Scores/# of visits.   WISH TO ADDRESS BATHING WITH OT:NO"

## 2023-01-23 NOTE — Clinical Note
"Huddle  / Case Conference Note  Medical Necessity and focus of care: The patient has experienced exacerbation of COPD recently and has had increased weakness and SOA and is having difficculty participating in ADL And IADL. Patient requires skilled home based occupational therapy services for remediation of deficits of decreased functional use and weakness of B UEs, standing balance, endurance, and safety to maximize her independence with ADLs, transfers, and functional mobility with activities within the home environment.     Caregiver Status:lives with daughter who works during the day and is home at night   Patient's goal(s): \"to get stronger and breathe better\"  GG Discharge Goal:  Medication Issus:   Environmental/ Physical issues:none  Any additional needs:none    Based upon record review and collaboration conference, the recommended frequency for this patient is  SN-----  PT-----  OT--1wk4--  ST-----  HHA---  MSW---  Provider___Dr Love_______ Notified @ __1/23/23_______ and agrees with POC.     Please verify your eval  scores: (Answer the scores  that pertain to your area of focus remove the others)    2   2    2  "

## 2023-01-23 NOTE — Clinical Note
"Huddle  / Case Conference Note  Medical Necessity and focus of care: The patient has experienced exacerbation of COPD recently and has had increased weakness and SOA and is having difficculty participating in ADL And IADL. Patient requires skilled home based occupational therapy services for remediation of deficits of decreased functional use and weakness of B UEs, standing balance, endurance, and safety to maximize her independence with ADLs, transfers, and functional mobility with activities within the home environment.     Caregiver Status: Daughter in home, works during the day.   Patient's goal(s): \"Get stronger and breathe better\"  GG Discharge Goal:  Medication Issus:   Environmental/ Physical issues: none   Any additional needs:none     Based upon record review and collaboration conference, the recommended frequency for this patient is  SN-----  PT-----  OT----  ST-----  HHA---  MSW---  Provider__________ Notified @ _________ and agrees with POC.     Please verify your eval  scores: (Answer the scores  that pertain to your area of focus remove the others)          "

## 2023-01-24 ENCOUNTER — HOSPITAL ENCOUNTER (OUTPATIENT)
Dept: ULTRASOUND IMAGING | Facility: HOSPITAL | Age: 78
Discharge: HOME | End: 2023-01-24
Admitting: STUDENT IN AN ORGANIZED HEALTH CARE EDUCATION/TRAINING PROGRAM
Payer: MEDICARE

## 2023-01-24 DIAGNOSIS — E04.1 THYROID NODULE: ICD-10-CM

## 2023-01-24 PROCEDURE — 76536 US EXAM OF HEAD AND NECK: CPT

## 2023-01-25 ENCOUNTER — HOME CARE VISIT (OUTPATIENT)
Dept: HOME HEALTH SERVICES | Facility: CLINIC | Age: 78
End: 2023-01-25
Payer: MEDICARE

## 2023-01-25 PROCEDURE — G0157 HHC PT ASSISTANT EA 15: HCPCS

## 2023-01-26 VITALS
HEART RATE: 63 BPM | TEMPERATURE: 97.6 F | SYSTOLIC BLOOD PRESSURE: 118 MMHG | DIASTOLIC BLOOD PRESSURE: 62 MMHG | RESPIRATION RATE: 18 BRPM | OXYGEN SATURATION: 99 %

## 2023-01-27 DIAGNOSIS — E04.1 THYROID NODULE: Primary | ICD-10-CM

## 2023-01-27 PROCEDURE — G0180 MD CERTIFICATION HHA PATIENT: HCPCS | Performed by: STUDENT IN AN ORGANIZED HEALTH CARE EDUCATION/TRAINING PROGRAM

## 2023-01-30 ENCOUNTER — HOME CARE VISIT (OUTPATIENT)
Dept: HOME HEALTH SERVICES | Facility: CLINIC | Age: 78
End: 2023-01-30
Payer: MEDICARE

## 2023-01-30 PROCEDURE — G0157 HHC PT ASSISTANT EA 15: HCPCS

## 2023-01-31 VITALS
SYSTOLIC BLOOD PRESSURE: 150 MMHG | DIASTOLIC BLOOD PRESSURE: 85 MMHG | HEART RATE: 60 BPM | OXYGEN SATURATION: 95 % | RESPIRATION RATE: 18 BRPM | TEMPERATURE: 97.6 F

## 2023-02-01 ENCOUNTER — HOME CARE VISIT (OUTPATIENT)
Dept: HOME HEALTH SERVICES | Facility: CLINIC | Age: 78
End: 2023-02-01
Payer: MEDICARE

## 2023-02-01 VITALS
OXYGEN SATURATION: 94 % | RESPIRATION RATE: 18 BRPM | SYSTOLIC BLOOD PRESSURE: 136 MMHG | DIASTOLIC BLOOD PRESSURE: 70 MMHG | TEMPERATURE: 97.8 F | HEART RATE: 60 BPM

## 2023-02-01 PROCEDURE — G0158 HHC OT ASSISTANT EA 15: HCPCS

## 2023-02-01 NOTE — CASE COMMUNICATION
PT WAS REFERRED TO HOME HEALTH FOLLOWING: Reason for referral: Pt referred to home health from [facility] for COPD exacerbation. Pt states she has been shortness of air for some days    PRIOR VS CURRENT LEVEL OF FUNCTION:    GAIT: Gait x50'ft x3 attempts without AD and supervision on eval. Gait x200'ft without AD and independence on D/C.  T/F: Sit to stands with supervision on eval. Sit to stands with independence on D/C.     BED MOBILITY:  Independent on eval and on D/C    DISCHARGE CONDITION: GOOD    DISCHARGE REASON: GOALS MET    DISCHARGE TO SELF-CARE WITH FAMILY ASSIST AS NEEDED.      NEXT MD VISIT: 2/10/2023 10:30 AM Shiloh Medrano MD

## 2023-02-07 ENCOUNTER — HOME CARE VISIT (OUTPATIENT)
Dept: HOME HEALTH SERVICES | Facility: CLINIC | Age: 78
End: 2023-02-07
Payer: MEDICARE

## 2023-02-07 VITALS
TEMPERATURE: 97.6 F | HEART RATE: 62 BPM | SYSTOLIC BLOOD PRESSURE: 136 MMHG | DIASTOLIC BLOOD PRESSURE: 76 MMHG | RESPIRATION RATE: 20 BRPM | OXYGEN SATURATION: 94 %

## 2023-02-07 PROCEDURE — G0158 HHC OT ASSISTANT EA 15: HCPCS

## 2023-02-07 NOTE — CASE COMMUNICATION
Patient aware and agreeable to D/C next session.  Patient likely to meet all goals.    Yfn MOLINA  2/7/23

## 2023-02-10 ENCOUNTER — OFFICE VISIT (OUTPATIENT)
Dept: PULMONOLOGY | Facility: CLINIC | Age: 78
End: 2023-02-10
Payer: MEDICARE

## 2023-02-10 ENCOUNTER — OFFICE VISIT (OUTPATIENT)
Dept: CARDIOLOGY | Facility: CLINIC | Age: 78
End: 2023-02-10
Payer: MEDICARE

## 2023-02-10 VITALS
WEIGHT: 194 LBS | BODY MASS INDEX: 38.09 KG/M2 | OXYGEN SATURATION: 93 % | SYSTOLIC BLOOD PRESSURE: 126 MMHG | HEART RATE: 61 BPM | DIASTOLIC BLOOD PRESSURE: 72 MMHG | HEIGHT: 60 IN

## 2023-02-10 VITALS
DIASTOLIC BLOOD PRESSURE: 72 MMHG | OXYGEN SATURATION: 93 % | HEIGHT: 60 IN | WEIGHT: 196 LBS | SYSTOLIC BLOOD PRESSURE: 126 MMHG | BODY MASS INDEX: 38.48 KG/M2 | HEART RATE: 61 BPM

## 2023-02-10 DIAGNOSIS — I50.32 CHRONIC DIASTOLIC CONGESTIVE HEART FAILURE: ICD-10-CM

## 2023-02-10 DIAGNOSIS — I48.0 PAROXYSMAL ATRIAL FIBRILLATION: ICD-10-CM

## 2023-02-10 DIAGNOSIS — J45.40 MODERATE PERSISTENT ASTHMA WITHOUT COMPLICATION: Primary | ICD-10-CM

## 2023-02-10 DIAGNOSIS — E66.09 CLASS 2 OBESITY DUE TO EXCESS CALORIES WITHOUT SERIOUS COMORBIDITY WITH BODY MASS INDEX (BMI) OF 38.0 TO 38.9 IN ADULT: ICD-10-CM

## 2023-02-10 DIAGNOSIS — I34.0 NONRHEUMATIC MITRAL VALVE REGURGITATION: Primary | ICD-10-CM

## 2023-02-10 DIAGNOSIS — I49.5 SICK SINUS SYNDROME: ICD-10-CM

## 2023-02-10 PROCEDURE — 99214 OFFICE O/P EST MOD 30 MIN: CPT | Performed by: INTERNAL MEDICINE

## 2023-02-10 PROCEDURE — 99214 OFFICE O/P EST MOD 30 MIN: CPT | Performed by: NURSE PRACTITIONER

## 2023-02-10 NOTE — PROGRESS NOTES
Pennie Gibson  77 y.o. female    2/10/2023  1. Nonrheumatic mitral valve regurgitation    2. Sick sinus syndrome (HCC)    3. Paroxysmal atrial fibrillation (HCC)    4. Chronic diastolic congestive heart failure (HCC)    5. Class 2 obesity due to excess calories without serious comorbidity with body mass index (BMI) of 38.0 to 38.9 in adult        History of Present Illness:  Body mass index is 38.28 kg/m². BMI is above normal parameters. Recommendations include: exercise counseling, nutrition counseling and referral to primary care.  77 years old patient presented today for routine follow-up with concurrent multiple medical problem of moderate mitral regurgitation sick sinus syndrome s/p pacemaker paroxysmal atrial fibrillation and diastolic dysfunction with evidence of congestive heart failure she is euvolemic at the time of evaluation.  She has a nonobstructive CAD and history of stage III chronic kidney disease paroxysmal atrial fibrillation's.  Cardiac catheterization in March 28 and then 2010 revealed mild nonobstructive CAD on cath in April 2020 no significant CAD was noted        Date of Procedure: 05/26/22     Referring Physician:      /ELECTROPHYSIOLOGIST:            PROCEDURE(S) PERFORMED:    1. Removal of a dual-chamber permanent pacemaker generator.  2. Insertion of a dual-chamber permanent pacemaker generator.     INDICATIONS FOR PROCDEDURE:            Pacemaker reached LULU status    Echo September 2021    · Estimated left ventricular EF = 61% Left ventricular ejection fraction appears to be 61 - 65%. Left ventricular systolic function is normal.  · Left ventricular diastolic function is consistent with (grade Ia w/high LAP) impaired relaxation.  · Left atrial volume is moderately increased.  · Moderate mitral valve regurgitation is present.  · Estimated right ventricular systolic pressure from tricuspid regurgitation is normal (<35 mmHg).    Implant date: 6/12/2013     Reason  for evaluation:routine  Cardiac device indication(s): sick sinus syndrome     Battery  LULU: <3 months                Interrogation Results  Atrial sensin.6 mV  Atrial capture: 0.7 V @ 0.4 ms   Atrial lead impedance: 494 ohms  Ventricular sensing: R wave: 19.1 mV @ 70 bpm  Ventricular capture: 0.5 V @ 0.4 ms   Ventricular lead impedance: right 499 ohms     Parameters  Mode: DDD  Base Rate: 60/130     Diagnostic Data  Atrial paced: 43 %   Ventricular paced: <1 %  Mode switch: <1%  AT/AF McDavid: <1%  AHR: 0  VHR: 1 non-sustained     Intrinsic Rate: <30     Changes made: Changed RA output to 1.5 V.     Conclusions: normal device function. Follow up in 1 months for LULU check.     Assessment:  1. Sick sinus syndrome (HCC)    2. 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   • CHF (congestive heart failure) (HCC)    • COPD (chronic obstructive pulmonary disease) (HCC)    • High blood pressure    • Hyperlipidemia    • Pacemaker    • Stage 3 chronic kidney disease (HCC)    • Stroke (HCC)     TIA         Past Surgical History:   Procedure Laterality Date   • BLADDER SURGERY     • CARDIAC ELECTROPHYSIOLOGY PROCEDURE N/A 2022    Procedure: PPM generator change - dual;  Surgeon: Shiloh Medrano MD;  Location: [facility] INVASIVE LOCATION;  Service: Cardiology;  Laterality: N/A;   • CARDIAC SURGERY     • COLON RESECTION      x 2 small bowel   • HYSTERECTOMY     • PACEMAKER IMPLANTATION     • VAGINAL BIOPSY           Family History   Problem Relation Age of Onset   • Cancer Mother    • Diabetes  Father    • Diabetes Sister    • Diabetes Brother          Social History     Socioeconomic History   • Marital status:    Tobacco Use   • Smoking status: Former     Packs/day: 2.00     Years: 45.00     Pack years: 90.00     Types: Cigarettes     Start date: 3/12/1953     Quit date: 3/12/1993     Years since quittin.9   • Smokeless tobacco: Never   Vaping Use   • Vaping Use: Never used   Substance and Sexual Activity   • Alcohol use: Not Currently   • Drug use: Never   • Sexual activity: Defer         Current Outpatient Medications   Medication Sig Dispense Refill   • albuterol sulfate  (90 Base) MCG/ACT inhaler Inhale 2 puffs Every 4 (Four) Hours As Needed for Wheezing. 18 g 6   • allopurinol (ZYLOPRIM) 100 MG tablet Take 1 tablet by mouth Daily. 90 tablet 0   • aspirin (aspirin) 81 MG EC tablet Take 1 tablet by mouth Daily. 90 tablet 1   • atorvastatin (LIPITOR) 20 MG tablet Take 1 tablet by mouth Daily. Indications: High Amount of Fats in the Blood     • calcium carbonate (OS-MICHELL) 600 MG tablet Take 600 mg by mouth Daily.     • Cranberry 1000 MG capsule Take 4,200 mg by mouth Daily.     • Cyanocobalamin (VITAMIN B 12 PO) Take 1,000 mcg by mouth Daily.     • Eliquis 5 MG tablet tablet TAKE 1 TABLET BY MOUTH EVERY 12 HOURS 180 tablet 0   • Garlic 1000 MG capsule Take 1,000 mg by mouth Daily.     • lisinopril (PRINIVIL,ZESTRIL) 20 MG tablet Take 0.5 tablets by mouth Daily. 90 tablet 0   • metoprolol tartrate (LOPRESSOR) 100 MG tablet Take 1 tablet by mouth twice daily 180 tablet 0   • montelukast (Singulair) 10 MG tablet Take 1 tablet by mouth Every Night. 90 tablet 1   • Symbicort 80-4.5 MCG/ACT inhaler Inhale 2 puffs 2 (Two) Times a Day. 6.9 g 12   • vitamin C (ASCORBIC ACID) 250 MG tablet Take 250 mg by mouth 2 (Two) Times a Day.       No current facility-administered medications for this visit.           Review of Systems:   Today,no change was noted in the review of system compared to the  "previous  Constitutional:  Denies recent weight loss, weight gain,no change in exercise tolerance.     HENT:  Denies any hearing loss, epistaxis    Eyes: No blurring    Respiratory: Severe COPD mild baseline shortness    Cardiovascular: See H&P    Gastrointestinal:  Denies change in bowel habits and dyspepsia    Endocrine: Negative for cold intolerance, heat intolerance, polydipsia    Genitourinary: Negative.      Musculoskeletal: History of osteoarthritis    Skin:  Deniesrashes, or skin lesions.     Allergic/Immunologic: Negative.  Negative for environmental allergies    Neurological:  Denies any history of recurrent headaches, strokes,     Hematological: Denies any food allergies, seasonal allergies    Psychiatric/Behavioral: Denies any history of depression        OBJECTIVE:    /72 (BP Location: Left arm, Patient Position: Sitting, Cuff Size: Adult)   Pulse 61   Ht 152.4 cm (60\")   Wt 88.9 kg (196 lb)   SpO2 93%   BMI 38.28 kg/m²     Physical Exam:   Today dated  no change was noted in physical exam comparison to previous  Constitutional: Cooperative, alert and oriented, well-developed, well-nourished, in no acute distress.     HENT:   Head: Normocephalic, conjunctive is a pink, thyroid is nonpalpable no carotid bruit and trachea central.     Cardiovascular: Regular rhythm, S1 and S2 normal, no S3 or S4. Apical impulse not displaced.  Positive systolic murmur  Pulmonary/Chest: Chest: No chest wall tenderness no rales and wheezing    Abdominal: Abdomen soft, bowel sounds normoactive, no masses,    Musculoskeletal: No deformities, clubbing, cyanosis, erythema.   Neurological: No gross motor or sensory deficits noted    Skin: Warm and dry to the touch, no apparent skin lesions .     Psychiatric: He has a normal mood and affect. His behavior is normal        Procedures      Lab Results   Component Value Date    WBC 9.23 12/31/2022    HGB 13.6 12/31/2022    HCT 40.8 12/31/2022    MCV 88.1 12/31/2022    PLT " 218 12/31/2022     Lab Results   Component Value Date    GLUCOSE 105 (H) 12/31/2022    BUN 18 12/31/2022    CREATININE 1.44 (H) 12/31/2022    EGFRIFNONA 43 (L) 07/06/2021    BCR 12.5 12/31/2022    CO2 23.0 12/31/2022    CALCIUM 9.6 12/31/2022    ALBUMIN 3.6 12/31/2022    AST 25 12/31/2022    ALT 13 12/31/2022     Lab Results   Component Value Date    CHOL 84 07/14/2022    CHOL 148 05/17/2021     Lab Results   Component Value Date    TRIG 101 07/14/2022    TRIG 136 05/17/2021     Lab Results   Component Value Date    HDL 41 07/14/2022    HDL 33 (L) 05/17/2021     No components found for: LDLCALC  Lab Results   Component Value Date    LDL 24 07/14/2022    LDL 91 05/17/2021     No results found for: HDLLDLRATIO  No components found for: CHOLHDL  No results found for: HGBA1C  Lab Results   Component Value Date    TSH 1.050 01/16/2023           ASSESSMENT AND PLAN:    #1 chronic congestive heart failure due to preserved left ventricular systolic    She is asymptomatic and euvolemic at the time of evaluation.  She is a pleased with her clinical outcome.  She remained physically active she has good mental status.  We will continue lisinopril, metoprolol      #2 sick sinus syndrome    Continue follow-up with the [facility]      3 paroxysmal atrial fibrillation continue metoprolol         [id number] score is 4 continue oral anticoagulation        #4 nonrheumatic mitral regurgitation    Clinically there is no evidence of progression will follow with periodic echo as per recommendation        Preventive    Class II obesity with BMI of 38-38.9 with history of hypertension, diastolic dysfunction    Significant low-carb weight, low-fat, DASH diet and graded exercise discussed.    I spent 16 minutes caring for Pennie on this date of service. This time includes time spent by me of counseling/coordination of care as relates to the presenting problem and any ordered procedures/tests as outlined above.           This document has  been electronically signed by Shiloh Medrano MD on February 10, 2023 11:22 CST        Diagnoses and all orders for this visit:    1. Nonrheumatic mitral valve regurgitation (Primary)  -     Adult Transthoracic Echo Complete W/ Cont if Necessary Per Protocol; Future    2. Sick sinus syndrome (HCC)    3. Paroxysmal atrial fibrillation (HCC)    4. Chronic diastolic congestive heart failure (HCC)  -     Adult Transthoracic Echo Complete W/ Cont if Necessary Per Protocol; Future    5. Class 2 obesity due to excess calories without serious comorbidity with body mass index (BMI) of 38.0 to 38.9 in adult          Shiloh Medrano MD  2/10/2023  11:22 CST

## 2023-02-10 NOTE — PROGRESS NOTES
Pulmonary Office Visit    Thank you for asking me to see Pennie Perdomoman for   Chief Complaint   Patient presents with   • Asthma       History of Present Illness    2/10/23: Returns for 3 month follow up for asthma.  She continues to use her symbicort 80mcg twice a day. She is not using her Albuterol much and probably should be. I will get her a spacer. She continues with SALEH. This has been stable for the past 6 months. Of note, she still has her birds in the house.   Ct reviewed by Dr. Corrigan, minimal lymph node enlargement. Eosinophils normal. BNP continues to be normal.       10/31/22: Returns for 2 week follow up for asthma. She was not using her Symbicort correctly at our last visit. She is now using her symbicort twice a day with the help of Csaie baig. She had breakfast at [employer] last week for which she was busy and active. She was winded but did not have to use albuterol. She would not have been able to do that a month ago. I had intended to increase her to 160mcg Symbicort, but she has so many 80mcg she wanted to stay with that dose. I corrected that script today.       10/7/22: Here sooner than scheduled appointment at request of patients daughter following two ER visits on 9/24 and 9/30. She received a Doxycycline and medrol on the 24th and cough medication on the 30th. She reached out to her PCP without response thus far, so she came here.   She is on Symbicort 80 and Albuterol HFA PRN. She has asthma history. She has been on these since 5/2022.   ER notes reveal and WBC increase from 11 to 19, that could be from Medrol pack but most likely from infectious process. Does not appear to be a lung source with no sputum production.     Of note, she does have valve disease and AF that could be contributing to her dyspnea on exertion. BNP negative.      She has fairly normal PFTs without obstruction at her initial visit. She does not have COPD. She may have a presentation of Asthma, but history  "was hard to obtain from her prior life in [place]. She said her and her sister always had asthma.    She only complains of a mild cough when taking deep breath. Denies any wheezing. Unable to take a deep breath.   When questioned further, she has only been using her inhaler every once and a while. She does not want to \"be dependent upon an inhaler to breathe\". I did my best to explain to her that if this is in fact an asthma exacerbation, that she will need inhalers and steroids to improve. Not taking her inhalers and then being seen again for dyspnea is a counterproductive visit. Her daughter agreed.   I asked her to demonstrate her inhaler use. She is taking 2 puffs at once versus one at a time. We went over proper technique.   I had her blow on a flow meter. She achieved 1.55, 1.77, 1.65L. She is not red zone.       Dr. Yao's last 2 visits: \"8/15/22  Patient here for follow up. At last visit, I started her on symbicort for asthma. She is doing well with it. Feels like her breathing is fine, but also tells me she can't remember what her breathing was like before she started the inhaler. She gets winded with physical activity but recovers quickly      5/13/22  Patient referred from [facility] for \"COPD\". No notes in the chart with further elaboration on this     Patient is here with her daughter. They say she was diagnosed with asthma years ago. She has a rescue inhaler but that's all, and doesn't use it because she doesn't like depending on it. Not clear if it really helps or not. Doesn't recall being on any controller meds. Feells like her breathing has gotten a little worse lately but not really sure over what time frame.     She moved from [place] about a year ago. Does notice her breathing is worse in the humidity     1 dog and 3 birds. Birds are new in the last year. Cockatiel, canary and parakeet\"        Trouble with inhaler use/spacer need:  No spacer needed. Taking 2 puffs at once versus one at a time.     ER " visits/hospitalization/exacerbations at PCP/[facility]: 22 and 22    Tobacco use history:  Social History     Socioeconomic History   • Marital status:    Tobacco Use   • Smoking status: Former     Packs/day: 2.00     Years: 45.00     Pack years: 90.00     Types: Cigarettes     Start date: 3/12/1955     Quit date: 3/12/1993     Years since quittin.9   • Smokeless tobacco: Never   Vaping Use   • Vaping Use: Never used   Substance and Sexual Activity   • Alcohol use: Not Currently   • Drug use: Never   • Sexual activity: Defer       Review of Systems: History obtained from chart review and the patient.  Review of Systems   Constitutional: Negative for diaphoresis, fatigue, fever and unexpected weight change.   Respiratory: Negative for cough, chest tightness, shortness of breath and wheezing.    Cardiovascular: Negative for chest pain, palpitations and leg swelling.   Gastrointestinal: Negative for abdominal distention and blood in stool.   Genitourinary: Negative for hematuria.   Musculoskeletal: Negative for arthralgias and myalgias.   Skin: Negative for pallor and rash.   Neurological: Negative for dizziness, syncope and weakness.   Hematological: Does not bruise/bleed easily.   Psychiatric/Behavioral: Negative for confusion. The patient is not nervous/anxious.        As described in the HPI. Otherwise, remainder of ROS (14 systems) were negative.    Patient Active Problem List   Diagnosis   • Seasonal allergies   • Acute UTI   • Congestive heart failure (HCC)   • Nonrheumatic mitral valve regurgitation   • Nonrheumatic tricuspid valve regurgitation   • Sick sinus syndrome (HCC)   • Cardiac pacemaker in situ   • Atrial fibrillation (HCC)   • Gout   • Chronic kidney disease   • Moderate persistent asthma without complication   • Complications, mechanical, pacemaker, cardiac   • Class 2 obesity due to excess calories without serious comorbidity with body mass index (BMI) of 38.0 to 38.9 in adult  "        Current Outpatient Medications:   •  albuterol sulfate  (90 Base) MCG/ACT inhaler, Inhale 2 puffs Every 4 (Four) Hours As Needed for Wheezing., Disp: 18 g, Rfl: 6  •  allopurinol (ZYLOPRIM) 100 MG tablet, Take 1 tablet by mouth Daily., Disp: 90 tablet, Rfl: 0  •  aspirin (aspirin) 81 MG EC tablet, Take 1 tablet by mouth Daily., Disp: 90 tablet, Rfl: 1  •  atorvastatin (LIPITOR) 20 MG tablet, Take 1 tablet by mouth Daily. Indications: High Amount of Fats in the Blood, Disp: , Rfl:   •  calcium carbonate (OS-MICHELL) 600 MG tablet, Take 600 mg by mouth Daily., Disp: , Rfl:   •  Cranberry 1000 MG capsule, Take 4,200 mg by mouth Daily., Disp: , Rfl:   •  Cyanocobalamin (VITAMIN B 12 PO), Take 1,000 mcg by mouth Daily., Disp: , Rfl:   •  Eliquis 5 MG tablet tablet, TAKE 1 TABLET BY MOUTH EVERY 12 HOURS, Disp: 180 tablet, Rfl: 0  •  Garlic 1000 MG capsule, Take 1,000 mg by mouth Daily., Disp: , Rfl:   •  lisinopril (PRINIVIL,ZESTRIL) 20 MG tablet, Take 0.5 tablets by mouth Daily., Disp: 90 tablet, Rfl: 0  •  metoprolol tartrate (LOPRESSOR) 100 MG tablet, Take 1 tablet by mouth twice daily, Disp: 180 tablet, Rfl: 0  •  montelukast (Singulair) 10 MG tablet, Take 1 tablet by mouth Every Night., Disp: 90 tablet, Rfl: 1  •  Symbicort 80-4.5 MCG/ACT inhaler, Inhale 2 puffs 2 (Two) Times a Day., Disp: 6.9 g, Rfl: 12  •  vitamin C (ASCORBIC ACID) 250 MG tablet, Take 250 mg by mouth 2 (Two) Times a Day., Disp: , Rfl:     Allergies   Allergen Reactions   • Penicillins Rash       Advance Care Planning   ACP discussion was declined by the patient. Patient does not have an advance directive, declines further assistance.          Objective     Vitals:    02/10/23 1023   BP: 126/72   Pulse: 61   SpO2: 93%   Weight: 88 kg (194 lb)   Height: 152.4 cm (60\")       Physical Exam  Vitals and nursing note reviewed.   Constitutional:       General: She is not in acute distress.     Appearance: She is well-developed. She is not " diaphoretic.   HENT:      Head: Normocephalic.   Eyes:      Conjunctiva/sclera: Conjunctivae normal.   Neck:      Vascular: No JVD.   Cardiovascular:      Rate and Rhythm: Normal rate and regular rhythm.      Heart sounds: Normal heart sounds, S1 normal and S2 normal. No murmur heard.    No friction rub. No gallop.   Pulmonary:      Effort: Pulmonary effort is normal. No respiratory distress.      Breath sounds: Normal breath sounds. No wheezing or rales.   Abdominal:      General: Bowel sounds are normal. There is no distension.      Palpations: Abdomen is soft.   Musculoskeletal:         General: Normal range of motion.   Skin:     General: Skin is warm and dry.      Findings: No erythema.   Neurological:      Mental Status: She is alert and oriented to person, place, and time.   Psychiatric:         Behavior: Behavior normal.         Thought Content: Thought content normal.         Judgment: Judgment normal.         PFTs              Radiology (independently reviewed by me, interpreted by physcian)    CT Chest Without Contrast Diagnostic    Result Date: 1/12/2023     Indeterminate 2.0 x 1.0 x 2.3 cm mediastinal nodule or subpleural mediastinal nodule in the left anterior mediastinal fat with adjacent mild atelectasis or scarring. Suggest comparison to priors if available. Suggest follow-up and further evaluation i.e consider PET scan.   Indeterminate 1.3 x 1.0 cm partially calcified nodule inferior to the right thyroid lobe near midline. Parathyroid nodule not excluded. Correlate clinically. Electronically signed by:  Dario Perez MD  1/12/2023 6:15 PM [facility] Workstation: [id number]    US Thyroid    Result Date: 1/26/2023  CONCLUSION:  1.  In the inferior right lobe there is a 1.3 x 1.1 x 1.2 cm nodule that appears solid, isoechoic, and contains internal punctate echogenic foci. Recommend fine-needle aspiration. 2.  There is a hypoechoic area inferior and lateral to the right lobe of the thyroid measuring 1.9 x  1 x 1.7 cm which appears solid and contains punctate echogenic foci, suspicious for a thyroid nodule. Recommend fine-needle aspiration. 3.  In the left lobe inferior aspect there is a tiny hypoechoic nodule measuring 0.8 x 0.8 x 0.5 cm. Recommend follow-up ultrasound in one year. Electronically signed by:  Yaniv Newell MD  1/26/2023 9:37 AM CST Workstation: [id number]         Assessment       Discussion/ Recommendations:    Diagnosis Plan   1. Moderate persistent asthma without complication  - continue Symbicort 80mcg 2 puffs BID  - Allegra for rhinnorhea     - Albuterol as needed for dyspnea and cough.    - Return demonstration and education preformed for inhaler.   - Encouraged medication compliance including inhalers     - she responded well to Prednisone taper.        BMI is >= 30 and <35. (Class 1 Obesity). The following options were offered after discussion;: exercise counseling/recommendations      Follow up: 3 months Asthma.       I spent 35 minutes caring for Pennie on this date of service. This time includes time spent by me in the following activities: preparing for the visit, reviewing tests, obtaining and/or reviewing a separately obtained history, performing a medically appropriate examination and/or evaluation, counseling and educating the patient/family/caregiver, ordering medications, tests, or procedures, referring and communicating with other health care professionals, documenting information in the medical record, independently interpreting results and communicating that information with the patient/family/caregiver and care coordination            This document has been electronically signed by DEN David on February 10, 2023 12:26 CST

## 2023-02-13 ENCOUNTER — HOME CARE VISIT (OUTPATIENT)
Dept: HOME HEALTH SERVICES | Facility: CLINIC | Age: 78
End: 2023-02-13
Payer: MEDICARE

## 2023-02-13 VITALS — DIASTOLIC BLOOD PRESSURE: 96 MMHG | HEART RATE: 58 BPM | SYSTOLIC BLOOD PRESSURE: 142 MMHG | TEMPERATURE: 97.3 F

## 2023-02-13 PROCEDURE — G0152 HHCP-SERV OF OT,EA 15 MIN: HCPCS

## 2023-02-14 RX ORDER — ATORVASTATIN CALCIUM 20 MG/1
TABLET, FILM COATED ORAL
Qty: 90 TABLET | Refills: 0 | Status: SHIPPED | OUTPATIENT
Start: 2023-02-14

## 2023-02-14 NOTE — HOME HEALTH
OCCUPATIONAL THERAPY DISCHARGE:     SUMMARY OF CARE: THE PATIENT HAS RECEIVED 4 SKILLED OT VISITS AFTER MD REFERRAL FOR LOW ENDURANCE AND STRENGTH. SHE HAS MADE GOOD PROGRESS MEETING ALL GOALS. TREATMENT HAS FOCUSED ON ENERGY CONSERVATION, ACTIVITY TOLERANCE AND STRENGTH TRAINING FOR ADLS AND FUNCTIONAL MOBILITY IN HOME. THE PATIENT HAS IMPROVED ENDURANCE AND IS BETTER ABLE TO COMPLETE ADL/ IADL IN HOME INDEPENDENTLY.       PRIOR VS CURRENT LEVEL   AT EVAL THE PATIENT REQUIRED CGA-MIN A WITH ADLS AND IADLS AND MOBILITY USING NO AD AND AT DC THE PATIENT IS ABLE TO COMPLETE ADL/ IADL AND MOBILITY WITH MOD IND-IND, SHE IS ABLE TO VERBALIZE ENERGY CONSERVATION TECHNIQUES AND IND WITH HEP       DISCHARGE STATUS     TO SELF-FAMILY AT OWN HOME      DISCHARGE CONDITION   GOOD      DISCHARGE REASON    GOALS MET       MD VISIT: 2/16 DR JESUS       INSTRUCTIONS HOME PROGRAM PROVIDED TO PATIENT           CONTENT: UPPER BODY HEP AND ENERGY CONSERVATION TECHNIQUES           PATIENT CAREGIVER LEVEL OF COMPLIANCE: [employer] DEFICITS       UNMET NEEDS NONE  SERVICES CONTINUING NONE       COMMUNICATION / CARE COORDINATION:        PT AND DURBIN        HHACN/MEDICARE 2 DAY NOTICE GIVEN: YES       PATIENT AND CAREGIVER (NAME) ARE AGREEABLE WITH DISCHARGE PLAN YES

## 2023-02-16 ENCOUNTER — OFFICE VISIT (OUTPATIENT)
Dept: FAMILY MEDICINE CLINIC | Facility: CLINIC | Age: 78
End: 2023-02-16
Payer: MEDICARE

## 2023-02-16 VITALS
WEIGHT: 198 LBS | BODY MASS INDEX: 38.67 KG/M2 | TEMPERATURE: 98.9 F | HEART RATE: 90 BPM | SYSTOLIC BLOOD PRESSURE: 114 MMHG | OXYGEN SATURATION: 93 % | DIASTOLIC BLOOD PRESSURE: 68 MMHG

## 2023-02-16 DIAGNOSIS — Z23 NEED FOR VACCINATION: ICD-10-CM

## 2023-02-16 DIAGNOSIS — R06.02 SHORTNESS OF BREATH: Primary | ICD-10-CM

## 2023-02-16 PROCEDURE — 90677 PCV20 VACCINE IM: CPT | Performed by: STUDENT IN AN ORGANIZED HEALTH CARE EDUCATION/TRAINING PROGRAM

## 2023-02-16 PROCEDURE — G0009 ADMIN PNEUMOCOCCAL VACCINE: HCPCS | Performed by: STUDENT IN AN ORGANIZED HEALTH CARE EDUCATION/TRAINING PROGRAM

## 2023-02-16 PROCEDURE — 99213 OFFICE O/P EST LOW 20 MIN: CPT | Performed by: STUDENT IN AN ORGANIZED HEALTH CARE EDUCATION/TRAINING PROGRAM

## 2023-02-16 NOTE — PROGRESS NOTES
Subjective:  Pennie Gibson is a 77 y.o. female who presents for     SOA; states that has seen pulmonology and cardiology since last visit. States that was given a spacer for inhaler and steroid treatment and for the past 3 days has been feeling much better. Has upcoming ECHO in may but denies any cardiac symptoms, fever, chills, cough, leg swelling. Also had home health which seemed to improve her SOA.      Patient Active Problem List   Diagnosis   • Seasonal allergies   • Acute UTI   • Congestive heart failure (HCC)   • Nonrheumatic mitral valve regurgitation   • Nonrheumatic tricuspid valve regurgitation   • Sick sinus syndrome (HCC)   • Cardiac pacemaker in situ   • Atrial fibrillation (HCC)   • Gout   • Chronic kidney disease   • Moderate persistent asthma without complication   • Complications, mechanical, pacemaker, cardiac   • Class 2 obesity due to excess calories without serious comorbidity with body mass index (BMI) of 38.0 to 38.9 in adult     Vitals:    Vitals:    02/16/23 1714   BP: 114/68   Pulse: 90   Temp: 98.9 °F (37.2 °C)   SpO2: 93%   Weight: 89.8 kg (198 lb)     Body mass index is 38.67 kg/m².      Current Outpatient Medications:   •  albuterol sulfate  (90 Base) MCG/ACT inhaler, Inhale 2 puffs Every 4 (Four) Hours As Needed for Wheezing., Disp: 18 g, Rfl: 6  •  allopurinol (ZYLOPRIM) 100 MG tablet, Take 1 tablet by mouth Daily., Disp: 90 tablet, Rfl: 0  •  aspirin (aspirin) 81 MG EC tablet, Take 1 tablet by mouth Daily., Disp: 90 tablet, Rfl: 1  •  atorvastatin (LIPITOR) 20 MG tablet, Take 1 tablet by mouth once daily, Disp: 90 tablet, Rfl: 0  •  calcium carbonate (OS-MICHELL) 600 MG tablet, Take 600 mg by mouth Daily., Disp: , Rfl:   •  Cranberry 1000 MG capsule, Take 4,200 mg by mouth Daily., Disp: , Rfl:   •  Cyanocobalamin (VITAMIN B 12 PO), Take 1,000 mcg by mouth Daily., Disp: , Rfl:   •  Eliquis 5 MG tablet tablet, TAKE 1 TABLET BY MOUTH EVERY 12 HOURS, Disp: 180 tablet, Rfl:  0  •  Garlic 1000 MG capsule, Take 1,000 mg by mouth Daily., Disp: , Rfl:   •  lisinopril (PRINIVIL,ZESTRIL) 20 MG tablet, Take 0.5 tablets by mouth Daily., Disp: 90 tablet, Rfl: 0  •  metoprolol tartrate (LOPRESSOR) 100 MG tablet, Take 1 tablet by mouth twice daily, Disp: 180 tablet, Rfl: 0  •  montelukast (Singulair) 10 MG tablet, Take 1 tablet by mouth Every Night., Disp: 90 tablet, Rfl: 1  •  Symbicort 80-4.5 MCG/ACT inhaler, Inhale 2 puffs 2 (Two) Times a Day., Disp: 6.9 g, Rfl: 12  •  vitamin C (ASCORBIC ACID) 250 MG tablet, Take 250 mg by mouth 2 (Two) Times a Day., Disp: , Rfl:     Patient Active Problem List   Diagnosis   • Seasonal allergies   • Acute UTI   • Congestive heart failure (HCC)   • Nonrheumatic mitral valve regurgitation   • Nonrheumatic tricuspid valve regurgitation   • Sick sinus syndrome (HCC)   • Cardiac pacemaker in situ   • Atrial fibrillation (HCC)   • Gout   • Chronic kidney disease   • Moderate persistent asthma without complication   • Complications, mechanical, pacemaker, cardiac   • Class 2 obesity due to excess calories without serious comorbidity with body mass index (BMI) of 38.0 to 38.9 in adult     Past Surgical History:   Procedure Laterality Date   • BLADDER SURGERY     • CARDIAC ELECTROPHYSIOLOGY PROCEDURE N/A 2022    Procedure: PPM generator change - dual;  Surgeon: Shiloh Medrano MD;  Location: [facility] INVASIVE LOCATION;  Service: Cardiology;  Laterality: N/A;   • CARDIAC SURGERY     • COLON RESECTION      x 2 small bowel   • HYSTERECTOMY     • PACEMAKER IMPLANTATION     • VAGINAL BIOPSY       Social History     Socioeconomic History   • Marital status:    Tobacco Use   • Smoking status: Former     Packs/day: 2.00     Years: 45.00     Pack years: 90.00     Types: Cigarettes     Start date: 3/12/1953     Quit date: 3/12/1993     Years since quittin.9   • Smokeless tobacco: Never   Vaping Use   • Vaping Use: Never used   Substance and Sexual Activity    • Alcohol use: Not Currently   • Drug use: Never   • Sexual activity: Defer     Family History   Problem Relation Age of Onset   • Cancer Mother    • Diabetes Father    • Diabetes Sister    • Diabetes Brother      Lab on 01/16/2023   Component Date Value Ref Range Status   • TSH 01/16/2023 1.050  0.270 - 4.200 uIU/mL Final   Admission on 12/31/2022, Discharged on 12/31/2022   Component Date Value Ref Range Status   • QT Interval 12/31/2022 444  ms Final   • QTC Interval 12/31/2022 444  ms Final   • COVID19 12/31/2022 Not Detected  Not Detected - Ref. Range Final   • Influenza A PCR 12/31/2022 Not Detected  Not Detected Final   • Influenza B PCR 12/31/2022 Not Detected  Not Detected Final   • Glucose 12/31/2022 105 (H)  65 - 99 mg/dL Final   • BUN 12/31/2022 18  8 - 23 mg/dL Final   • Creatinine 12/31/2022 1.44 (H)  0.57 - 1.00 mg/dL Final   • Sodium 12/31/2022 140  136 - 145 mmol/L Final   • Potassium 12/31/2022 5.1  3.5 - 5.2 mmol/L Final   • Chloride 12/31/2022 106  98 - 107 mmol/L Final   • CO2 12/31/2022 23.0  22.0 - 29.0 mmol/L Final   • Calcium 12/31/2022 9.6  8.6 - 10.5 mg/dL Final   • Total Protein 12/31/2022 6.3  6.0 - 8.5 g/dL Final   • Albumin 12/31/2022 3.6  3.5 - 5.2 g/dL Final   • ALT (SGPT) 12/31/2022 13  1 - 33 U/L Final   • AST (SGOT) 12/31/2022 25  1 - 32 U/L Final   • Alkaline Phosphatase 12/31/2022 83  39 - 117 U/L Final   • Total Bilirubin 12/31/2022 0.6  0.0 - 1.2 mg/dL Final   • Globulin 12/31/2022 2.7  gm/dL Final   • A/G Ratio 12/31/2022 1.3  g/dL Final   • BUN/Creatinine Ratio 12/31/2022 12.5  7.0 - 25.0 Final   • Anion Gap 12/31/2022 11.0  5.0 - 15.0 mmol/L Final   • eGFR 12/31/2022 37.1 (L)  >60.0 mL/min/1.73 Final    National Kidney Foundation and American Society of Nephrology (ASN) Task Force recommended calculation based on the Chronic Kidney Disease Epidemiology Collaboration (CKD-EPI) equation refit without adjustment for race.   • proBNP 12/31/2022 562.9  0.0 - 1,800.0 pg/mL  Final   • WBC 12/31/2022 9.23  3.40 - 10.80 10*3/mm3 Final   • RBC 12/31/2022 4.63  3.77 - 5.28 10*6/mm3 Final   • Hemoglobin 12/31/2022 13.6  12.0 - 15.9 g/dL Final   • Hematocrit 12/31/2022 40.8  34.0 - 46.6 % Final   • MCV 12/31/2022 88.1  79.0 - 97.0 fL Final   • MCH 12/31/2022 29.4  26.6 - 33.0 pg Final   • MCHC 12/31/2022 33.3  31.5 - 35.7 g/dL Final   • RDW 12/31/2022 13.1  12.3 - 15.4 % Final   • RDW-SD 12/31/2022 41.1  37.0 - 54.0 fl Final   • MPV 12/31/2022 9.2  6.0 - 12.0 fL Final   • Platelets 12/31/2022 218  140 - 450 10*3/mm3 Final   • Neutrophil % 12/31/2022 41.7 (L)  42.7 - 76.0 % Final   • Lymphocyte % 12/31/2022 42.7  19.6 - 45.3 % Final   • Monocyte % 12/31/2022 10.1  5.0 - 12.0 % Final   • Eosinophil % 12/31/2022 4.2  0.3 - 6.2 % Final   • Basophil % 12/31/2022 1.1  0.0 - 1.5 % Final   • Immature Grans % 12/31/2022 0.2  0.0 - 0.5 % Final   • Neutrophils, Absolute 12/31/2022 3.85  1.70 - 7.00 10*3/mm3 Final   • Lymphocytes, Absolute 12/31/2022 3.94 (H)  0.70 - 3.10 10*3/mm3 Final   • Monocytes, Absolute 12/31/2022 0.93 (H)  0.10 - 0.90 10*3/mm3 Final   • Eosinophils, Absolute 12/31/2022 0.39  0.00 - 0.40 10*3/mm3 Final   • Basophils, Absolute 12/31/2022 0.10  0.00 - 0.20 10*3/mm3 Final   • Immature Grans, Absolute 12/31/2022 0.02  0.00 - 0.05 10*3/mm3 Final   • nRBC 12/31/2022 0.0  0.0 - 0.2 /100 WBC Final   Admission on 09/30/2022, Discharged on 09/30/2022   Component Date Value Ref Range Status   • QT Interval 09/30/2022 432  ms Final   • QTC Interval 09/30/2022 442  ms Final   • COVID19 09/30/2022 Not Detected  Not Detected - Ref. Range Final   • Influenza A PCR 09/30/2022 Not Detected  Not Detected Final   • Influenza B PCR 09/30/2022 Not Detected  Not Detected Final   • Glucose 09/30/2022 125 (H)  65 - 99 mg/dL Final   • BUN 09/30/2022 34 (H)  8 - 23 mg/dL Final   • Creatinine 09/30/2022 1.50 (H)  0.57 - 1.00 mg/dL Final   • Sodium 09/30/2022 140  136 - 145 mmol/L Final   • Potassium  09/30/2022 4.3  3.5 - 5.2 mmol/L Final    Slight hemolysis detected by analyzer. Results may be affected.   • Chloride 09/30/2022 104  98 - 107 mmol/L Final   • CO2 09/30/2022 26.0  22.0 - 29.0 mmol/L Final   • Calcium 09/30/2022 9.8  8.6 - 10.5 mg/dL Final   • Total Protein 09/30/2022 6.2  6.0 - 8.5 g/dL Final   • Albumin 09/30/2022 3.90  3.50 - 5.20 g/dL Final   • ALT (SGPT) 09/30/2022 16  1 - 33 U/L Final   • AST (SGOT) 09/30/2022 19  1 - 32 U/L Final   • Alkaline Phosphatase 09/30/2022 92  39 - 117 U/L Final   • Total Bilirubin 09/30/2022 0.3  0.0 - 1.2 mg/dL Final   • Globulin 09/30/2022 2.3  gm/dL Final   • A/G Ratio 09/30/2022 1.7  g/dL Final   • BUN/Creatinine Ratio 09/30/2022 22.7  7.0 - 25.0 Final   • Anion Gap 09/30/2022 10.0  5.0 - 15.0 mmol/L Final   • eGFR 09/30/2022 35.3 (L)  >60.0 mL/min/1.73 Final    National Kidney Foundation and American Society of Nephrology (ASN) Task Force recommended calculation based on the Chronic Kidney Disease Epidemiology Collaboration (CKD-EPI) equation refit without adjustment for race.   • proBNP 09/30/2022 382.2  0.0 - 1,800.0 pg/mL Final   • Troponin T 09/30/2022 <0.010  0.000 - 0.030 ng/mL Final   • Magnesium 09/30/2022 2.0  1.6 - 2.4 mg/dL Final   • WBC 09/30/2022 19.22 (H)  3.40 - 10.80 10*3/mm3 Final   • RBC 09/30/2022 4.75  3.77 - 5.28 10*6/mm3 Final   • Hemoglobin 09/30/2022 14.0  12.0 - 15.9 g/dL Final   • Hematocrit 09/30/2022 42.8  34.0 - 46.6 % Final   • MCV 09/30/2022 90.1  79.0 - 97.0 fL Final   • MCH 09/30/2022 29.5  26.6 - 33.0 pg Final   • MCHC 09/30/2022 32.7  31.5 - 35.7 g/dL Final   • RDW 09/30/2022 13.3  12.3 - 15.4 % Final   • RDW-SD 09/30/2022 43.9  37.0 - 54.0 fl Final   • MPV 09/30/2022 9.5  6.0 - 12.0 fL Final   • Platelets 09/30/2022 290  140 - 450 10*3/mm3 Final   • Neutrophil % 09/30/2022 62.0  42.7 - 76.0 % Final   • Lymphocyte % 09/30/2022 27.8  19.6 - 45.3 % Final   • Monocyte % 09/30/2022 8.1  5.0 - 12.0 % Final   • Eosinophil %  09/30/2022 0.2 (L)  0.3 - 6.2 % Final   • Basophil % 09/30/2022 0.4  0.0 - 1.5 % Final   • Immature Grans % 09/30/2022 1.5 (H)  0.0 - 0.5 % Final   • Neutrophils, Absolute 09/30/2022 11.92 (H)  1.70 - 7.00 10*3/mm3 Final   • Lymphocytes, Absolute 09/30/2022 5.35 (H)  0.70 - 3.10 10*3/mm3 Final   • Monocytes, Absolute 09/30/2022 1.56 (H)  0.10 - 0.90 10*3/mm3 Final   • Eosinophils, Absolute 09/30/2022 0.03  0.00 - 0.40 10*3/mm3 Final   • Basophils, Absolute 09/30/2022 0.08  0.00 - 0.20 10*3/mm3 Final   • Immature Grans, Absolute 09/30/2022 0.28 (H)  0.00 - 0.05 10*3/mm3 Final   • nRBC 09/30/2022 0.0  0.0 - 0.2 /100 WBC Final   • Extra Tube 09/30/2022 Hold for add-ons.   Final    Auto resulted.   • Extra Tube 09/30/2022 Hold for add-ons.   Final    Auto resulted   Admission on 09/24/2022, Discharged on 09/24/2022   Component Date Value Ref Range Status   • QT Interval 09/24/2022 436  ms Final   • QTC Interval 09/24/2022 436  ms Final   • Glucose 09/24/2022 122 (H)  65 - 99 mg/dL Final   • BUN 09/24/2022 13  8 - 23 mg/dL Final   • Creatinine 09/24/2022 1.35 (H)  0.57 - 1.00 mg/dL Final   • Sodium 09/24/2022 141  136 - 145 mmol/L Final   • Potassium 09/24/2022 4.4  3.5 - 5.2 mmol/L Final   • Chloride 09/24/2022 106  98 - 107 mmol/L Final   • CO2 09/24/2022 27.0  22.0 - 29.0 mmol/L Final   • Calcium 09/24/2022 9.3  8.6 - 10.5 mg/dL Final   • Total Protein 09/24/2022 6.1  6.0 - 8.5 g/dL Final   • Albumin 09/24/2022 3.90  3.50 - 5.20 g/dL Final   • ALT (SGPT) 09/24/2022 13  1 - 33 U/L Final   • AST (SGOT) 09/24/2022 20  1 - 32 U/L Final   • Alkaline Phosphatase 09/24/2022 85  39 - 117 U/L Final   • Total Bilirubin 09/24/2022 0.5  0.0 - 1.2 mg/dL Final   • Globulin 09/24/2022 2.2  gm/dL Final   • A/G Ratio 09/24/2022 1.8  g/dL Final   • BUN/Creatinine Ratio 09/24/2022 9.6  7.0 - 25.0 Final   • Anion Gap 09/24/2022 8.0  5.0 - 15.0 mmol/L Final   • eGFR 09/24/2022 40.1 (L)  >60.0 mL/min/1.73 Final    National Kidney  Foundation and American Society of Nephrology (ASN) Task Force recommended calculation based on the Chronic Kidney Disease Epidemiology Collaboration (CKD-EPI) equation refit without adjustment for race.   • proBNP 09/24/2022 1,006.0  0.0 - 1,800.0 pg/mL Final   • Troponin T 09/24/2022 <0.010  0.000 - 0.030 ng/mL Final   • Extra Tube 09/24/2022 Hold for add-ons.   Final    Auto resulted.   • Extra Tube 09/24/2022 hold for add-on   Final    Auto resulted   • Extra Tube 09/24/2022 Hold for add-ons.   Final    Auto resulted.   • Extra Tube 09/24/2022 Hold for add-ons.   Final    Auto resulted   • WBC 09/24/2022 11.44 (H)  3.40 - 10.80 10*3/mm3 Final   • RBC 09/24/2022 4.38  3.77 - 5.28 10*6/mm3 Final   • Hemoglobin 09/24/2022 13.4  12.0 - 15.9 g/dL Final   • Hematocrit 09/24/2022 39.6  34.0 - 46.6 % Final   • MCV 09/24/2022 90.4  79.0 - 97.0 fL Final   • MCH 09/24/2022 30.6  26.6 - 33.0 pg Final   • MCHC 09/24/2022 33.8  31.5 - 35.7 g/dL Final   • RDW 09/24/2022 13.6  12.3 - 15.4 % Final   • RDW-SD 09/24/2022 45.1  37.0 - 54.0 fl Final   • MPV 09/24/2022 9.4  6.0 - 12.0 fL Final   • Platelets 09/24/2022 266  140 - 450 10*3/mm3 Final   • Neutrophil % 09/24/2022 46.1  42.7 - 76.0 % Final   • Lymphocyte % 09/24/2022 38.5  19.6 - 45.3 % Final   • Monocyte % 09/24/2022 5.9  5.0 - 12.0 % Final   • Eosinophil % 09/24/2022 8.4 (H)  0.3 - 6.2 % Final   • Basophil % 09/24/2022 0.7  0.0 - 1.5 % Final   • Immature Grans % 09/24/2022 0.4  0.0 - 0.5 % Final   • Neutrophils, Absolute 09/24/2022 5.26  1.70 - 7.00 10*3/mm3 Final   • Lymphocytes, Absolute 09/24/2022 4.41 (H)  0.70 - 3.10 10*3/mm3 Final   • Monocytes, Absolute 09/24/2022 0.68  0.10 - 0.90 10*3/mm3 Final   • Eosinophils, Absolute 09/24/2022 0.96 (H)  0.00 - 0.40 10*3/mm3 Final   • Basophils, Absolute 09/24/2022 0.08  0.00 - 0.20 10*3/mm3 Final   • Immature Grans, Absolute 09/24/2022 0.05  0.00 - 0.05 10*3/mm3 Final   • nRBC 09/24/2022 0.0  0.0 - 0.2 /100 WBC Final   •  Extra Tube 09/24/2022 Hold for add-ons.   Final    Auto resulted.   • COVID19 09/24/2022 Not Detected  Not Detected - Ref. Range Final   • Influenza A PCR 09/24/2022 Not Detected  Not Detected Final   • Influenza B PCR 09/24/2022 Not Detected  Not Detected Final      US Thyroid  Narrative: PROCEDURE: Neck sonogram    COMPARISON: None    HISTORY: thyroid nodule, E04.1 Nontoxic single thyroid nodule.    FINDINGS: Realtime grayscale and color-flow imaging is obtained  of the thyroid gland. The thyroid is normal in size with uniform  echotexture. The right lobe measures 4 x 1.8 x 1.2 cm. The left  lobe measures 3.4 x 1.6 x 1.7 cm.    Multiple bilateral thyroid nodules are seen.  In the right lobe there is a 1.3 x 1.1 x 1.4 cm nodule that  appears cystic and solid. The solid component is isoechoic to  surrounding thyroid parenchyma. This has a benign appearance.  In the inferior right lobe there is a 1.3 x 1.1 x 1.2 cm nodule  that appears solid, isoechoic, and contains internal punctate  echogenic foci.  There is a hypoechoic area inferior and lateral to the right lobe  of the thyroid measuring 1.9 x 1 x 1.7 cm which appears solid and  contains punctate echogenic foci, suspicious for a thyroid  nodule.  In the left lobe inferior aspect there is a tiny hypoechoic solid  nodule measuring 0.8 x 0.8 x 0.5 cm.  Impression: CONCLUSION:    1.  In the inferior right lobe there is a 1.3 x 1.1 x 1.2 cm  nodule that appears solid, isoechoic, and contains internal  punctate echogenic foci. Recommend fine-needle aspiration.  2.  There is a hypoechoic area inferior and lateral to the right  lobe of the thyroid measuring 1.9 x 1 x 1.7 cm which appears  solid and contains punctate echogenic foci, suspicious for a  thyroid nodule. Recommend fine-needle aspiration.  3.  In the left lobe inferior aspect there is a tiny hypoechoic  nodule measuring 0.8 x 0.8 x 0.5 cm. Recommend follow-up  ultrasound in one year.    Electronically signed  by:  Yaniv Newell MD  1/26/2023 9:37 AM CST  Workstation: [id number]    [email or web address]  Immunization History   Administered Date(s) Administered   • COVID-19 (MODERNA) 1st, 2nd, 3rd Dose Only 09/25/2021, 10/23/2021   • Fluzone High-Dose 65+yrs 12/20/2021   • Influenza, Unspecified 01/10/2018   • Pneumococcal Conjugate 20-Valent (PCV20) 02/16/2023   • Pneumococcal Polysaccharide (PPSV23) 02/07/2022   • Pneumococcal, Unspecified 01/01/2013   • Tdap 12/20/2021     The following portions of the patient's history were reviewed and updated as appropriate: allergies, current medications, past family history, past medical history, past social history, past surgical history and problem list.    PHQ-9 Total Score:           Physical Exam  Constitutional:       Appearance: Normal appearance.   HENT:      Head: Normocephalic and atraumatic.      Right Ear: External ear normal.      Left Ear: External ear normal.   Eyes:      General:         Right eye: No discharge.         Left eye: No discharge.      Conjunctiva/sclera: Conjunctivae normal.   Cardiovascular:      Rate and Rhythm: Normal rate and regular rhythm.      Pulses: Normal pulses.      Heart sounds: Normal heart sounds. No murmur heard.  Pulmonary:      Effort: Pulmonary effort is normal. No respiratory distress.      Breath sounds: Normal breath sounds.   Abdominal:      General: There is no distension.      Palpations: Abdomen is soft.      Tenderness: There is no abdominal tenderness.   Musculoskeletal:      Cervical back: Normal range of motion.      Right lower leg: No edema.      Left lower leg: No edema.   Lymphadenopathy:      Cervical: No cervical adenopathy.   Neurological:      Mental Status: She is alert. Mental status is at baseline.   Psychiatric:         Mood and Affect: Mood normal.         Behavior: Behavior normal.       Assessment & Plan    Diagnosis Plan   1. Shortness of breath        2. Need for vaccination  Pneumococcal Conjugate Vaccine  20-Valent All         Orders Placed This Encounter   Procedures   • Pneumococcal Conjugate Vaccine 20-Valent All      Shortness of breath; has appropriate follow-up with cardiology, pulmonology.  Symptoms improved with physical therapy, home health, underlying etiology likely due to deconditioning.  Advised patient and daughter importance of exercise, continue to follow-up with pulmonology, cardiology, to rule out alternative etiology, exam benign, vital stable, strict ER/return precaution given.         This document has been electronically signed by Isaac Love MD on February 16, 2023 18:03 CST    EMR Dragon/Transciption Disclaimer: Some of this note may be an electronic transcription/translation of spoken language to printed text.  The electronic translation of spoken language may permit erroneous, or at times, nonsensical words or phrases to be inadvertently transcribed. Although I have reviewed the note for such errors, some may still exist.

## 2023-02-23 ENCOUNTER — OFFICE VISIT (OUTPATIENT)
Dept: FAMILY MEDICINE CLINIC | Facility: CLINIC | Age: 78
End: 2023-02-23
Payer: MEDICARE

## 2023-02-23 VITALS
WEIGHT: 190 LBS | DIASTOLIC BLOOD PRESSURE: 62 MMHG | BODY MASS INDEX: 37.3 KG/M2 | HEIGHT: 60 IN | TEMPERATURE: 100.8 F | SYSTOLIC BLOOD PRESSURE: 120 MMHG | OXYGEN SATURATION: 95 % | HEART RATE: 62 BPM

## 2023-02-23 DIAGNOSIS — U07.1 COVID: Primary | ICD-10-CM

## 2023-02-23 DIAGNOSIS — R50.9 FEVER, UNSPECIFIED FEVER CAUSE: ICD-10-CM

## 2023-02-23 LAB
EXPIRATION DATE: ABNORMAL
FLUAV AG UPPER RESP QL IA.RAPID: NOT DETECTED
FLUBV AG UPPER RESP QL IA.RAPID: NOT DETECTED
INTERNAL CONTROL: ABNORMAL
Lab: ABNORMAL
SARS-COV-2 AG UPPER RESP QL IA.RAPID: DETECTED

## 2023-02-23 PROCEDURE — 93296 REM INTERROG EVL PM/IDS: CPT | Performed by: INTERNAL MEDICINE

## 2023-02-23 PROCEDURE — 93294 REM INTERROG EVL PM/LDLS PM: CPT | Performed by: INTERNAL MEDICINE

## 2023-02-23 PROCEDURE — 87428 SARSCOV & INF VIR A&B AG IA: CPT | Performed by: NURSE PRACTITIONER

## 2023-02-23 PROCEDURE — 99213 OFFICE O/P EST LOW 20 MIN: CPT | Performed by: NURSE PRACTITIONER

## 2023-02-23 RX ORDER — BENZONATATE 100 MG/1
CAPSULE ORAL
Qty: 30 CAPSULE | Refills: 1 | OUTPATIENT
Start: 2023-02-23 | End: 2023-03-18

## 2023-02-23 NOTE — PROGRESS NOTES
"Chief Complaint  Illness (Cough, chills, and fever (100) X 3 days.)    Subjective          Pennie Gibson presents to [facility] - [place]    History of Present Illness  [facility]    PCP: Dr. Love    CC: \"cough, chills, fever, mild shortness of breath\"    Brought in by daughter whom she lives with and is also being seen today for same symptoms.  Patient hard of hearing, most of HPI obtained from daughter.  Symptoms beginning on 2-.  Taking Mucinex, Tylenol and using albuterol inhaler PRN.  Hx of asthma.      Illness  This is a new problem. Episode onset: 2-20-23. The problem occurs constantly. The problem has been unchanged. Associated symptoms include chills, congestion, coughing ( dry), fatigue, a fever, headaches and myalgias. Pertinent negatives include no abdominal pain, anorexia, arthralgias, change in bowel habit, chest pain, diaphoresis, joint swelling, nausea, neck pain, numbness, rash, sore throat, swollen glands, urinary symptoms, vertigo, visual change, vomiting or weakness. The symptoms are aggravated by coughing. Treatments tried: tylenol, mucinex, rest. The treatment provided mild relief.       Review of Systems   Constitutional: Positive for appetite change, chills, fatigue and fever. Negative for diaphoresis.   HENT: Positive for congestion, hearing loss (chronic), rhinorrhea and sinus pressure. Negative for ear discharge, ear pain, sneezing, sore throat and trouble swallowing.    Eyes: Negative.    Respiratory: Positive for cough ( dry) and shortness of breath ( mild). Negative for chest tightness and wheezing.    Cardiovascular: Negative.  Negative for chest pain.   Gastrointestinal: Negative.  Negative for abdominal pain, anorexia, change in bowel habit, nausea and vomiting.   Genitourinary: Negative.    Musculoskeletal: Positive for myalgias. Negative for arthralgias, joint swelling and neck pain.   Skin: Negative.  " "Negative for rash.   Neurological: Positive for headaches. Negative for dizziness, vertigo, weakness and numbness.        Objective   Vital Signs:   /62 (BP Location: Left arm, Patient Position: Sitting, Cuff Size: Large Adult)   Pulse 62   Temp (!) 100.8 °F (38.2 °C) (Oral)   Ht 152.4 cm (60\")   Wt 86.2 kg (190 lb)   SpO2 95%   BMI 37.11 kg/m²       Physical Exam  Vitals and nursing note reviewed.   Constitutional:       General: She is not in acute distress.     Appearance: She is ill-appearing.   HENT:      Head: Normocephalic and atraumatic.      Right Ear: Tympanic membrane and ear canal normal. Decreased hearing noted.      Left Ear: Tympanic membrane and ear canal normal. Decreased hearing noted.      Nose: Congestion present.      Right Sinus: No maxillary sinus tenderness or frontal sinus tenderness.      Left Sinus: No maxillary sinus tenderness or frontal sinus tenderness.      Comments: Generalized sinus pressure, no localized pain       Mouth/Throat:      Mouth: Mucous membranes are moist.      Pharynx: Posterior oropharyngeal erythema (mild injection with PND) present. No pharyngeal swelling or oropharyngeal exudate.   Eyes:      General:         Right eye: No discharge.         Left eye: No discharge.      Conjunctiva/sclera: Conjunctivae normal.   Cardiovascular:      Rate and Rhythm: Normal rate and regular rhythm.   Pulmonary:      Effort: Pulmonary effort is normal. No respiratory distress.      Breath sounds: No wheezing, rhonchi or rales.      Comments: Slightly decreased aeration with dry, tight cough  Musculoskeletal:      Cervical back: Neck supple. No tenderness.   Lymphadenopathy:      Cervical: No cervical adenopathy.   Skin:     General: Skin is warm and dry.   Neurological:      General: No focal deficit present.      Mental Status: She is alert and oriented to person, place, and time.   Psychiatric:         Mood and Affect: Mood normal.         Thought Content: Thought " content normal.          Result Review :     Common labs    Common Labs 9/24/22 9/24/22 9/30/22 9/30/22 12/31/22 12/31/22    1249 1249 1954 1954 1330 1330   Glucose  122 (A)  125 (A)  105 (A)   BUN  13  34 (A)  18   Creatinine  1.35 (A)  1.50 (A)  1.44 (A)   Sodium  141  140  140   Potassium  4.4  4.3  5.1   Chloride  106  104  106   Calcium  9.3  9.8  9.6   Albumin  3.90  3.90  3.6   Total Bilirubin  0.5  0.3  0.6   Alkaline Phosphatase  85  92  83   AST (SGOT)  20  19  25   ALT (SGPT)  13  16  13   WBC 11.44 (A)  19.22 (A)  9.23    Hemoglobin 13.4  14.0  13.6    Hematocrit 39.6  42.8  40.8    Platelets 266  290  218    (A) Abnormal value       Comments are available for some flowsheets but are not being displayed.           Recent Results (from the past 24 hour(s))   POCT SARS-CoV-2 Antigen RAUL + Flu    Collection Time: 02/23/23  1:49 PM    Specimen: Swab   Result Value Ref Range    SARS Antigen Detected (A) Not Detected, Presumptive Negative    Influenza A Antigen RAUL Not Detected Not Detected    Influenza B Antigen RAUL Not Detected Not Detected    Internal Control Passed Passed    Lot Number [id number]     Expiration Date 03/11/2023                  Assessment and Plan    Diagnoses and all orders for this visit:    1. COVID (Primary)  -     benzonatate (Tessalon Perles) 100 MG capsule; 1-2 caps po TID prn cough  Dispense: 30 capsule; Refill: 1  -     Molnupiravir (LAGEVRIO) 200 MG capsule; Take 4 capsules by mouth Every 12 (Twelve) Hours for 5 days.  Dispense: 40 capsule; Refill: 0    2. Fever, unspecified fever cause  -     POCT SARS-CoV-2 Antigen RAUL + Flu    Push fluids  Rest  Tylenol as needed  Risks/benefits/EUA discussed--meets high risk criteria--wishes to proceed with anitviral, on eliquis, Rx for molnupiravir provided.   Rx for Tessalon perles PRN cough  Continue with Albuterol PRN   Standard precautions to prevent spread of infection  Possible complications of Covid and when to seek further treatment  discussed  Quarantine instructions given.   [facility]    See PCP or RTC if symptoms persist/worsen  See PCP for routine f/u visit and management of chronic medical conditions      This document has been electronically signed by DEN Pressley on February 23, 2023 17:54 CST,.

## 2023-03-06 DIAGNOSIS — M1A.9XX0 CHRONIC GOUT WITHOUT TOPHUS, UNSPECIFIED CAUSE, UNSPECIFIED SITE: ICD-10-CM

## 2023-03-06 RX ORDER — ALLOPURINOL 100 MG/1
TABLET ORAL
Qty: 90 TABLET | Refills: 0 | Status: SHIPPED | OUTPATIENT
Start: 2023-03-06

## 2023-03-06 RX ORDER — FLUCONAZOLE 150 MG/1
TABLET ORAL
Qty: 2 TABLET | Refills: 0 | Status: SHIPPED | OUTPATIENT
Start: 2023-03-06 | End: 2023-04-04

## 2023-03-06 RX ORDER — AZITHROMYCIN 250 MG/1
TABLET, FILM COATED ORAL
Qty: 6 TABLET | Refills: 0 | OUTPATIENT
Start: 2023-03-06 | End: 2023-03-18

## 2023-03-06 NOTE — TELEPHONE ENCOUNTER
Rx Refill Note  Requested Prescriptions     Pending Prescriptions Disp Refills   • allopurinol (ZYLOPRIM) 100 MG tablet [Pharmacy Med Name: Allopurinol 100 MG Oral Tablet] 90 tablet 0     Sig: Take 1 tablet by mouth once daily      Last office visit with prescribing clinician: 2/16/2023   Last telemedicine visit with prescribing clinician: 5/19/2023   Next office visit with prescribing clinician: 5/19/2023   {TIP  Encounters:     Rx #: [id number]    Gout Agent Protocol Failed 03/06/2023 04:30 AM   Protocol Details  Creatinine < 1.3 in past 12 months            {TIP  Please add Last Relevant Lab Date if appropriate             {TIP  Is Refill Pharmacy correct? YES    Would you like a call back once the refill request has been completed: [] Yes [] No    If the office needs to give you a call back, can they leave a voicemail: [] Yes [] No    Shruthi Devine LPN  03/06/23, 08:31 CST

## 2023-03-18 PROBLEM — U07.1 ACUTE BRONCHITIS DUE TO COVID-19 VIRUS: Status: ACTIVE | Noted: 2023-03-18

## 2023-03-18 PROBLEM — J20.8 ACUTE BRONCHITIS DUE TO COVID-19 VIRUS: Status: ACTIVE | Noted: 2023-03-18

## 2023-03-30 RX ORDER — APIXABAN 5 MG/1
TABLET, FILM COATED ORAL
Qty: 180 TABLET | Refills: 0 | Status: SHIPPED | OUTPATIENT
Start: 2023-03-30

## 2023-04-04 ENCOUNTER — OFFICE VISIT (OUTPATIENT)
Dept: FAMILY MEDICINE CLINIC | Facility: CLINIC | Age: 78
End: 2023-04-04
Payer: MEDICARE

## 2023-04-04 VITALS
WEIGHT: 195 LBS | SYSTOLIC BLOOD PRESSURE: 110 MMHG | HEIGHT: 61 IN | BODY MASS INDEX: 36.82 KG/M2 | DIASTOLIC BLOOD PRESSURE: 60 MMHG | OXYGEN SATURATION: 94 % | TEMPERATURE: 98 F | HEART RATE: 66 BPM

## 2023-04-04 DIAGNOSIS — J01.10 ACUTE NON-RECURRENT FRONTAL SINUSITIS: ICD-10-CM

## 2023-04-04 DIAGNOSIS — R06.02 SHORTNESS OF BREATH: Primary | ICD-10-CM

## 2023-04-04 PROCEDURE — 99213 OFFICE O/P EST LOW 20 MIN: CPT | Performed by: STUDENT IN AN ORGANIZED HEALTH CARE EDUCATION/TRAINING PROGRAM

## 2023-04-04 PROCEDURE — 1159F MED LIST DOCD IN RCRD: CPT | Performed by: STUDENT IN AN ORGANIZED HEALTH CARE EDUCATION/TRAINING PROGRAM

## 2023-04-04 PROCEDURE — 1160F RVW MEDS BY RX/DR IN RCRD: CPT | Performed by: STUDENT IN AN ORGANIZED HEALTH CARE EDUCATION/TRAINING PROGRAM

## 2023-04-04 RX ORDER — DOXYCYCLINE HYCLATE 100 MG/1
100 CAPSULE ORAL 2 TIMES DAILY
Qty: 10 CAPSULE | Refills: 0 | Status: SHIPPED | OUTPATIENT
Start: 2023-04-04 | End: 2023-04-23

## 2023-04-04 NOTE — PROGRESS NOTES
"Subjective:  Pennie Gibson is a 78 y.o. female who presents for     Shortness of air; has been intermittent issue for several months, has been seen by pulmonology, cardiology.  Diagnosed with moderate persistent asthma, started on Symbicort, Allegra, albuterol as needed.  Unfortunately, was diagnosed with COVID on 2/23/2023 treated with will antiviral, fail to improve and was given azithromycin, still failed to improve and went to urgent care on 3/18/2023 where she was given 10 mg prednisone for 7 days, albuterol.  Patient states that she has been experiencing a lot of fatigue, shortness of breath, sore throat.  Does admit to nonadherence with inhaler.    Patient Active Problem List   Diagnosis   • Seasonal allergies   • Acute UTI   • Congestive heart failure   • Nonrheumatic mitral valve regurgitation   • Nonrheumatic tricuspid valve regurgitation   • Sick sinus syndrome   • Cardiac pacemaker in situ   • Atrial fibrillation   • Gout   • Chronic kidney disease   • Moderate persistent asthma without complication   • Complications, mechanical, pacemaker, cardiac   • Class 2 obesity due to excess calories without serious comorbidity with body mass index (BMI) of 38.0 to 38.9 in adult   • Acute bronchitis due to COVID-19 virus     Vitals:    Vitals:    04/04/23 1700   BP: 110/60   BP Location: Right arm   Patient Position: Sitting   Cuff Size: Adult   Pulse: 66   Temp: 98 °F (36.7 °C)   SpO2: 94%   Weight: 88.5 kg (195 lb)   Height: 154.9 cm (61\")     Body mass index is 36.84 kg/m².      Current Outpatient Medications:   •  albuterol sulfate  (90 Base) MCG/ACT inhaler, Inhale 2 puffs Every 4 (Four) Hours As Needed for Wheezing., Disp: 18 g, Rfl: 6  •  allopurinol (ZYLOPRIM) 100 MG tablet, Take 1 tablet by mouth once daily, Disp: 90 tablet, Rfl: 0  •  aspirin (aspirin) 81 MG EC tablet, Take 1 tablet by mouth Daily., Disp: 90 tablet, Rfl: 1  •  atorvastatin (LIPITOR) 20 MG tablet, Take 1 tablet by mouth " once daily, Disp: 90 tablet, Rfl: 0  •  calcium carbonate (OS-MICHELL) 600 MG tablet, Take 1 tablet by mouth Daily., Disp: , Rfl:   •  Cranberry 1000 MG capsule, Take 4,200 mg by mouth Daily., Disp: , Rfl:   •  Cyanocobalamin (VITAMIN B 12 PO), Take 1,000 mcg by mouth Daily., Disp: , Rfl:   •  Eliquis 5 MG tablet tablet, TAKE 1 TABLET BY MOUTH EVERY 12 HOURS, Disp: 180 tablet, Rfl: 0  •  Garlic 1000 MG capsule, Take 1 capsule by mouth Daily., Disp: , Rfl:   •  lisinopril (PRINIVIL,ZESTRIL) 20 MG tablet, Take 0.5 tablets by mouth Daily., Disp: 90 tablet, Rfl: 0  •  metoprolol tartrate (LOPRESSOR) 100 MG tablet, Take 1 tablet by mouth twice daily, Disp: 180 tablet, Rfl: 0  •  montelukast (Singulair) 10 MG tablet, Take 1 tablet by mouth Every Night., Disp: 90 tablet, Rfl: 1  •  Symbicort 80-4.5 MCG/ACT inhaler, Inhale 2 puffs 2 (Two) Times a Day., Disp: 6.9 g, Rfl: 12  •  vitamin C (ASCORBIC ACID) 250 MG tablet, Take 1 tablet by mouth 2 (Two) Times a Day., Disp: , Rfl:   •  albuterol (PROVENTIL) (2.5 MG/3ML) 0.083% nebulizer solution, Take 2.5 mg by nebulization Every 4 (Four) Hours As Needed for Wheezing. (Patient not taking: Reported on 4/4/2023), Disp: 75 mL, Rfl: 0  •  doxycycline (VIBRAMYCIN) 100 MG capsule, Take 1 capsule by mouth 2 (Two) Times a Day., Disp: 10 capsule, Rfl: 0    Patient Active Problem List   Diagnosis   • Seasonal allergies   • Acute UTI   • Congestive heart failure   • Nonrheumatic mitral valve regurgitation   • Nonrheumatic tricuspid valve regurgitation   • Sick sinus syndrome   • Cardiac pacemaker in situ   • Atrial fibrillation   • Gout   • Chronic kidney disease   • Moderate persistent asthma without complication   • Complications, mechanical, pacemaker, cardiac   • Class 2 obesity due to excess calories without serious comorbidity with body mass index (BMI) of 38.0 to 38.9 in adult   • Acute bronchitis due to COVID-19 virus     Past Surgical History:   Procedure Laterality Date   • BLADDER  SURGERY     • CARDIAC ELECTROPHYSIOLOGY PROCEDURE N/A 2022    Procedure: PPM generator change - dual;  Surgeon: Shiloh Medrano MD;  Location: [facility] INVASIVE LOC[facility];  Service: Cardiology;  Laterality: N/A;   • CARDIAC SURGERY     • COLON RESECTION      x 2 small bowel   • HYSTERECTOMY     • PACEMAKER IMPLANTATION     • VAGINAL BIOPSY       Social History     Socioeconomic History   • Marital status:    Tobacco Use   • Smoking status: Former     Packs/day: 2.00     Years: 45.00     Pack years: 90.00     Types: Cigarettes     Start date: 3/12/1953     Quit date: 3/12/1993     Years since quittin.1   • Smokeless tobacco: Never   Vaping Use   • Vaping Use: Never used   Substance and Sexual Activity   • Alcohol use: Not Currently   • Drug use: Never   • Sexual activity: Defer     Family History   Problem Relation Age of Onset   • Cancer Mother    • Diabetes Father    • Diabetes Sister    • Diabetes Brother      Office Visit on 2023   Component Date Value Ref Range Status   • SARS Antigen 2023 Detected (A)  Not Detected, Presumptive Negative Final   • Influenza A Antigen RAUL 2023 Not Detected  Not Detected Final   • Influenza B Antigen RAUL 2023 Not Detected  Not Detected Final   • Internal Control 2023 Passed  Passed Final   • Lot Number 2023 [id number]   Final   • Expiration Date 2023   Final   Lab on 2023   Component Date Value Ref Range Status   • TSH 2023 1.050  0.270 - 4.200 uIU/mL Final   Admission on 2022, Discharged on 2022   Component Date Value Ref Range Status   • QT Interval 2022 444  ms Final   • QTC Interval 2022 444  ms Final   • COVID19 2022 Not Detected  Not Detected - Ref. Range Final   • Influenza A PCR 2022 Not Detected  Not Detected Final   • Influenza B PCR 2022 Not Detected  Not Detected Final   • Glucose 2022 105 (H)  65 - 99 mg/dL Final   • BUN 2022 18  8  - 23 mg/dL Final   • Creatinine 12/31/2022 1.44 (H)  0.57 - 1.00 mg/dL Final   • Sodium 12/31/2022 140  136 - 145 mmol/L Final   • Potassium 12/31/2022 5.1  3.5 - 5.2 mmol/L Final   • Chloride 12/31/2022 106  98 - 107 mmol/L Final   • CO2 12/31/2022 23.0  22.0 - 29.0 mmol/L Final   • Calcium 12/31/2022 9.6  8.6 - 10.5 mg/dL Final   • Total Protein 12/31/2022 6.3  6.0 - 8.5 g/dL Final   • Albumin 12/31/2022 3.6  3.5 - 5.2 g/dL Final   • ALT (SGPT) 12/31/2022 13  1 - 33 U/L Final   • AST (SGOT) 12/31/2022 25  1 - 32 U/L Final   • Alkaline Phosphatase 12/31/2022 83  39 - 117 U/L Final   • Total Bilirubin 12/31/2022 0.6  0.0 - 1.2 mg/dL Final   • Globulin 12/31/2022 2.7  gm/dL Final   • A/G Ratio 12/31/2022 1.3  g/dL Final   • BUN/Creatinine Ratio 12/31/2022 12.5  7.0 - 25.0 Final   • Anion Gap 12/31/2022 11.0  5.0 - 15.0 mmol/L Final   • eGFR 12/31/2022 37.1 (L)  >60.0 mL/min/1.73 Final    National Kidney Foundation and American Society of Nephrology (ASN) Task Force recommended calculation based on the Chronic Kidney Disease Epidemiology Collaboration (CKD-EPI) equation refit without adjustment for race.   • proBNP 12/31/2022 562.9  0.0 - 1,800.0 pg/mL Final   • WBC 12/31/2022 9.23  3.40 - 10.80 10*3/mm3 Final   • RBC 12/31/2022 4.63  3.77 - 5.28 10*6/mm3 Final   • Hemoglobin 12/31/2022 13.6  12.0 - 15.9 g/dL Final   • Hematocrit 12/31/2022 40.8  34.0 - 46.6 % Final   • MCV 12/31/2022 88.1  79.0 - 97.0 fL Final   • MCH 12/31/2022 29.4  26.6 - 33.0 pg Final   • MCHC 12/31/2022 33.3  31.5 - 35.7 g/dL Final   • RDW 12/31/2022 13.1  12.3 - 15.4 % Final   • RDW-SD 12/31/2022 41.1  37.0 - 54.0 fl Final   • MPV 12/31/2022 9.2  6.0 - 12.0 fL Final   • Platelets 12/31/2022 218  140 - 450 10*3/mm3 Final   • Neutrophil % 12/31/2022 41.7 (L)  42.7 - 76.0 % Final   • Lymphocyte % 12/31/2022 42.7  19.6 - 45.3 % Final   • Monocyte % 12/31/2022 10.1  5.0 - 12.0 % Final   • Eosinophil % 12/31/2022 4.2  0.3 - 6.2 % Final   • Basophil %  12/31/2022 1.1  0.0 - 1.5 % Final   • Immature Grans % 12/31/2022 0.2  0.0 - 0.5 % Final   • Neutrophils, Absolute 12/31/2022 3.85  1.70 - 7.00 10*3/mm3 Final   • Lymphocytes, Absolute 12/31/2022 3.94 (H)  0.70 - 3.10 10*3/mm3 Final   • Monocytes, Absolute 12/31/2022 0.93 (H)  0.10 - 0.90 10*3/mm3 Final   • Eosinophils, Absolute 12/31/2022 0.39  0.00 - 0.40 10*3/mm3 Final   • Basophils, Absolute 12/31/2022 0.10  0.00 - 0.20 10*3/mm3 Final   • Immature Grans, Absolute 12/31/2022 0.02  0.00 - 0.05 10*3/mm3 Final   • nRBC 12/31/2022 0.0  0.0 - 0.2 /100 WBC Final      XR Chest 2 View  Narrative: TWO VIEW CHEST    HISTORY: Cough. Shortness of breath. Fatigue. Covid 19.    Frontal and lateral films of the chest were obtained.    COMPARISON: December 31, 2022 exam CT chest January 12, 2023    FINDINGS:    Chronic obstructive pulmonary disease.  No acute infiltrate.  Minimal cardiomegaly.  Left-sided pacemaker remains in place with leads in the right  atrium and right ventricle.  The pulmonary vasculature is not increased.  No pleural effusion.  No pneumothorax.  No acute osseous abnormality.  Degenerative changes are present in the thoracic spine.  Impression: CONCLUSION:  Chronic obstructive pulmonary disease.  No acute infiltrate.  Minimal cardiomegaly.  Left-sided pacemaker remains in place with leads in the right  atrium and right ventricle.    [id number]    Electronically signed by:  Bakari Callejas MD  3/18/2023 1:53 PM CDT  Workstation: [id number]      @[employer]@  Immunization History   Administered Date(s) Administered   • COVID-19 (MODERNA) 1st, 2nd, 3rd Dose Only 09/25/2021, 10/23/2021   • Fluzone High-Dose 65+yrs 12/20/2021   • Influenza, Unspecified 01/10/2018   • Pneumococcal Conjugate 20-Valent (PCV20) 02/16/2023   • Pneumococcal Polysaccharide (PPSV23) 02/07/2022   • Pneumococcal, Unspecified 01/01/2013   • Tdap 12/20/2021     The following portions of the patient's history were reviewed and updated as appropriate:  allergies, current medications, past family history, past medical history, past social history, past surgical history and problem list.    PHQ-9 Total Score:             Physical Exam  Constitutional:       Appearance: Normal appearance.   HENT:      Head: Normocephalic and atraumatic.      Right Ear: External ear normal.      Left Ear: External ear normal.      Nose: Congestion and rhinorrhea present.      Mouth/Throat:      Mouth: Mucous membranes are moist.      Pharynx: Oropharynx is clear. No posterior oropharyngeal erythema.   Eyes:      General:         Right eye: No discharge.         Left eye: No discharge.      Conjunctiva/sclera: Conjunctivae normal.   Cardiovascular:      Rate and Rhythm: Normal rate and regular rhythm.      Pulses: Normal pulses.      Heart sounds: Normal heart sounds. No murmur heard.  Pulmonary:      Effort: Pulmonary effort is normal. No respiratory distress.      Breath sounds: Normal breath sounds.   Abdominal:      General: There is no distension.      Palpations: Abdomen is soft.      Tenderness: There is no abdominal tenderness.   Musculoskeletal:      Cervical back: Normal range of motion.      Right lower leg: No edema.      Left lower leg: No edema.   Lymphadenopathy:      Cervical: No cervical adenopathy.   Neurological:      Mental Status: She is alert. Mental status is at baseline.   Psychiatric:         Mood and Affect: Mood normal.         Behavior: Behavior normal.       Assessment & Plan    Diagnosis Plan   1. Shortness of breath        2. Acute non-recurrent frontal sinusitis  doxycycline (VIBRAMYCIN) 100 MG capsule         No orders of the defined types were placed in this encounter.    Patient with recent viral infection, exam and history consistent with subsequent sinus infection.  After discussion with patient, agreeable to treatment with antibiotics as above, vital stable, lung exam benign, reassuring.  Follow-up in 1 month or sooner if needed        This  document has been electronically signed by Isaac Love MD on April 18, 2023 16:03 CDT    EMR Dragon/Transciption Disclaimer: Some of this note may be an electronic transcription/translation of spoken language to printed text.  The electronic translation of spoken language may permit erroneous, or at times, nonsensical words or phrases to be inadvertently transcribed. Although I have reviewed the note for such errors, some may still exist.

## 2023-04-19 DIAGNOSIS — J44.9 CHRONIC OBSTRUCTIVE PULMONARY DISEASE, UNSPECIFIED COPD TYPE: ICD-10-CM

## 2023-04-19 RX ORDER — ALBUTEROL SULFATE 90 UG/1
2 AEROSOL, METERED RESPIRATORY (INHALATION) EVERY 4 HOURS PRN
Qty: 18 G | Refills: 3 | Status: ON HOLD | OUTPATIENT
Start: 2023-04-19

## 2023-04-23 ENCOUNTER — APPOINTMENT (OUTPATIENT)
Dept: CT IMAGING | Facility: HOSPITAL | Age: 78
End: 2023-04-23
Payer: MEDICARE

## 2023-04-23 ENCOUNTER — APPOINTMENT (OUTPATIENT)
Dept: GENERAL RADIOLOGY | Facility: HOSPITAL | Age: 78
End: 2023-04-23
Payer: MEDICARE

## 2023-04-23 ENCOUNTER — APPOINTMENT (OUTPATIENT)
Dept: ULTRASOUND IMAGING | Facility: HOSPITAL | Age: 78
End: 2023-04-23
Payer: MEDICARE

## 2023-04-23 ENCOUNTER — HOSPITAL ENCOUNTER (OUTPATIENT)
Facility: HOSPITAL | Age: 78
Setting detail: OBSERVATION
Discharge: HOME | End: 2023-04-25
Attending: FAMILY MEDICINE | Admitting: HOSPITALIST
Payer: MEDICARE

## 2023-04-23 DIAGNOSIS — T80.1XXA INTRAVENOUS INFILTRATION, INITIAL ENCOUNTER: ICD-10-CM

## 2023-04-23 DIAGNOSIS — R06.00 DYSPNEA, UNSPECIFIED TYPE: Primary | ICD-10-CM

## 2023-04-23 DIAGNOSIS — R79.89 POSITIVE D DIMER: ICD-10-CM

## 2023-04-23 DIAGNOSIS — Z74.09 IMPAIRED FUNCTIONAL MOBILITY AND ACTIVITY TOLERANCE: ICD-10-CM

## 2023-04-23 LAB
ALBUMIN SERPL-MCNC: 3.6 G/DL (ref 3.5–5.2)
ALBUMIN/GLOB SERPL: 1.5 G/DL
ALP SERPL-CCNC: 85 U/L (ref 39–117)
ALT SERPL W P-5'-P-CCNC: 16 U/L (ref 1–33)
ANION GAP SERPL CALCULATED.3IONS-SCNC: 11 MMOL/L (ref 5–15)
AST SERPL-CCNC: 28 U/L (ref 1–32)
BASOPHILS # BLD AUTO: 0.06 10*3/MM3 (ref 0–0.2)
BASOPHILS NFR BLD AUTO: 0.6 % (ref 0–1.5)
BILIRUB SERPL-MCNC: 0.3 MG/DL (ref 0–1.2)
BUN SERPL-MCNC: 16 MG/DL (ref 8–23)
BUN/CREAT SERPL: 11.8 (ref 7–25)
CALCIUM SPEC-SCNC: 9.4 MG/DL (ref 8.6–10.5)
CHLORIDE SERPL-SCNC: 108 MMOL/L (ref 98–107)
CO2 SERPL-SCNC: 20 MMOL/L (ref 22–29)
CREAT SERPL-MCNC: 1.36 MG/DL (ref 0.57–1)
D-DIMER, QUANTITATIVE (MAD,POW, STR): ABNORMAL NG/ML (FEU) (ref 0–800)
D-LACTATE SERPL-SCNC: 2.2 MMOL/L (ref 0.5–2)
D-LACTATE SERPL-SCNC: 2.5 MMOL/L (ref 0.5–2)
DEPRECATED RDW RBC AUTO: 48 FL (ref 37–54)
EGFRCR SERPLBLD CKD-EPI 2021: 39.5 ML/MIN/1.73
EOSINOPHIL # BLD AUTO: 0.06 10*3/MM3 (ref 0–0.4)
EOSINOPHIL NFR BLD AUTO: 0.6 % (ref 0.3–6.2)
ERYTHROCYTE [DISTWIDTH] IN BLOOD BY AUTOMATED COUNT: 14.6 % (ref 12.3–15.4)
FLUAV SUBTYP SPEC NAA+PROBE: NOT DETECTED
FLUBV RNA ISLT QL NAA+PROBE: NOT DETECTED
GLOBULIN UR ELPH-MCNC: 2.4 GM/DL
GLUCOSE SERPL-MCNC: 186 MG/DL (ref 65–99)
HCT VFR BLD AUTO: 41 % (ref 34–46.6)
HGB BLD-MCNC: 13.4 G/DL (ref 12–15.9)
HOLD SPECIMEN: NORMAL
HOLD SPECIMEN: NORMAL
IMM GRANULOCYTES # BLD AUTO: 0.03 10*3/MM3 (ref 0–0.05)
IMM GRANULOCYTES NFR BLD AUTO: 0.3 % (ref 0–0.5)
LYMPHOCYTES # BLD AUTO: 2.36 10*3/MM3 (ref 0.7–3.1)
LYMPHOCYTES NFR BLD AUTO: 23.1 % (ref 19.6–45.3)
MCH RBC QN AUTO: 29.5 PG (ref 26.6–33)
MCHC RBC AUTO-ENTMCNC: 32.7 G/DL (ref 31.5–35.7)
MCV RBC AUTO: 90.1 FL (ref 79–97)
MONOCYTES # BLD AUTO: 0.5 10*3/MM3 (ref 0.1–0.9)
MONOCYTES NFR BLD AUTO: 4.9 % (ref 5–12)
NEUTROPHILS NFR BLD AUTO: 7.19 10*3/MM3 (ref 1.7–7)
NEUTROPHILS NFR BLD AUTO: 70.5 % (ref 42.7–76)
NRBC BLD AUTO-RTO: 0 /100 WBC (ref 0–0.2)
NT-PROBNP SERPL-MCNC: 916.1 PG/ML (ref 0–1800)
PLATELET # BLD AUTO: 215 10*3/MM3 (ref 140–450)
PMV BLD AUTO: 9.5 FL (ref 6–12)
POTASSIUM SERPL-SCNC: 4.8 MMOL/L (ref 3.5–5.2)
PROT SERPL-MCNC: 6 G/DL (ref 6–8.5)
QT INTERVAL: 442 MS
QTC INTERVAL: 444 MS
RBC # BLD AUTO: 4.55 10*6/MM3 (ref 3.77–5.28)
SARS-COV-2 RNA RESP QL NAA+PROBE: NOT DETECTED
SODIUM SERPL-SCNC: 139 MMOL/L (ref 136–145)
TROPONIN T SERPL HS-MCNC: 10 NG/L
WBC NRBC COR # BLD: 10.2 10*3/MM3 (ref 3.4–10.8)
WHOLE BLOOD HOLD COAG: NORMAL
WHOLE BLOOD HOLD SPECIMEN: NORMAL

## 2023-04-23 PROCEDURE — 99284 EMERGENCY DEPT VISIT MOD MDM: CPT

## 2023-04-23 PROCEDURE — 71250 CT THORAX DX C-: CPT

## 2023-04-23 PROCEDURE — 80053 COMPREHEN METABOLIC PANEL: CPT | Performed by: FAMILY MEDICINE

## 2023-04-23 PROCEDURE — G0378 HOSPITAL OBSERVATION PER HR: HCPCS

## 2023-04-23 PROCEDURE — 83880 ASSAY OF NATRIURETIC PEPTIDE: CPT | Performed by: FAMILY MEDICINE

## 2023-04-23 PROCEDURE — 36415 COLL VENOUS BLD VENIPUNCTURE: CPT | Performed by: FAMILY MEDICINE

## 2023-04-23 PROCEDURE — 93970 EXTREMITY STUDY: CPT

## 2023-04-23 PROCEDURE — 71046 X-RAY EXAM CHEST 2 VIEWS: CPT

## 2023-04-23 PROCEDURE — 93005 ELECTROCARDIOGRAM TRACING: CPT | Performed by: FAMILY MEDICINE

## 2023-04-23 PROCEDURE — 83605 ASSAY OF LACTIC ACID: CPT | Performed by: FAMILY MEDICINE

## 2023-04-23 PROCEDURE — 93005 ELECTROCARDIOGRAM TRACING: CPT

## 2023-04-23 PROCEDURE — 84484 ASSAY OF TROPONIN QUANT: CPT | Performed by: FAMILY MEDICINE

## 2023-04-23 PROCEDURE — 87636 SARSCOV2 & INF A&B AMP PRB: CPT | Performed by: FAMILY MEDICINE

## 2023-04-23 PROCEDURE — 85379 FIBRIN DEGRADATION QUANT: CPT | Performed by: FAMILY MEDICINE

## 2023-04-23 PROCEDURE — 87040 BLOOD CULTURE FOR BACTERIA: CPT | Performed by: FAMILY MEDICINE

## 2023-04-23 PROCEDURE — 85025 COMPLETE CBC W/AUTO DIFF WBC: CPT | Performed by: FAMILY MEDICINE

## 2023-04-23 RX ORDER — ONDANSETRON 4 MG/1
4 TABLET, FILM COATED ORAL EVERY 6 HOURS PRN
Status: DISCONTINUED | OUTPATIENT
Start: 2023-04-23 | End: 2023-04-25 | Stop reason: HOSPADM

## 2023-04-23 RX ORDER — ACETAMINOPHEN 325 MG/1
650 TABLET ORAL EVERY 4 HOURS PRN
Status: DISCONTINUED | OUTPATIENT
Start: 2023-04-23 | End: 2023-04-25 | Stop reason: HOSPADM

## 2023-04-23 RX ORDER — SODIUM CHLORIDE 0.9 % (FLUSH) 0.9 %
10 SYRINGE (ML) INJECTION AS NEEDED
Status: DISCONTINUED | OUTPATIENT
Start: 2023-04-23 | End: 2023-04-25 | Stop reason: HOSPADM

## 2023-04-23 RX ORDER — ASPIRIN 81 MG/1
81 TABLET ORAL DAILY
Status: DISCONTINUED | OUTPATIENT
Start: 2023-04-24 | End: 2023-04-25 | Stop reason: HOSPADM

## 2023-04-23 RX ORDER — ATORVASTATIN CALCIUM 20 MG/1
20 TABLET, FILM COATED ORAL DAILY
Status: DISCONTINUED | OUTPATIENT
Start: 2023-04-24 | End: 2023-04-25 | Stop reason: HOSPADM

## 2023-04-23 RX ORDER — SODIUM CHLORIDE 0.9 % (FLUSH) 0.9 %
10 SYRINGE (ML) INJECTION EVERY 12 HOURS SCHEDULED
Status: DISCONTINUED | OUTPATIENT
Start: 2023-04-23 | End: 2023-04-25 | Stop reason: HOSPADM

## 2023-04-23 RX ORDER — SODIUM CHLORIDE 9 MG/ML
40 INJECTION, SOLUTION INTRAVENOUS AS NEEDED
Status: DISCONTINUED | OUTPATIENT
Start: 2023-04-23 | End: 2023-04-25 | Stop reason: HOSPADM

## 2023-04-23 RX ORDER — IPRATROPIUM BROMIDE AND ALBUTEROL SULFATE 2.5; .5 MG/3ML; MG/3ML
3 SOLUTION RESPIRATORY (INHALATION)
Status: DISCONTINUED | OUTPATIENT
Start: 2023-04-24 | End: 2023-04-25 | Stop reason: HOSPADM

## 2023-04-23 RX ORDER — BUDESONIDE AND FORMOTEROL FUMARATE DIHYDRATE 160; 4.5 UG/1; UG/1
2 AEROSOL RESPIRATORY (INHALATION)
Status: DISCONTINUED | OUTPATIENT
Start: 2023-04-23 | End: 2023-04-25 | Stop reason: HOSPADM

## 2023-04-23 RX ORDER — ALBUTEROL SULFATE 2.5 MG/3ML
2.5 SOLUTION RESPIRATORY (INHALATION) EVERY 4 HOURS PRN
Status: DISCONTINUED | OUTPATIENT
Start: 2023-04-23 | End: 2023-04-25 | Stop reason: HOSPADM

## 2023-04-23 RX ORDER — ONDANSETRON 2 MG/ML
4 INJECTION INTRAMUSCULAR; INTRAVENOUS EVERY 6 HOURS PRN
Status: DISCONTINUED | OUTPATIENT
Start: 2023-04-23 | End: 2023-04-25 | Stop reason: HOSPADM

## 2023-04-23 RX ORDER — METOPROLOL TARTRATE 50 MG/1
100 TABLET, FILM COATED ORAL 2 TIMES DAILY
Status: DISCONTINUED | OUTPATIENT
Start: 2023-04-23 | End: 2023-04-25 | Stop reason: HOSPADM

## 2023-04-23 RX ORDER — MONTELUKAST SODIUM 10 MG/1
10 TABLET ORAL NIGHTLY
Status: DISCONTINUED | OUTPATIENT
Start: 2023-04-23 | End: 2023-04-25 | Stop reason: HOSPADM

## 2023-04-23 RX ORDER — ALLOPURINOL 100 MG/1
100 TABLET ORAL DAILY
Status: DISCONTINUED | OUTPATIENT
Start: 2023-04-24 | End: 2023-04-25 | Stop reason: HOSPADM

## 2023-04-23 RX ORDER — LISINOPRIL 5 MG/1
10 TABLET ORAL DAILY
Status: DISCONTINUED | OUTPATIENT
Start: 2023-04-24 | End: 2023-04-25 | Stop reason: HOSPADM

## 2023-04-23 RX ADMIN — SODIUM CHLORIDE 1000 ML: 9 INJECTION, SOLUTION INTRAVENOUS at 17:13

## 2023-04-23 RX ADMIN — MONTELUKAST 10 MG: 10 TABLET, FILM COATED ORAL at 23:34

## 2023-04-23 RX ADMIN — Medication 10 ML: at 23:35

## 2023-04-23 RX ADMIN — APIXABAN 5 MG: 5 TABLET, FILM COATED ORAL at 23:34

## 2023-04-23 RX ADMIN — METOPROLOL TARTRATE 100 MG: 50 TABLET, FILM COATED ORAL at 23:34

## 2023-04-23 NOTE — ED NOTES
Daughter reports pt began feeling SOA with a worsening cough this AM. Also complaining of back pain when she coughs. Pt has asthma and has used her inhalers with minimal relief.

## 2023-04-23 NOTE — Clinical Note
Level of Care: Telemetry [5]   Diagnosis: Dyspnea, unspecified type [[id number]]   Admitting Physician: PAVEL STEWART [[id number]]   Attending Physician: PAVEL STEWART [[id number]]

## 2023-04-23 NOTE — ED PROVIDER NOTES
Subjective   History of Present Illness  Patient is 78 years old who presented here today with daughter due to cough for the past 1 week.  Also complained of some shortness of breath.  They said that the cough is dry in character.  Denies any fever chills or sweating.  She has past medical CHF and COPD.    History provided by:  Patient   used: No        Review of Systems   Respiratory: Positive for cough and shortness of breath.    All other systems reviewed and are negative.      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   • CHF (congestive heart failure)    • COPD (chronic obstructive pulmonary disease)    • High blood pressure    • Hyperlipidemia    • Pacemaker    • Stage 3 chronic kidney disease    • Stroke     TIA       Allergies   Allergen Reactions   • Penicillins Rash       Past Surgical History:   Procedure Laterality Date   • BLADDER SURGERY     • CARDIAC ELECTROPHYSIOLOGY PROCEDURE N/A 2022    Procedure: PPM generator change - dual;  Surgeon: Shiloh Medrano MD;  Location: [facility] INVASIVE LOCATION;  Service: Cardiology;  Laterality: N/A;   • CARDIAC SURGERY     • COLON RESECTION      x 2 small bowel   • HYSTERECTOMY     • PACEMAKER IMPLANTATION     • VAGINAL BIOPSY         Family History   Problem Relation Age of Onset   • Cancer Mother    • Diabetes Father    • Diabetes Sister    • Diabetes Brother        Social History     Socioeconomic History   • Marital status:    Tobacco Use   • Smoking status: Former     Packs/day: 2.00     Years: 45.00     Pack years: 90.00     Types: Cigarettes     Start date: 3/12/1953     Quit date: 3/12/1993     Years since quittin.1   • Smokeless tobacco: Never   Vaping Use   • Vaping Use: Never used   Substance and Sexual Activity   • Alcohol use: Not Currently   • Drug use: Never   • Sexual activity: Defer           Objective   Physical Exam  Vitals and nursing note  reviewed.   Constitutional:       Appearance: She is well-developed. She is obese.   HENT:      Head: Normocephalic and atraumatic.      Mouth/Throat:      Mouth: Mucous membranes are moist.      Pharynx: Oropharynx is clear.   Eyes:      Extraocular Movements: Extraocular movements intact.      Pupils: Pupils are equal, round, and reactive to light.   Cardiovascular:      Rate and Rhythm: Normal rate and regular rhythm.   Pulmonary:      Effort: Pulmonary effort is normal.      Breath sounds: Normal breath sounds.   Abdominal:      General: Bowel sounds are normal.      Palpations: Abdomen is soft.   Musculoskeletal:         General: Normal range of motion.      Cervical back: Normal range of motion.   Skin:     General: Skin is warm.      Capillary Refill: Capillary refill takes less than 2 seconds.   Neurological:      General: No focal deficit present.      Mental Status: She is alert and oriented to person, place, and time.   Psychiatric:         Mood and Affect: Mood normal.         Behavior: Behavior normal.         ECG 12 Lead Dyspnea      Date/Time: 4/23/2023 4:55 PM  Performed by: John Quintana MD  Authorized by: John Quintana MD   Interpreted by physician  Rhythm: sinus rhythm  Ectopy: PVCs  Rate: normal  BPM: 61  Conduction: incomplete RBBB and LAFB  Clinical impression: abnormal ECG                 ED Course      Labs Reviewed   COMPREHENSIVE METABOLIC PANEL - Abnormal; Notable for the following components:       Result Value    Glucose 186 (*)     Creatinine 1.36 (*)     Chloride 108 (*)     CO2 20.0 (*)     eGFR 39.5 (*)     All other components within normal limits    Narrative:     GFR Normal >60  Chronic Kidney Disease <60  Kidney Failure <15    The GFR formula is only valid for adults with stable renal function between ages 18 and 70.   SINGLE HSTROPONIN T - Abnormal; Notable for the following components:    HS Troponin T 10 (*)     All other components within normal limits     "Narrative:     High Sensitive Troponin T Reference Range:  <10.0 ng/L- Negative Female for AMI  <15.0 ng/L- Negative Male for AMI  >=10 - Abnormal Female indicating possible myocardial injury.  >=15 - Abnormal Male indicating possible myocardial injury.   Clinicians would have to utilize clinical acumen, EKG, Troponin, and serial changes to determine if it is an Acute Myocardial Infarction or myocardial injury due to an underlying chronic condition.        LACTIC ACID, PLASMA - Abnormal; Notable for the following components:    Lactate 2.5 (*)     All other components within normal limits   CBC WITH AUTO DIFFERENTIAL - Abnormal; Notable for the following components:    Monocyte % 4.9 (*)     Neutrophils, Absolute 7.19 (*)     All other components within normal limits   D-DIMER, QUANTITATIVE - Abnormal; Notable for the following components:    D-Dimer, Quantitative >20,000 (*)     All other components within normal limits    Narrative:     According to the assay 's published package insert, a normal (<500 ng/mL (FEU)) D-dimer result in conjunction with a non-high clinical probability assessment, excludes deep vein thrombosis (DVT) and pulmonary embolism (PE) with high sensitivity.    D-dimer values increase with age and this can make VTE exclusion of an older population difficult. To address this, the [employer], based on best available evidence and recent guidelines, recommends that clinicians use age-adjusted D-dimer thresholds in patients greater than 50 years of age with: a) a low probability of PE who do not meet all Pulmonary Embolism Rule Out Criteria, or b) in those with intermediate probability of PE.   The formula for an age-adjusted D-dimer cut-off is \"age*10\".  For example, a 60 year old patient would have an age-adjusted cut-off of 600 ng/mL (FEU) and an 80 year old 800 ng/mL (FEU).     COVID-19 AND FLU A/B, NP SWAB IN TRANSPORT MEDIA 8-12 HR TAT - Normal    Narrative:     " Fact sheet for providers: https://www.fda.gov/media/288697/download    Fact sheet for patients: https://www.fda.gov/media/451456/download    Test performed by PCR.   BNP (IN-HOUSE) - Normal    Narrative:     Among patients with dyspnea, NT-proBNP is highly sensitive for the detection of acute congestive heart failure. In addition NT-proBNP of <300 pg/ml effectively rules out acute congestive heart failure with 99% negative predictive value.     BLOOD CULTURE   BLOOD CULTURE   RAINBOW DRAW    Narrative:     The following orders were created for panel order [place] Draw.  Procedure                               Abnormality         Status                     ---------                               -----------         ------                     Green Top (Gel)[[id number]]                                  Final result               Lavender Top[[id number]]                                     Final result               Gold Top - SST[[id number]]                                   Final result               Light Blue Top[[id number]]                                   Final result                 Please view results for these tests on the individual orders.   LACTIC ACID, REFLEX   URINALYSIS W/ MICROSCOPIC IF INDICATED (NO CULTURE)   CBC AND DIFFERENTIAL    Narrative:     The following orders were created for panel order CBC & Differential.  Procedure                               Abnormality         Status                     ---------                               -----------         ------                     CBC Auto Differential[[id number]]        Abnormal            Final result               Scan Slide[[id number]]                                                                    Please view results for these tests on the individual orders.   GREEN TOP   LAVENDER TOP   GOLD TOP - SST   LIGHT BLUE TOP     XR Chest 2 View    Result Date: 4/23/2023  Narrative: Frontal and lateral views of the chest show clear lungs without a  pleural effusion or cardiomegaly.  No pulmonary edema is seen.  Multiple cardiac leads are seen with one of them having its tip in the expected location of the right ventricular apex.  2 other leads appear to be terminating in the right atrium.                                         MDM    Final diagnoses:   Dyspnea, unspecified type   Positive D dimer   Intravenous infiltration, initial encounter       ED Disposition  ED Disposition     ED Disposition   Decision to Admit    Condition   --    Comment   --             No follow-up provider specified.       Medication List      No changes were made to your prescriptions during this visit.          Erik Bliss MD  04/23/23 1958       Erik Bliss MD  04/23/23 2000

## 2023-04-24 ENCOUNTER — APPOINTMENT (OUTPATIENT)
Dept: NUCLEAR MEDICINE | Facility: HOSPITAL | Age: 78
End: 2023-04-24
Payer: MEDICARE

## 2023-04-24 PROBLEM — J44.1 COPD WITH ACUTE EXACERBATION: Status: ACTIVE | Noted: 2023-04-24

## 2023-04-24 LAB
ANION GAP SERPL CALCULATED.3IONS-SCNC: 10 MMOL/L (ref 5–15)
BASOPHILS # BLD AUTO: 0.11 10*3/MM3 (ref 0–0.2)
BASOPHILS NFR BLD AUTO: 0.9 % (ref 0–1.5)
BUN SERPL-MCNC: 15 MG/DL (ref 8–23)
BUN/CREAT SERPL: 12.1 (ref 7–25)
CALCIUM SPEC-SCNC: 9.2 MG/DL (ref 8.6–10.5)
CHLORIDE SERPL-SCNC: 111 MMOL/L (ref 98–107)
CO2 SERPL-SCNC: 22 MMOL/L (ref 22–29)
CREAT SERPL-MCNC: 1.24 MG/DL (ref 0.57–1)
D-LACTATE SERPL-SCNC: 1.7 MMOL/L (ref 0.5–2)
D-LACTATE SERPL-SCNC: 1.9 MMOL/L (ref 0.5–2)
D-LACTATE SERPL-SCNC: 2.9 MMOL/L (ref 0.5–2)
DEPRECATED RDW RBC AUTO: 48.5 FL (ref 37–54)
EGFRCR SERPLBLD CKD-EPI 2021: 44.1 ML/MIN/1.73
EOSINOPHIL # BLD AUTO: 0.09 10*3/MM3 (ref 0–0.4)
EOSINOPHIL NFR BLD AUTO: 0.8 % (ref 0.3–6.2)
ERYTHROCYTE [DISTWIDTH] IN BLOOD BY AUTOMATED COUNT: 14.7 % (ref 12.3–15.4)
GLUCOSE SERPL-MCNC: 149 MG/DL (ref 65–99)
HCT VFR BLD AUTO: 34.1 % (ref 34–46.6)
HGB BLD-MCNC: 11.2 G/DL (ref 12–15.9)
IMM GRANULOCYTES # BLD AUTO: 0.03 10*3/MM3 (ref 0–0.05)
IMM GRANULOCYTES NFR BLD AUTO: 0.3 % (ref 0–0.5)
LYMPHOCYTES # BLD AUTO: 4.13 10*3/MM3 (ref 0.7–3.1)
LYMPHOCYTES NFR BLD AUTO: 35 % (ref 19.6–45.3)
MCH RBC QN AUTO: 29.6 PG (ref 26.6–33)
MCHC RBC AUTO-ENTMCNC: 32.8 G/DL (ref 31.5–35.7)
MCV RBC AUTO: 90 FL (ref 79–97)
MONOCYTES # BLD AUTO: 0.75 10*3/MM3 (ref 0.1–0.9)
MONOCYTES NFR BLD AUTO: 6.4 % (ref 5–12)
NEUTROPHILS NFR BLD AUTO: 56.6 % (ref 42.7–76)
NEUTROPHILS NFR BLD AUTO: 6.7 10*3/MM3 (ref 1.7–7)
NRBC BLD AUTO-RTO: 0 /100 WBC (ref 0–0.2)
PLATELET # BLD AUTO: 240 10*3/MM3 (ref 140–450)
PMV BLD AUTO: 9.6 FL (ref 6–12)
POTASSIUM SERPL-SCNC: 4.3 MMOL/L (ref 3.5–5.2)
PROCALCITONIN SERPL-MCNC: 0.06 NG/ML (ref 0–0.25)
RBC # BLD AUTO: 3.79 10*6/MM3 (ref 3.77–5.28)
SODIUM SERPL-SCNC: 143 MMOL/L (ref 136–145)
WBC NRBC COR # BLD: 11.81 10*3/MM3 (ref 3.4–10.8)

## 2023-04-24 PROCEDURE — 80048 BASIC METABOLIC PNL TOTAL CA: CPT | Performed by: PHYSICIAN ASSISTANT

## 2023-04-24 PROCEDURE — 96360 HYDRATION IV INFUSION INIT: CPT

## 2023-04-24 PROCEDURE — 83605 ASSAY OF LACTIC ACID: CPT | Performed by: INTERNAL MEDICINE

## 2023-04-24 PROCEDURE — 84145 PROCALCITONIN (PCT): CPT | Performed by: INTERNAL MEDICINE

## 2023-04-24 PROCEDURE — G0378 HOSPITAL OBSERVATION PER HR: HCPCS

## 2023-04-24 PROCEDURE — 94760 N-INVAS EAR/PLS OXIMETRY 1: CPT

## 2023-04-24 PROCEDURE — 85025 COMPLETE CBC W/AUTO DIFF WBC: CPT | Performed by: PHYSICIAN ASSISTANT

## 2023-04-24 PROCEDURE — 0 TECHNETIUM ALBUMIN AGGREGATED: Performed by: INTERNAL MEDICINE

## 2023-04-24 PROCEDURE — 96361 HYDRATE IV INFUSION ADD-ON: CPT

## 2023-04-24 PROCEDURE — 83605 ASSAY OF LACTIC ACID: CPT | Performed by: FAMILY MEDICINE

## 2023-04-24 PROCEDURE — 25010000002 AZITHROMYCIN PER 500 MG: Performed by: HOSPITALIST

## 2023-04-24 PROCEDURE — 78582 LUNG VENTILAT&PERFUS IMAGING: CPT

## 2023-04-24 PROCEDURE — A9540 TC99M MAA: HCPCS | Performed by: INTERNAL MEDICINE

## 2023-04-24 PROCEDURE — 63710000001 PREDNISONE PER 1 MG: Performed by: HOSPITALIST

## 2023-04-24 PROCEDURE — 97162 PT EVAL MOD COMPLEX 30 MIN: CPT

## 2023-04-24 PROCEDURE — 94640 AIRWAY INHALATION TREATMENT: CPT

## 2023-04-24 PROCEDURE — A9539 TC99M PENTETATE: HCPCS | Performed by: INTERNAL MEDICINE

## 2023-04-24 PROCEDURE — 0 TECHNETIUM TC 99M PENTETATE KIT: Performed by: INTERNAL MEDICINE

## 2023-04-24 PROCEDURE — 94799 UNLISTED PULMONARY SVC/PX: CPT

## 2023-04-24 RX ORDER — SODIUM CHLORIDE 9 MG/ML
75 INJECTION, SOLUTION INTRAVENOUS CONTINUOUS
Status: DISCONTINUED | OUTPATIENT
Start: 2023-04-24 | End: 2023-04-24

## 2023-04-24 RX ORDER — PREDNISONE 20 MG/1
40 TABLET ORAL
Status: DISCONTINUED | OUTPATIENT
Start: 2023-04-24 | End: 2023-04-25 | Stop reason: HOSPADM

## 2023-04-24 RX ADMIN — APIXABAN 5 MG: 5 TABLET, FILM COATED ORAL at 22:36

## 2023-04-24 RX ADMIN — IPRATROPIUM BROMIDE AND ALBUTEROL SULFATE 3 ML: .5; 3 SOLUTION RESPIRATORY (INHALATION) at 12:13

## 2023-04-24 RX ADMIN — SODIUM CHLORIDE 75 ML/HR: 9 INJECTION, SOLUTION INTRAVENOUS at 01:31

## 2023-04-24 RX ADMIN — ATORVASTATIN CALCIUM 20 MG: 20 TABLET, FILM COATED ORAL at 08:29

## 2023-04-24 RX ADMIN — Medication 10 ML: at 20:59

## 2023-04-24 RX ADMIN — ALLOPURINOL 100 MG: 100 TABLET ORAL at 08:29

## 2023-04-24 RX ADMIN — BUDESONIDE AND FORMOTEROL FUMARATE DIHYDRATE 2 PUFF: 160; 4.5 AEROSOL RESPIRATORY (INHALATION) at 20:24

## 2023-04-24 RX ADMIN — ASPIRIN 81 MG: 81 TABLET, COATED ORAL at 08:29

## 2023-04-24 RX ADMIN — IPRATROPIUM BROMIDE AND ALBUTEROL SULFATE 3 ML: .5; 3 SOLUTION RESPIRATORY (INHALATION) at 20:23

## 2023-04-24 RX ADMIN — PREDNISONE 40 MG: 20 TABLET ORAL at 17:26

## 2023-04-24 RX ADMIN — IPRATROPIUM BROMIDE AND ALBUTEROL SULFATE 3 ML: .5; 3 SOLUTION RESPIRATORY (INHALATION) at 16:21

## 2023-04-24 RX ADMIN — KIT FOR THE PREPARATION OF TECHNETIUM TC 99M PENTETATE 1 DOSE: 20 INJECTION, POWDER, LYOPHILIZED, FOR SOLUTION INTRAVENOUS; RESPIRATORY (INHALATION) at 10:07

## 2023-04-24 RX ADMIN — IPRATROPIUM BROMIDE AND ALBUTEROL SULFATE 3 ML: .5; 3 SOLUTION RESPIRATORY (INHALATION) at 08:38

## 2023-04-24 RX ADMIN — METOPROLOL TARTRATE 100 MG: 50 TABLET, FILM COATED ORAL at 08:29

## 2023-04-24 RX ADMIN — METOPROLOL TARTRATE 100 MG: 50 TABLET, FILM COATED ORAL at 20:59

## 2023-04-24 RX ADMIN — BUDESONIDE AND FORMOTEROL FUMARATE DIHYDRATE 2 PUFF: 160; 4.5 AEROSOL RESPIRATORY (INHALATION) at 00:04

## 2023-04-24 RX ADMIN — MONTELUKAST 10 MG: 10 TABLET, FILM COATED ORAL at 20:59

## 2023-04-24 RX ADMIN — KIT FOR THE PREPARATION OF TECHNETIUM TC 99M ALBUMIN AGGREGATED 1 DOSE: 2.5 INJECTION, POWDER, FOR SOLUTION INTRAVENOUS at 10:35

## 2023-04-24 RX ADMIN — LISINOPRIL 10 MG: 5 TABLET ORAL at 08:29

## 2023-04-24 RX ADMIN — APIXABAN 5 MG: 5 TABLET, FILM COATED ORAL at 11:06

## 2023-04-24 RX ADMIN — IPRATROPIUM BROMIDE AND ALBUTEROL SULFATE 3 ML: .5; 3 SOLUTION RESPIRATORY (INHALATION) at 00:04

## 2023-04-24 RX ADMIN — AZITHROMYCIN DIHYDRATE 500 MG: 500 INJECTION, POWDER, LYOPHILIZED, FOR SOLUTION INTRAVENOUS at 17:26

## 2023-04-24 RX ADMIN — Medication 10 ML: at 08:28

## 2023-04-24 NOTE — ED NOTES
"Nursing report ED to floor  Pennie Gibson  78 y.o.  female    HPI:   Chief Complaint   Patient presents with    Shortness of Breath    Weakness - Generalized       Admitting doctor:   Osito Jim MD    Consulting provider(s):  Consults       Date and Time Order Name Status Description    4/23/2023  7:52 PM Hospitalist (on-call MD unless specified)               Admitting diagnosis:   The primary encounter diagnosis was Dyspnea, unspecified type. Diagnoses of Positive D dimer and Intravenous infiltration, initial encounter were also pertinent to this visit.    Code status:   Current Code Status       Date Active Code Status Order ID Comments User Context       4/23/2023 2028 CPR (Attempt to Resuscitate) [id number]  Taisha Tamez PA-C ED        Question Answer    Code Status (Patient has no pulse and is not breathing) CPR (Attempt to Resuscitate)    Medical Interventions (Patient has pulse or is breathing) Full Support    Level Of Support Discussed With Patient     Next of Kin (If No Surrogate)                    Allergies:   Penicillins    Intake and Output    Intake/Output Summary (Last 24 hours) at 4/23/2023 2036  Last data filed at 4/23/2023 2030  Gross per 24 hour   Intake 1000 ml   Output --   Net 1000 ml       Weight:       04/23/23  1539   Weight: 88.5 kg (195 lb)       Most recent vitals:   Vitals:    04/23/23 1539 04/23/23 1541 04/23/23 1713   BP: 130/82  137/77   BP Location:   Right arm   Pulse: 66  67   Resp: 28  20   Temp:  97.7 °F (36.5 °C)    TempSrc:  Oral    SpO2: 96%  95%   Weight: 88.5 kg (195 lb)     Height: 154.9 cm (61\")       Oxygen Therapy: room air    Active LDAs/IV Access:   Lines, Drains & Airways       Active LDAs       Name Placement date Placement time Site Days    Peripheral IV 04/23/23 2030 Posterior;Right Hand 04/23/23 2030  Hand  less than 1                    Labs (abnormal labs have a star):   Labs Reviewed   COMPREHENSIVE METABOLIC PANEL - Abnormal; Notable for the " following components:       Result Value    Glucose 186 (*)     Creatinine 1.36 (*)     Chloride 108 (*)     CO2 20.0 (*)     eGFR 39.5 (*)     All other components within normal limits    Narrative:     GFR Normal >60  Chronic Kidney Disease <60  Kidney Failure <15    The GFR formula is only valid for adults with stable renal function between ages 18 and 70.   SINGLE HSTROPONIN T - Abnormal; Notable for the following components:    HS Troponin T 10 (*)     All other components within normal limits    Narrative:     High Sensitive Troponin T Reference Range:  <10.0 ng/L- Negative Female for AMI  <15.0 ng/L- Negative Male for AMI  >=10 - Abnormal Female indicating possible myocardial injury.  >=15 - Abnormal Male indicating possible myocardial injury.   Clinicians would have to utilize clinical acumen, EKG, Troponin, and serial changes to determine if it is an Acute Myocardial Infarction or myocardial injury due to an underlying chronic condition.        LACTIC ACID, PLASMA - Abnormal; Notable for the following components:    Lactate 2.5 (*)     All other components within normal limits   CBC WITH AUTO DIFFERENTIAL - Abnormal; Notable for the following components:    Monocyte % 4.9 (*)     Neutrophils, Absolute 7.19 (*)     All other components within normal limits   D-DIMER, QUANTITATIVE - Abnormal; Notable for the following components:    D-Dimer, Quantitative >20,000 (*)     All other components within normal limits    Narrative:     According to the assay 's published package insert, a normal (<500 ng/mL (FEU)) D-dimer result in conjunction with a non-high clinical probability assessment, excludes deep vein thrombosis (DVT) and pulmonary embolism (PE) with high sensitivity.    D-dimer values increase with age and this can make VTE exclusion of an older population difficult. To address this, the [employer], based on best available evidence and recent guidelines, recommends that  "clinicians use age-adjusted D-dimer thresholds in patients greater than 50 years of age with: a) a low probability of PE who do not meet all Pulmonary Embolism Rule Out Criteria, or b) in those with intermediate probability of PE.   The formula for an age-adjusted D-dimer cut-off is \"age*10\".  For example, a 60 year old patient would have an age-adjusted cut-off of 600 ng/mL (FEU) and an 80 year old 800 ng/mL (FEU).     COVID-19 AND FLU A/B, NP SWAB IN TRANSPORT MEDIA 8-12 HR TAT - Normal    Narrative:     Fact sheet for providers: https://www.fda.gov/media/187803/download    Fact sheet for patients: https://www.fda.gov/media/788799/download    Test performed by PCR.   BNP (IN-HOUSE) - Normal    Narrative:     Among patients with dyspnea, NT-proBNP is highly sensitive for the detection of acute congestive heart failure. In addition NT-proBNP of <300 pg/ml effectively rules out acute congestive heart failure with 99% negative predictive value.     BLOOD CULTURE   BLOOD CULTURE   RAINBOW DRAW    Narrative:     The following orders were created for panel order [place] Draw.  Procedure                               Abnormality         Status                     ---------                               -----------         ------                     Green Top (Gel)[[id number]]                                  Final result               Lavender Top[[id number]]                                     Final result               Gold Top - SST[[id number]]                                   Final result               Light Blue Top[[id number]]                                   Final result                 Please view results for these tests on the individual orders.   LACTIC ACID, REFLEX   URINALYSIS W/ MICROSCOPIC IF INDICATED (NO CULTURE)   CBC AND DIFFERENTIAL    Narrative:     The following orders were created for panel order CBC & Differential.  Procedure                               Abnormality         Status                   "   ---------                               -----------         ------                     CBC Auto Differential[[id number]]        Abnormal            Final result               Scan Slide[[id number]]                                                                    Please view results for these tests on the individual orders.   GREEN TOP   LAVENDER TOP   GOLD TOP - SST   LIGHT BLUE TOP       Meds given in ED:   Medications   sodium chloride 0.9 % flush 10 mL (has no administration in time range)   sodium chloride 0.9 % bolus 500 mL (has no administration in time range)   iopamidol (ISOVUE-370) 76 % injection 100 mL (has no administration in time range)   sodium chloride 0.9 % bolus 1,000 mL (0 mL Intravenous Stopped 4/23/23 2030)           NIH Stroke Scale:       Isolation/Infection(s):  No active isolations   COVID (confirmed)     COVID Testing  Collected Yes  Resulted Negative    Nursing report ED to floor:  Mental status: Alert/Oriented x4  Ambulatory status: stand-by, independent   Precautions: none    ED nurse phone extentsivy- [phone]
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
## 2023-04-24 NOTE — PROGRESS NOTES
"    Lab 04/23/23  2345 04/23/23 2038 04/23/23  1617   LACTATE 2.9* 2.2* 2.5*     Results for orders placed in visit on 09/08/21    Adult Transthoracic Echo Complete w/ Color, Spectral and Contrast if Necessary Per Protocol    Interpretation Summary  · Estimated left ventricular EF = 61% Left ventricular ejection fraction appears to be 61 - 65%. Left ventricular systolic function is normal.  · Left ventricular diastolic function is consistent with (grade Ia w/high LAP) impaired relaxation.  · Left atrial volume is moderately increased.  · Moderate mitral valve regurgitation is present.  · Estimated right ventricular systolic pressure from tricuspid regurgitation is normal (<35 mmHg).    Messaged about elevated lactate levels.  Patient admitted overnight for evaluation of pulmonary embolus.  Status post unsuccessful CTA chest due to extravasation of contrast in left upper extremity.  Patient currently on Eliquis.  Patient being treated for asthma/COPD exacerbation with glucocorticoids, nebulization treatments, and oxygen.  Will check procalcitonin, but patient currently afebrile so no current indication for IV antibiotics.  Expect that lactic acid due to to dehydration versus possible infection.  If procalcitonin elevated, may start antibiotics overnight.  We will cautiously hydrate patient overnight with normal saline and repeat lactic acid levels serially.  Low threshold to get surgery involved if lactic acid levels continue to trend upward.    Patient Vitals for the past 24 hrs:   BP Temp Temp src Pulse Resp SpO2 Height Weight   04/24/23 0004 -- -- -- 72 20 96 % -- --   04/23/23 2147 144/63 96.8 °F (36 °C) Oral 77 22 96 % -- --   04/23/23 2040 -- -- -- 69 -- -- -- --   04/23/23 1713 137/77 -- -- 67 20 95 % -- --   04/23/23 1541 -- 97.7 °F (36.5 °C) Oral -- -- -- -- --   04/23/23 1539 130/82 -- -- 66 28 96 % 154.9 cm (61\") 88.5 kg (195 lb)     "

## 2023-04-24 NOTE — PLAN OF CARE
Goal Outcome Evaluation:  Plan of Care Reviewed With: patient        Progress: no change  Outcome Evaluation: new admit, SOB with any exertion. CT contrast infiltrated in L AC, ice pack applied. VQ scan today. On room air

## 2023-04-24 NOTE — PLAN OF CARE
Goal Outcome Evaluation:  Plan of Care Reviewed With: patient, daughter           Outcome Evaluation: PT eval completed. Patient plesaant and motivated to return home. Patient Amira for supine<>sit transfer with bed rails only, SBA for sit<>stand transfer with no AD. SBA for ambulation 90'x2 with no AD. Patient with SOB with all activities but SPO2% stayed steady. 27/28 on Tinetti balance and gait assessment which indicates low fall risk at this time. Recommend patient ambulate with dtr in hallway for safety. Anticipate home with assist and HHPT.

## 2023-04-24 NOTE — H&P
[facility] Medicine  HISTORY AND PHYSICAL      Date of Admission: 4/23/2023  Primary Care Physician: Isaac Love MD    Subjective     Chief Complaint: Shortness of breath, Cough    History of Present Illness  Patient is an 78 year old female (PMHx Asthma, COPD, HLD, HTN, CHF, CKD, CVA, Atrial fibrillation) who presented to [facility] ED with complaint of week-long complaint of cough and intermittent shortness of breath. Patient reports this cough has been hacking, dry. She denies any associated fever, chills, or body aches. No known sick contacts. She has not noticed any swelling in her legs.     ED findings today reveal stable CBC and CMP; baseline creatinine noted for known CKD. There was initial elevation of lactic acid at 2.5; D-dimer elevation >20,000. CXR unremarkable. CTA of the chest was attempted to rule out PE, but the IV infiltrated and contrast extravasated. V/Q scan ordered for tomorrow. Hospitalist team contacted for observation admission.    Review of Systems   Constitutional: Negative for appetite change, chills, diaphoresis, fatigue and fever.   HENT: Negative for congestion, ear pain, postnasal drip, rhinorrhea, sinus pressure, sinus pain, sneezing, sore throat and trouble swallowing.    Eyes: Negative for photophobia, pain and discharge.   Respiratory: Positive for cough and shortness of breath. Negative for chest tightness and wheezing.    Cardiovascular: Negative for chest pain, palpitations and leg swelling.   Gastrointestinal: Negative for abdominal pain, blood in stool, constipation, diarrhea, nausea and vomiting.   Endocrine: Negative for polydipsia, polyphagia and polyuria.   Genitourinary: Negative for difficulty urinating, dysuria, flank pain, frequency, hematuria and urgency.   Musculoskeletal: Negative for arthralgias, back pain, myalgias and neck pain.   Skin: Negative for color change, rash and wound.   Neurological: Negative for  dizziness, seizures, syncope, weakness and headaches.   Hematological: Does not bruise/bleed easily.   Psychiatric/Behavioral: Negative for behavioral problems, confusion, hallucinations, sleep disturbance and suicidal ideas.        Otherwise complete ROS reviewed and negative except as mentioned in the HPI.    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   • CHF (congestive heart failure)    • COPD (chronic obstructive pulmonary disease)    • High blood pressure    • Hyperlipidemia    • Pacemaker    • Stage 3 chronic kidney disease    • Stroke     TIA     Past Surgical History:  Past Surgical History:   Procedure Laterality Date   • BLADDER SURGERY     • CARDIAC ELECTROPHYSIOLOGY PROCEDURE N/A 5/26/2022    Procedure: PPM generator change - dual;  Surgeon: Shiloh Medrano MD;  Location: [facility] INVASIVE LOCATION;  Service: Cardiology;  Laterality: N/A;   • CARDIAC SURGERY     • COLON RESECTION      x 2 small bowel   • HYSTERECTOMY     • PACEMAKER IMPLANTATION     • VAGINAL BIOPSY       Social History:  reports that she quit smoking about 30 years ago. Her smoking use included cigarettes. She started smoking about 70 years ago. She has a 90.00 pack-year smoking history. She has never used smokeless tobacco. She reports that she does not currently use alcohol. She reports that she does not use drugs.    Family History: family history includes Cancer in her mother; Diabetes in her brother, father, and sister.       Allergies:  Allergies   Allergen Reactions   • Penicillins Rash       Medications:  Prior to Admission medications    Medication Sig Start Date End Date Taking? Authorizing Provider   albuterol (PROVENTIL) (2.5 MG/3ML) 0.083% nebulizer solution Take 2.5 mg by nebulization Every 4 (Four) Hours As Needed for Wheezing.  Patient not taking: Reported on 4/4/2023 3/18/23   Madie Alcantara, DEN   albuterol sulfate  (90  "Base) MCG/ACT inhaler Inhale 2 puffs Every 4 (Four) Hours As Needed for Wheezing. 4/19/23   Isaac Love MD   allopurinol (ZYLOPRIM) 100 MG tablet Take 1 tablet by mouth once daily 3/6/23   Isaac Love MD   aspirin (aspirin) 81 MG EC tablet Take 1 tablet by mouth Daily. 5/17/21   Isaac Love MD   atorvastatin (LIPITOR) 20 MG tablet Take 1 tablet by mouth once daily 2/14/23   Isaac Love MD   calcium carbonate (OS-MICHELL) 600 MG tablet Take 1 tablet by mouth Daily.    ProviderLeighann MD   Cranberry 1000 MG capsule Take 4,200 mg by mouth Daily.    ProviderLeighann MD   Cyanocobalamin (VITAMIN B 12 PO) Take 1,000 mcg by mouth Daily.    ProviderLeighann MD   doxycycline (VIBRAMYCIN) 100 MG capsule Take 1 capsule by mouth 2 (Two) Times a Day. 4/4/23   Isaac Love MD   Eliquis 5 MG tablet tablet TAKE 1 TABLET BY MOUTH EVERY 12 HOURS 3/30/23   Isaac Love MD   Garlic 1000 MG capsule Take 1 capsule by mouth Daily.    ProviderLeighann MD   lisinopril (PRINIVIL,ZESTRIL) 20 MG tablet Take 0.5 tablets by mouth Daily. 12/7/22   Isaac Love MD   metoprolol tartrate (LOPRESSOR) 100 MG tablet Take 1 tablet by mouth twice daily 11/14/22   Isaac Love MD   montelukast (Singulair) 10 MG tablet Take 1 tablet by mouth Every Night. 1/9/23   Isaac Love MD   Symbicort 80-4.5 MCG/ACT inhaler Inhale 2 puffs 2 (Two) Times a Day. 10/31/22   Mariela Villegas APRN   vitamin C (ASCORBIC ACID) 250 MG tablet Take 1 tablet by mouth 2 (Two) Times a Day.    ProviderLeighann MD     I have utilized all available immediate resources to obtain, update, and review the patient's current medications.    Objective     Vital Signs: /77 (BP Location: Right arm)   Pulse 67   Temp 97.7 °F (36.5 °C) (Oral)   Resp 20   Ht 154.9 cm (61\")   Wt 88.5 kg (195 lb)   SpO2 95%   BMI 36.84 kg/m²   Physical Exam  Vitals and nursing note reviewed.   Constitutional:       Appearance: " Normal appearance.   HENT:      Head: Normocephalic and atraumatic.      Right Ear: External ear normal.      Left Ear: External ear normal.      Nose: Nose normal. No congestion or rhinorrhea.      Mouth/Throat:      Mouth: Mucous membranes are moist.      Pharynx: Oropharynx is clear.   Eyes:      General: No scleral icterus.     Extraocular Movements: Extraocular movements intact.      Conjunctiva/sclera: Conjunctivae normal.      Pupils: Pupils are equal, round, and reactive to light.   Neck:      Vascular: No carotid bruit.   Cardiovascular:      Rate and Rhythm: Normal rate and regular rhythm.      Pulses: Normal pulses.      Heart sounds: Normal heart sounds. No murmur heard.  Pulmonary:      Effort: Pulmonary effort is normal.      Breath sounds: Normal breath sounds. No wheezing, rhonchi or rales.   Abdominal:      General: Abdomen is flat. Bowel sounds are normal.      Palpations: Abdomen is soft.      Tenderness: There is no abdominal tenderness. There is no guarding.   Musculoskeletal:         General: No swelling or deformity. Normal range of motion.      Cervical back: Normal range of motion and neck supple. No tenderness.      Right lower leg: No edema.      Left lower leg: No edema.   Skin:     General: Skin is warm and dry.      Capillary Refill: Capillary refill takes less than 2 seconds.      Findings: No rash.   Neurological:      General: No focal deficit present.      Mental Status: She is alert and oriented to person, place, and time.      Motor: No weakness.   Psychiatric:         Mood and Affect: Mood normal.         Behavior: Behavior normal.         Thought Content: Thought content normal.         Judgment: Judgment normal.              Results Reviewed:  Lab Results (last 24 hours)     Procedure Component Value Units Date/Time    D-dimer, Quantitative [[id number]]  (Abnormal) Collected: 04/23/23 1617    Specimen: Blood Updated: 04/23/23 1829     D-Dimer, Quantitative >20,000 ng/mL (FEU)   "   Narrative:      According to the assay 's published package insert, a normal (<500 ng/mL (FEU)) D-dimer result in conjunction with a non-high clinical probability assessment, excludes deep vein thrombosis (DVT) and pulmonary embolism (PE) with high sensitivity.    D-dimer values increase with age and this can make VTE exclusion of an older population difficult. To address this, the [employer], based on best available evidence and recent guidelines, recommends that clinicians use age-adjusted D-dimer thresholds in patients greater than 50 years of age with: a) a low probability of PE who do not meet all Pulmonary Embolism Rule Out Criteria, or b) in those with intermediate probability of PE.   The formula for an age-adjusted D-dimer cut-off is \"age*10\".  For example, a 60 year old patient would have an age-adjusted cut-off of 600 ng/mL (FEU) and an 80 year old 800 ng/mL (FEU).      [place] Draw [[id number]] Collected: 04/23/23 1617    Specimen: Blood Updated: 04/23/23 1731    Narrative:      The following orders were created for panel order [place] Draw.  Procedure                               Abnormality         Status                     ---------                               -----------         ------                     Green Top (Gel)[[id number]]                                  Final result               Lavender Top[[id number]]                                     Final result               Gold Top - SST[[id number]]                                   Final result               Light Blue Top[[id number]]                                   Final result                 Please view results for these tests on the individual orders.    Lavender Top [[id number]] Collected: 04/23/23 1617    Specimen: Blood Updated: 04/23/23 1731     Extra Tube hold for add-on     Comment: Auto resulted       Gold Top - SST [[id number]] Collected: 04/23/23 1617    Specimen: Blood Updated: 04/23/23 1731     " Extra Tube Hold for add-ons.     Comment: Auto resulted.       Light Blue Top [[id number]] Collected: 04/23/23 1617    Specimen: Blood Updated: 04/23/23 1731     Extra Tube Hold for add-ons.     Comment: Auto resulted       Green Top (Gel) [[id number]] Collected: 04/23/23 1617    Specimen: Blood Updated: 04/23/23 1731     Extra Tube Hold for add-ons.     Comment: Auto resulted.       COVID-19 and FLU A/B PCR - Swab, Nasopharynx [[id number]]  (Normal) Collected: 04/23/23 1640    Specimen: Swab from Nasopharynx Updated: 04/23/23 1707     COVID19 Not Detected     Influenza A PCR Not Detected     Influenza B PCR Not Detected    Narrative:      Fact sheet for providers: https://www.fda.gov/media/196904/download    Fact sheet for patients: https://www.fda.gov/media/301714/download    Test performed by PCR.    Comprehensive Metabolic Panel [[id number]]  (Abnormal) Collected: 04/23/23 1617    Specimen: Blood Updated: 04/23/23 1648     Glucose 186 mg/dL      BUN 16 mg/dL      Creatinine 1.36 mg/dL      Sodium 139 mmol/L      Potassium 4.8 mmol/L      Comment: Slight hemolysis detected by analyzer. Results may be affected.        Chloride 108 mmol/L      CO2 20.0 mmol/L      Calcium 9.4 mg/dL      Total Protein 6.0 g/dL      Albumin 3.6 g/dL      ALT (SGPT) 16 U/L      AST (SGOT) 28 U/L      Comment: Slight hemolysis detected by analyzer. Results may be affected.        Alkaline Phosphatase 85 U/L      Total Bilirubin 0.3 mg/dL      Globulin 2.4 gm/dL      A/G Ratio 1.5 g/dL      BUN/Creatinine Ratio 11.8     Anion Gap 11.0 mmol/L      eGFR 39.5 mL/min/1.73     Narrative:      GFR Normal >60  Chronic Kidney Disease <60  Kidney Failure <15    The GFR formula is only valid for adults with stable renal function between ages 18 and 70.    Single High Sensitivity Troponin T [[id number]]  (Abnormal) Collected: 04/23/23 1617    Specimen: Blood Updated: 04/23/23 1645     HS Troponin T 10 ng/L     Narrative:      High Sensitive Troponin T  Reference Range:  <10.0 ng/L- Negative Female for AMI  <15.0 ng/L- Negative Male for AMI  >=10 - Abnormal Female indicating possible myocardial injury.  >=15 - Abnormal Male indicating possible myocardial injury.   Clinicians would have to utilize clinical acumen, EKG, Troponin, and serial changes to determine if it is an Acute Myocardial Infarction or myocardial injury due to an underlying chronic condition.         Lactic Acid, Plasma [[id number]]  (Abnormal) Collected: 04/23/23 1617    Specimen: Blood Updated: 04/23/23 1645     Lactate 2.5 mmol/L     BNP [[id number]]  (Normal) Collected: 04/23/23 1617    Specimen: Blood Updated: 04/23/23 1644     proBNP 916.1 pg/mL     Narrative:      Among patients with dyspnea, NT-proBNP is highly sensitive for the detection of acute congestive heart failure. In addition NT-proBNP of <300 pg/ml effectively rules out acute congestive heart failure with 99% negative predictive value.      CBC & Differential [[id number]]  (Abnormal) Collected: 04/23/23 1617    Specimen: Blood Updated: 04/23/23 1634    Narrative:      The following orders were created for panel order CBC & Differential.  Procedure                               Abnormality         Status                     ---------                               -----------         ------                     CBC Auto Differential[[id number]]        Abnormal            Final result               Scan Slide[[id number]]                                                                    Please view results for these tests on the individual orders.    CBC Auto Differential [[id number]]  (Abnormal) Collected: 04/23/23 1617    Specimen: Blood Updated: 04/23/23 1632     WBC 10.20 10*3/mm3      RBC 4.55 10*6/mm3      Hemoglobin 13.4 g/dL      Hematocrit 41.0 %      MCV 90.1 fL      MCH 29.5 pg      MCHC 32.7 g/dL      RDW 14.6 %      RDW-SD 48.0 fl      MPV 9.5 fL      Platelets 215 10*3/mm3      Neutrophil % 70.5 %      Lymphocyte % 23.1 %       Monocyte % 4.9 %      Eosinophil % 0.6 %      Basophil % 0.6 %      Immature Grans % 0.3 %      Neutrophils, Absolute 7.19 10*3/mm3      Lymphocytes, Absolute 2.36 10*3/mm3      Monocytes, Absolute 0.50 10*3/mm3      Eosinophils, Absolute 0.06 10*3/mm3      Basophils, Absolute 0.06 10*3/mm3      Immature Grans, Absolute 0.03 10*3/mm3      nRBC 0.0 /100 WBC     Blood Culture - Blood, Arm, Left [[id number]] Collected: 04/23/23 1617    Specimen: Blood from Arm, Left Updated: 04/23/23 1625        Imaging Results (Last 24 Hours)     Procedure Component Value Units Date/Time    CT Chest Without Contrast Diagnostic [[id number]] Resulted: 04/23/23 1913     Updated: 04/23/23 1936    XR Chest 2 View [[id number]] Collected: 04/23/23 1641     Updated: 04/23/23 1645    Narrative:      Frontal and lateral views of the chest show clear lungs without a pleural  effusion or cardiomegaly.  No pulmonary edema is seen.  Multiple cardiac leads  are seen with one of them having its tip in the expected location of the right  ventricular apex.  2 other leads appear to be terminating in the right atrium.        I have personally reviewed and interpreted the radiology studies and ECG obtained at time of admission.       Assessment / Plan   Treatment Plan:  1. Dyspnea, unspecified type   -elevated d-dimer; normal CXR. V/Q scan ordered for tomorrow since contrast dye extravasation tonight   -continue supportive care including neb, inhaler; no wheezing at this time; steroids do not appear indicated   -is on room air    2. Atrial fibrillation   -Continue Eliquis, lopressor    3. Contrast dye extravasation   -localized edema to LUE   -apply ice, monitor overnight    4. HTN   -Continue Lisinopril    5. HLD   -continue Lipitor      Medical Decision Making  Number and Complexity of problems: 2 high, 2 moderate    Conditions and Status:        Condition is unchanged.     [facility] Data  External documents reviewed: The patient's recent past medical charts for  "this facility as well as outside facilities via the \"Care Everywhere\" application of Epic reviewed.  My EKG interpretation: Sinus rhythm, ventricular rate 61, no acute St wave elevation     Discussed with: attending physician     I have utilized all available immediate resources to obtain, update, or review the patient's current medications (including all prescriptions, over-the-counter products, herbals, cannabis/cannabidiol products, and vitamin/mineral/dietary (nutritional) supplements).     Care Planning  Shared decision making: Patient updated on current status and informed of proposed care plan; is in agreement with plan  Code status and discussions: Full code  I confirmed that the patient's Advance Care Plan is present, code status is documented, or surrogate decision maker is listed in the patient's medical record.       Disposition  Social Determinants of Health that impact treatment or disposition: n/a  I expect the patient to be discharged to home in 1-2 days.     The patient was seen and examined by me on 04/23/23.        Electronically signed by Taisha Tamez PA-C, 04/23/23, 20:10 CDT.              "

## 2023-04-24 NOTE — PROGRESS NOTES
[facility] Medicine   INPATIENT PROGRESS NOTE      Patient Name: Pennie Gibson  Date of Admission: 4/23/2023  Today's Date: 04/24/23  Length of Stay: 0  Primary Care Physician: Isaac Love MD    Subjective   Chief Complaint:   HPI   Overnight course reviewed, she was going for a V/Q scan at the time, daughter in room. Seen after it was done and result came back. Explained that and other results done earlier. Some dyspnea and occasional coughing no sputum. No nausea or vomiting or cramping or chest pain   Review of Systems   All pertinent negatives and positives are as above. All other systems have been reviewed and are negative unless otherwise stated.     Objective    Temp:  [96.8 °F (36 °C)-97.7 °F (36.5 °C)] 97.2 °F (36.2 °C)  Heart Rate:  [60-77] 74  Resp:  [20-28] 20  BP: (130-146)/(59-82) 146/63  Physical Exam  Sitting in bed, sat up  PERRL, EOMI. [place]  Mucosae moist  Breathing limited on room air  Heart rate controlled. Mildly hypertensive  Abdominal obesity  Moving all limbs  Rest as before  Results Review:  I have reviewed the labs, radiology results, and diagnostic studies.    Laboratory Data:   Results from last 7 days   Lab Units 04/24/23  0159 04/23/23  1617   WBC 10*3/mm3 11.81* 10.20   HEMOGLOBIN g/dL 11.2* 13.4   HEMATOCRIT % 34.1 41.0   PLATELETS 10*3/mm3 240 215        Results from last 7 days   Lab Units 04/24/23  0159 04/23/23  1617   SODIUM mmol/L 143 139   POTASSIUM mmol/L 4.3 4.8   CHLORIDE mmol/L 111* 108*   CO2 mmol/L 22.0 20.0*   BUN mg/dL 15 16   CREATININE mg/dL 1.24* 1.36*   CALCIUM mg/dL 9.2 9.4   BILIRUBIN mg/dL  --  0.3   ALK PHOS U/L  --  85   ALT (SGPT) U/L  --  16   AST (SGOT) U/L  --  28   GLUCOSE mg/dL 149* 186*       Culture Data:   No results found for: BLOODCX  No results found for: URINECX  No results found for: RESPCX  No results found for: WOUNDCX  No results found for: STOOLCX  No components found for:  BODYFLD    Radiology Data:   Imaging Results (Last 24 Hours)     Procedure Component Value Units Date/Time    US Venous Doppler Lower Extremity Bilateral (duplex) [[id number]] Collected: 04/23/23 2157     Updated: 04/23/23 2200    Narrative:      INDICATION: Positive d-dimer    TECHNIQUE:  High-resolution gray scale imaging, color flow Doppler, compression were  performed from the groin to the calf of the bilateral lower extremity.  Augmentation was performed of the bilateral common femoral veins and popliteal  veins.    FINDINGS:  Common femoral, superficial femoral and popliteal veins are normally  compressible.  A normal phasic flow and augmentation is seen in common femoral  and popliteal veins.      Impression:      No evidence of DVT seen in  bilateral lower extremity.    CT Chest Without Contrast Diagnostic [[id number]] Collected: 04/23/23 2008     Updated: 04/23/23 2159    Narrative:        INDICATION:  Shortness of breath.    TECHNIQUE:  Axial images from the thoracic inlet through the levels of the diaphragms were  performed followed by 2D multiplanar reformats.    COMPARISON:  1/12/2023    FINDINGS:  Moderate to marked respiratory motion artifact compromises evaluation diffusely.    No lymphadenopathy or pleural or pericardial effusion is seen.  Small hiatal  hernia is stable.  Lung details are compromised by respiratory motion artifact.  A stable left upper lobe lung opacity may be from scarring or atelectasis.  Remaining lungs show no significant abnormality.  Again seen is an anterior  mediastinal nodule showing a density of a simple cyst.  This may represent a  thymic cyst measuring 1.5 cm.  The visualized upper abdomen and the bones show  no significant abnormality.    IMPRESSIONS:  1.  Evaluation compromised by moderate to marked respiratory motion artifact  noted diffusely.    2.  No definite acute abnormality seen.    3.  Stable mild scarring or atelectasis in the left upper lobe.    4.  Stable  small hiatal hernia.    5.  Stable 1.5 cm nodule in the anterior mediastinum on the left showing a  density of a simple cyst.  This may represent a thymic cyst.    XR Chest 2 View [[id number]] Collected: 04/23/23 1641     Updated: 04/23/23 1645    Narrative:      Frontal and lateral views of the chest show clear lungs without a pleural  effusion or cardiomegaly.  No pulmonary edema is seen.  Multiple cardiac leads  are seen with one of them having its tip in the expected location of the right  ventricular apex.  2 other leads appear to be terminating in the right atrium.          I have reviewed the patient's current medications.     Assessment/Plan     Active Hospital Problems    Diagnosis    • **COPD with acute exacerbation Based on the imaging and clinical presentation, appears to have COPD exacerbation though they were aware of asthma but she used to smoke till age 48 and had 2nd hand smoke exposure from her mother. Give her azithromycin and prednisone and wait for breathing to improve   • Dyspnea, unspecified type Initial concern about PE or DVT due to elevated D-dimer but the initial CT chest, the doppler of the leg and the V/Q scan today dont show high probability of DVT or PE hence doesn't need therapeutic anticoagulation   • Class 2 obesity due to excess calories  Complicates all aspects of care   • Chronic kidney disease Stable at this time   • Congestive heart failure Continue metoprolol and eliquis for now   • Atrial fibrillation Rate control with metoprolol at this time   Stay today on the floor, will see if she might be able to go home int the next 1-2 days  Electronically signed by Dangelo Hermosillo MD, 04/24/23, 08:36 CDT.

## 2023-04-25 ENCOUNTER — READMISSION MANAGEMENT (OUTPATIENT)
Dept: CALL CENTER | Facility: HOSPITAL | Age: 78
End: 2023-04-25
Payer: MEDICARE

## 2023-04-25 VITALS
TEMPERATURE: 97.5 F | SYSTOLIC BLOOD PRESSURE: 170 MMHG | HEIGHT: 61 IN | HEART RATE: 90 BPM | BODY MASS INDEX: 36.21 KG/M2 | DIASTOLIC BLOOD PRESSURE: 70 MMHG | RESPIRATION RATE: 18 BRPM | OXYGEN SATURATION: 93 % | WEIGHT: 191.8 LBS

## 2023-04-25 PROBLEM — T80.1XXA INTRAVENOUS INFILTRATION: Status: ACTIVE | Noted: 2023-04-25

## 2023-04-25 PROBLEM — R79.89 POSITIVE D DIMER: Status: ACTIVE | Noted: 2023-04-25

## 2023-04-25 LAB
ANION GAP SERPL CALCULATED.3IONS-SCNC: 11 MMOL/L (ref 5–15)
BACTERIA UR QL AUTO: ABNORMAL /HPF
BILIRUB UR QL STRIP: NEGATIVE
BUN SERPL-MCNC: 16 MG/DL (ref 8–23)
BUN/CREAT SERPL: 11.5 (ref 7–25)
CALCIUM SPEC-SCNC: 10.1 MG/DL (ref 8.6–10.5)
CHLORIDE SERPL-SCNC: 109 MMOL/L (ref 98–107)
CLARITY UR: CLEAR
CO2 SERPL-SCNC: 20 MMOL/L (ref 22–29)
COLOR UR: YELLOW
CREAT SERPL-MCNC: 1.39 MG/DL (ref 0.57–1)
D-LACTATE SERPL-SCNC: 1.9 MMOL/L (ref 0.5–2)
EGFRCR SERPLBLD CKD-EPI 2021: 38.4 ML/MIN/1.73
GLUCOSE SERPL-MCNC: 146 MG/DL (ref 65–99)
GLUCOSE UR STRIP-MCNC: NEGATIVE MG/DL
HGB UR QL STRIP.AUTO: NEGATIVE
HYALINE CASTS UR QL AUTO: ABNORMAL /LPF
KETONES UR QL STRIP: NEGATIVE
LEUKOCYTE ESTERASE UR QL STRIP.AUTO: ABNORMAL
NITRITE UR QL STRIP: NEGATIVE
PH UR STRIP.AUTO: 6.5 [PH] (ref 5–9)
POTASSIUM SERPL-SCNC: 5.3 MMOL/L (ref 3.5–5.2)
PROT UR QL STRIP: NEGATIVE
RBC # UR STRIP: ABNORMAL /HPF
REF LAB TEST METHOD: ABNORMAL
SODIUM SERPL-SCNC: 140 MMOL/L (ref 136–145)
SP GR UR STRIP: 1.01 (ref 1–1.03)
SQUAMOUS #/AREA URNS HPF: ABNORMAL /HPF
UROBILINOGEN UR QL STRIP: ABNORMAL
WBC # UR STRIP: ABNORMAL /HPF
WHOLE BLOOD HOLD SPECIMEN: NORMAL

## 2023-04-25 PROCEDURE — 94799 UNLISTED PULMONARY SVC/PX: CPT

## 2023-04-25 PROCEDURE — 36415 COLL VENOUS BLD VENIPUNCTURE: CPT | Performed by: PHYSICIAN ASSISTANT

## 2023-04-25 PROCEDURE — G0378 HOSPITAL OBSERVATION PER HR: HCPCS

## 2023-04-25 PROCEDURE — 80048 BASIC METABOLIC PNL TOTAL CA: CPT | Performed by: PHYSICIAN ASSISTANT

## 2023-04-25 PROCEDURE — 97530 THERAPEUTIC ACTIVITIES: CPT

## 2023-04-25 PROCEDURE — 94760 N-INVAS EAR/PLS OXIMETRY 1: CPT

## 2023-04-25 PROCEDURE — 83605 ASSAY OF LACTIC ACID: CPT | Performed by: INTERNAL MEDICINE

## 2023-04-25 PROCEDURE — 81001 URINALYSIS AUTO W/SCOPE: CPT | Performed by: EMERGENCY MEDICINE

## 2023-04-25 PROCEDURE — 94664 DEMO&/EVAL PT USE INHALER: CPT

## 2023-04-25 PROCEDURE — 63710000001 PREDNISONE PER 1 MG: Performed by: HOSPITALIST

## 2023-04-25 RX ORDER — AZITHROMYCIN 250 MG/1
250 TABLET, FILM COATED ORAL DAILY
Qty: 3 TABLET | Refills: 0 | Status: SHIPPED | OUTPATIENT
Start: 2023-04-25 | End: 2023-04-28

## 2023-04-25 RX ORDER — PREDNISONE 20 MG/1
40 TABLET ORAL
Qty: 10 TABLET | Refills: 0 | Status: SHIPPED | OUTPATIENT
Start: 2023-04-26

## 2023-04-25 RX ADMIN — ASPIRIN 81 MG: 81 TABLET, COATED ORAL at 08:11

## 2023-04-25 RX ADMIN — ALLOPURINOL 100 MG: 100 TABLET ORAL at 08:11

## 2023-04-25 RX ADMIN — APIXABAN 5 MG: 5 TABLET, FILM COATED ORAL at 11:41

## 2023-04-25 RX ADMIN — BUDESONIDE AND FORMOTEROL FUMARATE DIHYDRATE 2 PUFF: 160; 4.5 AEROSOL RESPIRATORY (INHALATION) at 07:50

## 2023-04-25 RX ADMIN — IPRATROPIUM BROMIDE AND ALBUTEROL SULFATE 3 ML: .5; 3 SOLUTION RESPIRATORY (INHALATION) at 07:50

## 2023-04-25 RX ADMIN — Medication 10 ML: at 08:11

## 2023-04-25 RX ADMIN — IPRATROPIUM BROMIDE AND ALBUTEROL SULFATE 3 ML: .5; 3 SOLUTION RESPIRATORY (INHALATION) at 11:03

## 2023-04-25 RX ADMIN — METOPROLOL TARTRATE 100 MG: 50 TABLET, FILM COATED ORAL at 08:11

## 2023-04-25 RX ADMIN — LISINOPRIL 10 MG: 5 TABLET ORAL at 08:11

## 2023-04-25 RX ADMIN — ATORVASTATIN CALCIUM 20 MG: 20 TABLET, FILM COATED ORAL at 08:11

## 2023-04-25 RX ADMIN — PREDNISONE 40 MG: 20 TABLET ORAL at 08:11

## 2023-04-25 NOTE — DISCHARGE SUMMARY
[facility]  DISCHARGE SUMMARY       Date of Admission: 4/23/2023  Date of Discharge:  4/25/2023  Primary Care Physician: Isaac Love MD    Presenting Problem:  Positive D dimer [R79.89]  Intravenous infiltration, initial encounter [T80.1XXA]  Dyspnea, unspecified type [R06.00]   ***    Final Discharge Diagnoses:  Active Hospital Problems    Diagnosis    • **COPD with acute exacerbation    • Positive D dimer    • Intravenous infiltration    • Dyspnea, unspecified type    • Class 2 obesity due to excess calories without serious comorbidity with body mass index (BMI) of 38.0 to 38.9 in adult    • Chronic kidney disease    • Congestive heart failure    • Atrial fibrillation      ***    HPI:***    Consults:   Consults     Date and Time Order Name Status Description    4/23/2023  7:52 PM Hospitalist (on-call MD unless specified)            Procedures Performed:                 Pertinent Test Results:   Lab Results (most recent)     Procedure Component Value Units Date/Time    Urinalysis With Microscopic If Indicated (No Culture) - Urine, Catheter [[id number]]  (Abnormal) Collected: 04/25/23 0906    Specimen: Urine, Catheter Updated: 04/25/23 1003     Color, UA Yellow     Appearance, UA Clear     pH, UA 6.5     Specific Gravity, UA 1.013     Glucose, UA Negative     Ketones, UA Negative     Bilirubin, UA Negative     Blood, UA Negative     Protein, UA Negative     Leuk Esterase, UA Moderate (2+)     Nitrite, UA Negative     Urobilinogen, UA 0.2 E.U./dL    Urinalysis, Microscopic Only - Urine, Catheter [[id number]]  (Abnormal) Collected: 04/25/23 0906    Specimen: Urine, Catheter Updated: 04/25/23 1003     RBC, UA 0-2 /HPF      WBC, UA 6-12 /HPF      Bacteria, UA None Seen /HPF      Squamous Epithelial Cells, UA 3-5 /HPF      Hyaline Casts, UA None Seen /LPF      Methodology Automated Microscopy    Extra Tubes [[id number]] Collected: 04/25/23 0820     Specimen: Blood, Venous Line Updated: 04/25/23 0931    Narrative:      The following orders were created for panel order Extra Tubes.  Procedure                               Abnormality         Status                     ---------                               -----------         ------                     Lavender Top[[id number]]                                     Final result                 Please view results for these tests on the individual orders.    Lavender Top [[id number]] Collected: 04/25/23 0820    Specimen: Blood Updated: 04/25/23 0931     Extra Tube hold for add-on     Comment: Auto resulted       Basic Metabolic Panel [[id number]]  (Abnormal) Collected: 04/25/23 0820    Specimen: Blood Updated: 04/25/23 0907     Glucose 146 mg/dL      BUN 16 mg/dL      Creatinine 1.39 mg/dL      Sodium 140 mmol/L      Potassium 5.3 mmol/L      Chloride 109 mmol/L      CO2 20.0 mmol/L      Calcium 10.1 mg/dL      BUN/Creatinine Ratio 11.5     Anion Gap 11.0 mmol/L      eGFR 38.4 mL/min/1.73     Narrative:      GFR Normal >60  Chronic Kidney Disease <60  Kidney Failure <15    The GFR formula is only valid for adults with stable renal function between ages 18 and 70.    Lactic Acid, Plasma [[id number]]  (Normal) Collected: 04/25/23 0820    Specimen: Blood Updated: 04/25/23 0857     Lactate 1.9 mmol/L     Blood Culture - Blood, Arm, Right [[id number]]  (Normal) Collected: 04/23/23 2030    Specimen: Blood from Arm, Right Updated: 04/24/23 2046     Blood Culture No growth at 24 hours    Blood Culture - Blood, Arm, Left [[id number]]  (Normal) Collected: 04/23/23 1617    Specimen: Blood from Arm, Left Updated: 04/24/23 1631     Blood Culture No growth at 24 hours    Lactic Acid, Plasma [[id number]]  (Normal) Collected: 04/24/23 0754    Specimen: Blood Updated: 04/24/23 0840     Lactate 1.9 mmol/L     Procalcitonin [[id number]]  (Normal) Collected: 04/24/23 0159    Specimen: Blood Updated: 04/24/23 0311     Procalcitonin 0.06 ng/mL   "   Narrative:      As a Marker for Sepsis (Non-Neonates):    1. <0.5 ng/mL represents a low risk of severe sepsis and/or septic shock.  2. >2 ng/mL represents a high risk of severe sepsis and/or septic shock.    As a Marker for Lower Respiratory Tract Infections that require antibiotic therapy:    PCT on Admission    Antibiotic Therapy       6-12 Hrs later    >0.5                Strongly Recommended  >0.25 - <0.5        Recommended   0.1 - 0.25          Discouraged              Remeasure/reassess PCT  <0.1                Strongly Discouraged     Remeasure/reassess PCT    As 28 day mortality risk marker: \"Change in Procalcitonin Result\" (>80% or <=80%) if Day 0 (or Day 1) and Day 4 values are available. Refer to http://www.[email or web address]pct-calculator.com    Change in PCT <=80%  A decrease of PCT levels below or equal to 80% defines a positive change in PCT test result representing a higher risk for 28-day all-cause mortality of patients diagnosed with severe sepsis for septic shock.    Change in PCT >80%  A decrease of PCT levels of more than 80% defines a negative change in PCT result representing a lower risk for 28-day all-cause mortality of patients diagnosed with severe sepsis or septic shock.       Basic Metabolic Panel [[id number]]  (Abnormal) Collected: 04/24/23 0159    Specimen: Blood Updated: 04/24/23 0303     Glucose 149 mg/dL      BUN 15 mg/dL      Creatinine 1.24 mg/dL      Sodium 143 mmol/L      Potassium 4.3 mmol/L      Chloride 111 mmol/L      CO2 22.0 mmol/L      Calcium 9.2 mg/dL      BUN/Creatinine Ratio 12.1     Anion Gap 10.0 mmol/L      eGFR 44.1 mL/min/1.73     Narrative:      GFR Normal >60  Chronic Kidney Disease <60  Kidney Failure <15    The GFR formula is only valid for adults with stable renal function between ages 18 and 70.    STAT Lactic Acid, Reflex [[id number]]  (Normal) Collected: 04/24/23 0159    Specimen: Blood Updated: 04/24/23 0301     Lactate 1.7 mmol/L     CBC Auto Differential " "[[id number]]  (Abnormal) Collected: 04/24/23 0159    Specimen: Blood Updated: 04/24/23 0219     WBC 11.81 10*3/mm3      RBC 3.79 10*6/mm3      Hemoglobin 11.2 g/dL      Hematocrit 34.1 %      MCV 90.0 fL      MCH 29.6 pg      MCHC 32.8 g/dL      RDW 14.7 %      RDW-SD 48.5 fl      MPV 9.6 fL      Platelets 240 10*3/mm3      Neutrophil % 56.6 %      Lymphocyte % 35.0 %      Monocyte % 6.4 %      Eosinophil % 0.8 %      Basophil % 0.9 %      Immature Grans % 0.3 %      Neutrophils, Absolute 6.70 10*3/mm3      Lymphocytes, Absolute 4.13 10*3/mm3      Monocytes, Absolute 0.75 10*3/mm3      Eosinophils, Absolute 0.09 10*3/mm3      Basophils, Absolute 0.11 10*3/mm3      Immature Grans, Absolute 0.03 10*3/mm3      nRBC 0.0 /100 WBC     STAT Lactic Acid, Reflex [[id number]]  (Abnormal) Collected: 04/23/23 2345    Specimen: Blood Updated: 04/24/23 0022     Lactate 2.9 mmol/L     D-dimer, Quantitative [[id number]]  (Abnormal) Collected: 04/23/23 1617    Specimen: Blood Updated: 04/23/23 1829     D-Dimer, Quantitative >20,000 ng/mL (FEU)     Narrative:      According to the assay 's published package insert, a normal (<500 ng/mL (FEU)) D-dimer result in conjunction with a non-high clinical probability assessment, excludes deep vein thrombosis (DVT) and pulmonary embolism (PE) with high sensitivity.    D-dimer values increase with age and this can make VTE exclusion of an older population difficult. To address this, the [employer], based on best available evidence and recent guidelines, recommends that clinicians use age-adjusted D-dimer thresholds in patients greater than 50 years of age with: a) a low probability of PE who do not meet all Pulmonary Embolism Rule Out Criteria, or b) in those with intermediate probability of PE.   The formula for an age-adjusted D-dimer cut-off is \"age*10\".  For example, a 60 year old patient would have an age-adjusted cut-off of 600 ng/mL (FEU) and an 80 year old " 800 ng/mL (FEU).      [place] Draw [[id number]] Collected: 04/23/23 1617    Specimen: Blood Updated: 04/23/23 1731    Narrative:      The following orders were created for panel order [place] Draw.  Procedure                               Abnormality         Status                     ---------                               -----------         ------                     Green Top (Gel)[[id number]]                                  Final result               Lavender Top[[id number]]                                     Final result               Gold Top - SST[[id number]]                                   Final result               Light Blue Top[[id number]]                                   Final result                 Please view results for these tests on the individual orders.    Lavender Top [[id number]] Collected: 04/23/23 1617    Specimen: Blood Updated: 04/23/23 1731     Extra Tube hold for add-on     Comment: Auto resulted       Gold Top - SST [[id number]] Collected: 04/23/23 1617    Specimen: Blood Updated: 04/23/23 1731     Extra Tube Hold for add-ons.     Comment: Auto resulted.       Light Blue Top [[id number]] Collected: 04/23/23 1617    Specimen: Blood Updated: 04/23/23 1731     Extra Tube Hold for add-ons.     Comment: Auto resulted       Green Top (Gel) [[id number]] Collected: 04/23/23 1617    Specimen: Blood Updated: 04/23/23 1731     Extra Tube Hold for add-ons.     Comment: Auto resulted.       COVID-19 and FLU A/B PCR - Swab, Nasopharynx [[id number]]  (Normal) Collected: 04/23/23 1640    Specimen: Swab from Nasopharynx Updated: 04/23/23 1707     COVID19 Not Detected     Influenza A PCR Not Detected     Influenza B PCR Not Detected    Narrative:      Fact sheet for providers: https://www.fda.gov/media/431186/download    Fact sheet for patients: https://www.fda.gov/media/000686/download    Test performed by PCR.    Comprehensive Metabolic Panel [[id number]]  (Abnormal) Collected: 04/23/23 1617    Specimen:  Blood Updated: 04/23/23 1648     Glucose 186 mg/dL      BUN 16 mg/dL      Creatinine 1.36 mg/dL      Sodium 139 mmol/L      Potassium 4.8 mmol/L      Comment: Slight hemolysis detected by analyzer. Results may be affected.        Chloride 108 mmol/L      CO2 20.0 mmol/L      Calcium 9.4 mg/dL      Total Protein 6.0 g/dL      Albumin 3.6 g/dL      ALT (SGPT) 16 U/L      AST (SGOT) 28 U/L      Comment: Slight hemolysis detected by analyzer. Results may be affected.        Alkaline Phosphatase 85 U/L      Total Bilirubin 0.3 mg/dL      Globulin 2.4 gm/dL      A/G Ratio 1.5 g/dL      BUN/Creatinine Ratio 11.8     Anion Gap 11.0 mmol/L      eGFR 39.5 mL/min/1.73     Narrative:      GFR Normal >60  Chronic Kidney Disease <60  Kidney Failure <15    The GFR formula is only valid for adults with stable renal function between ages 18 and 70.    Single High Sensitivity Troponin T [[id number]]  (Abnormal) Collected: 04/23/23 1617    Specimen: Blood Updated: 04/23/23 1645     HS Troponin T 10 ng/L     Narrative:      High Sensitive Troponin T Reference Range:  <10.0 ng/L- Negative Female for AMI  <15.0 ng/L- Negative Male for AMI  >=10 - Abnormal Female indicating possible myocardial injury.  >=15 - Abnormal Male indicating possible myocardial injury.   Clinicians would have to utilize clinical acumen, EKG, Troponin, and serial changes to determine if it is an Acute Myocardial Infarction or myocardial injury due to an underlying chronic condition.         BNP [[id number]]  (Normal) Collected: 04/23/23 1617    Specimen: Blood Updated: 04/23/23 1644     proBNP 916.1 pg/mL     Narrative:      Among patients with dyspnea, NT-proBNP is highly sensitive for the detection of acute congestive heart failure. In addition NT-proBNP of <300 pg/ml effectively rules out acute congestive heart failure with 99% negative predictive value.      CBC & Differential [[id number]]  (Abnormal) Collected: 04/23/23 1617    Specimen: Blood Updated: 04/23/23  1634    Narrative:      The following orders were created for panel order CBC & Differential.  Procedure                               Abnormality         Status                     ---------                               -----------         ------                     CBC Auto Differential[[id number]]        Abnormal            Final result               Scan Slide[[id number]]                                                                    Please view results for these tests on the individual orders.    CBC Auto Differential [[id number]]  (Abnormal) Collected: 04/23/23 1617    Specimen: Blood Updated: 04/23/23 1632     WBC 10.20 10*3/mm3      RBC 4.55 10*6/mm3      Hemoglobin 13.4 g/dL      Hematocrit 41.0 %      MCV 90.1 fL      MCH 29.5 pg      MCHC 32.7 g/dL      RDW 14.6 %      RDW-SD 48.0 fl      MPV 9.5 fL      Platelets 215 10*3/mm3      Neutrophil % 70.5 %      Lymphocyte % 23.1 %      Monocyte % 4.9 %      Eosinophil % 0.6 %      Basophil % 0.6 %      Immature Grans % 0.3 %      Neutrophils, Absolute 7.19 10*3/mm3      Lymphocytes, Absolute 2.36 10*3/mm3      Monocytes, Absolute 0.50 10*3/mm3      Eosinophils, Absolute 0.06 10*3/mm3      Basophils, Absolute 0.06 10*3/mm3      Immature Grans, Absolute 0.03 10*3/mm3      nRBC 0.0 /100 WBC         Imaging Results (Most Recent)     Procedure Component Value Units Date/Time    NM Lung Ventilation Perfusion [[id number]] Collected: 04/24/23 1108     Updated: 04/24/23 1343    Narrative:      Exam:  Ventilation perfusion scan.    History:  Elevated D-dimer.  Shortness of breath.    Comparison:  Two-view chest 04/23/2023.    Technique:  33.0 mCi of Tc99m-DTPA was dispensed in a nebulizer to administer 1 mCi of Tc99m  DTPA by inhalation and multiple static images of both lungs were taken in  different projections.  Subsequently, the patient was injected with 6.6 mCi  Tc99m-MAA and static images of both lungs were repeated in the same projections.      Findings:  Mildly  heterogenous distribution of isotope throughout the lungs of both the  ventilation perfusion components of the exam.  This is more pronounced on the  ventilation component.  There is also some clumping in the central airways on  the ventilatory images, which is suggestive of turbulent flow, often seen in the  setting of COPD.  No moderate to large ventilation perfusion mismatches are  identified.      Impression:      Low probability for pulmonary embolism.    US Venous Doppler Lower Extremity Bilateral (duplex) [[id number]] Collected: 04/23/23 2157     Updated: 04/23/23 2200    Narrative:      INDICATION: Positive d-dimer    TECHNIQUE:  High-resolution gray scale imaging, color flow Doppler, compression were  performed from the groin to the calf of the bilateral lower extremity.  Augmentation was performed of the bilateral common femoral veins and popliteal  veins.    FINDINGS:  Common femoral, superficial femoral and popliteal veins are normally  compressible.  A normal phasic flow and augmentation is seen in common femoral  and popliteal veins.      Impression:      No evidence of DVT seen in  bilateral lower extremity.    CT Chest Without Contrast Diagnostic [[id number]] Collected: 04/23/23 2008     Updated: 04/23/23 2159    Narrative:        INDICATION:  Shortness of breath.    TECHNIQUE:  Axial images from the thoracic inlet through the levels of the diaphragms were  performed followed by 2D multiplanar reformats.    COMPARISON:  1/12/2023    FINDINGS:  Moderate to marked respiratory motion artifact compromises evaluation diffusely.    No lymphadenopathy or pleural or pericardial effusion is seen.  Small hiatal  hernia is stable.  Lung details are compromised by respiratory motion artifact.  A stable left upper lobe lung opacity may be from scarring or atelectasis.  Remaining lungs show no significant abnormality.  Again seen is an anterior  mediastinal nodule showing a density of a simple cyst.  This may  "represent a  thymic cyst measuring 1.5 cm.  The visualized upper abdomen and the bones show  no significant abnormality.    IMPRESSIONS:  1.  Evaluation compromised by moderate to marked respiratory motion artifact  noted diffusely.    2.  No definite acute abnormality seen.    3.  Stable mild scarring or atelectasis in the left upper lobe.    4.  Stable small hiatal hernia.    5.  Stable 1.5 cm nodule in the anterior mediastinum on the left showing a  density of a simple cyst.  This may represent a thymic cyst.    XR Chest 2 View [[id number]] Collected: 04/23/23 1641     Updated: 04/23/23 1645    Narrative:      Frontal and lateral views of the chest show clear lungs without a pleural  effusion or cardiomegaly.  No pulmonary edema is seen.  Multiple cardiac leads  are seen with one of them having its tip in the expected location of the right  ventricular apex.  2 other leads appear to be terminating in the right atrium.          Chief Complaint on Day of Discharge: ***    Hospital Course:  The patient is a 78 y.o. female who [facility] to [facility] secondary to***.      Condition on Discharge:  ***    Physical Exam on Discharge:  /70 (BP Location: Left arm, Patient Position: Sitting)   Pulse 90 Comment: just was up walking  Temp 97.5 °F (36.4 °C) (Temporal)   Resp 18   Ht 154.9 cm (61\")   Wt 87 kg (191 lb 12.8 oz)   SpO2 93% Comment: walked around the whole unit and remained at 93% on room air.  BMI 36.24 kg/m²   Physical Exam  ***    Discharge Disposition:  Home or Self Care    Discharge Medications:     Discharge Medications      New Medications      Instructions Start Date   azithromycin 250 MG tablet  Commonly known as: Zithromax Z-Colton   250 mg, Oral, Daily      predniSONE 20 MG tablet  Commonly known as: DELTASONE   40 mg, Oral, Daily With Breakfast   Start Date: April 26, 2023        Continue These Medications      Instructions Start Date   albuterol sulfate  (90 Base) " MCG/ACT inhaler  Commonly known as: PROVENTIL HFA;VENTOLIN HFA;PROAIR HFA   2 puffs, Inhalation, Every 4 Hours PRN      allopurinol 100 MG tablet  Commonly known as: ZYLOPRIM   Take 1 tablet by mouth once daily      aspirin 81 MG EC tablet   81 mg, Oral, Daily      atorvastatin 20 MG tablet  Commonly known as: LIPITOR   Take 1 tablet by mouth once daily      calcium carbonate 600 MG tablet  Commonly known as: OS-MICHELL   600 mg, Oral, Daily      Cranberry 1000 MG capsule   4,200 mg, Oral, Daily      Eliquis 5 MG tablet tablet  Generic drug: apixaban   TAKE 1 TABLET BY MOUTH EVERY 12 HOURS      Garlic 1000 MG capsule   1,000 mg, Oral, Daily      lisinopril 20 MG tablet  Commonly known as: PRINIVIL,ZESTRIL   10 mg, Oral, Daily      metoprolol tartrate 100 MG tablet  Commonly known as: LOPRESSOR   Take 1 tablet by mouth twice daily      montelukast 10 MG tablet  Commonly known as: Singulair   10 mg, Oral, Nightly      Symbicort 80-4.5 MCG/ACT inhaler  Generic drug: budesonide-formoterol   2 puffs, Inhalation, 2 Times Daily - RT      VITAMIN B 12 PO   1,000 mcg, Oral, Daily         Stop These Medications    vitamin C 250 MG tablet  Commonly known as: ASCORBIC ACID            Discharge Diet: ***    Activity at Discharge: ***    Discharge Care Plan/Instructions: ***    Follow-up Appointments:   Future Appointments   Date Time Provider Department [place]   5/9/2023  3:00 PM Isaac Love MD [facility]   5/23/2023  8:00 AM [address]   6/12/2023 11:00 AM Mariela Villegas APRN [facility]   6/27/2023  3:00 PM Matthew Callejas APRN LAN END [facility]   8/11/2023 11:00 AM Shiloh Medrano MD [facility] CD [facility] None       Test Results Pending at Discharge:   Pending Labs     Order Current Status    Blood Culture - Blood, Arm, Left Preliminary result    Blood Culture - Blood, Arm, Right Preliminary result                Time: ***

## 2023-04-25 NOTE — PLAN OF CARE
Problem: Adult Inpatient Plan of Care  Goal: Plan of Care Review  Outcome: Ongoing, Progressing  Flowsheets (Taken 4/25/2023 0036)  Progress: no change  Plan of Care Reviewed With: patient  Outcome Evaluation: pt vs stable, remains on room air, no new acute events at this time.  Goal: Patient-Specific Goal (Individualized)  Outcome: Ongoing, Progressing  Goal: Absence of Hospital-Acquired Illness or Injury  Outcome: Ongoing, Progressing  Intervention: Identify and Manage Fall Risk  Recent Flowsheet Documentation  Taken 4/25/2023 0015 by Mayte Borges RN  Safety Promotion/Fall Prevention:   safety round/check completed   nonskid shoes/slippers when out of bed  Taken 4/24/2023 2236 by Mayte Borges RN  Safety Promotion/Fall Prevention:   safety round/check completed   nonskid shoes/slippers when out of bed  Taken 4/24/2023 2057 by Mayte Borges, RN  Safety Promotion/Fall Prevention:   safety round/check completed   nonskid shoes/slippers when out of bed  Intervention: Prevent and Manage VTE (Venous Thromboembolism) Risk  Recent Flowsheet Documentation  Taken 4/24/2023 2057 by Mayte Borges, RN  Activity Management: activity encouraged  VTE Prevention/Management: (eliquis) other (see comments)  Goal: Optimal Comfort and Wellbeing  Outcome: Ongoing, Progressing  Goal: Readiness for Transition of Care  Outcome: Ongoing, Progressing   Goal Outcome Evaluation:  Plan of Care Reviewed With: patient        Progress: no change  Outcome Evaluation: pt vs stable, remains on room air, no new acute events at this time.

## 2023-04-25 NOTE — PROGRESS NOTES
[facility]   INPATIENT PROGRESS NOTE      Patient Name: Pennie Gibson  Date of Admission: 4/23/2023  Today's Date: 04/25/23  Length of Stay: 0  Primary Care Physician: Isaac Love MD    Subjective   Chief Complaint:   HPI   Overnight course reviewed, she was going for a V/Q scan at the time, daughter in room. Seen after it was done and result came back. Explained that and other results done earlier. Some dyspnea and occasional coughing no sputum. No nausea or vomiting or cramping or chest pain   Review of Systems   Respiratory: Positive for shortness of breath.       All pertinent negatives and positives are as above. All other systems have been reviewed and are negative unless otherwise stated.     Objective    Temp:  [96.1 °F (35.6 °C)-97.9 °F (36.6 °C)] 97.5 °F (36.4 °C)  Heart Rate:  [60-80] 60  Resp:  [18-20] 18  BP: (133-170)/(58-70) 170/70  Physical Exam  Sitting in bed, sat up  PERRL, EOMI. "[employer]  Mucosae moist  Breathing limited on room air  Heart rate controlled. Mildly hypertensive  Abdominal obesity  Moving all limbs  Rest as before  Results Review:  I have reviewed the labs, radiology results, and diagnostic studies.    Laboratory Data:   Results from last 7 days   Lab Units 04/24/23  0159 04/23/23  1617   WBC 10*3/mm3 11.81* 10.20   HEMOGLOBIN g/dL 11.2* 13.4   HEMATOCRIT % 34.1 41.0   PLATELETS 10*3/mm3 240 215        Results from last 7 days   Lab Units 04/25/23  0820 04/24/23  0159 04/23/23  1617   SODIUM mmol/L 140 143 139   POTASSIUM mmol/L 5.3* 4.3 4.8   CHLORIDE mmol/L 109* 111* 108*   CO2 mmol/L 20.0* 22.0 20.0*   BUN mg/dL 16 15 16   CREATININE mg/dL 1.39* 1.24* 1.36*   CALCIUM mg/dL 10.1 9.2 9.4   BILIRUBIN mg/dL  --   --  0.3   ALK PHOS U/L  --   --  85   ALT (SGPT) U/L  --   --  16   AST (SGOT) U/L  --   --  28   GLUCOSE mg/dL 146* 149* 186*       Culture Data:   Blood Culture   Date Value Ref Range Status   04/23/2023  No growth at 24 hours  Preliminary   04/23/2023 No growth at 24 hours  Preliminary     No results found for: URINECX  No results found for: RESPCX  No results found for: WOUNDCX  No results found for: STOOLCX  No components found for: BODYFLD    Radiology Data:   Imaging Results (Last 24 Hours)     Procedure Component Value Units Date/Time    NM Lung Ventilation Perfusion [[id number]] Collected: 04/24/23 1108     Updated: 04/24/23 1343    Narrative:      Exam:  Ventilation perfusion scan.    History:  Elevated D-dimer.  Shortness of breath.    Comparison:  Two-view chest 04/23/2023.    Technique:  33.0 mCi of Tc99m-DTPA was dispensed in a nebulizer to administer 1 mCi of Tc99m  DTPA by inhalation and multiple static images of both lungs were taken in  different projections.  Subsequently, the patient was injected with 6.6 mCi  Tc99m-MAA and static images of both lungs were repeated in the same projections.      Findings:  Mildly heterogenous distribution of isotope throughout the lungs of both the  ventilation perfusion components of the exam.  This is more pronounced on the  ventilation component.  There is also some clumping in the central airways on  the ventilatory images, which is suggestive of turbulent flow, often seen in the  setting of COPD.  No moderate to large ventilation perfusion mismatches are  identified.      Impression:      Low probability for pulmonary embolism.          I have reviewed the patient's current medications.     Assessment/Plan     Active Hospital Problems    Diagnosis    • **COPD with acute exacerbation Based on the imaging and clinical presentation, appears to have COPD exacerbation though they were aware of asthma but she used to smoke till age 48 and had 2nd hand smoke exposure from her mother. Give her azithromycin and prednisone and wait for breathing to improve   • Dyspnea, unspecified type Initial concern about PE or DVT due to elevated D-dimer but the initial CT chest, the  doppler of the leg and the V/Q scan today dont show high probability of DVT or PE hence doesn't need therapeutic anticoagulation   • Class 2 obesity due to excess calories  Complicates all aspects of care   • Chronic kidney disease Stable at this time   • Congestive heart failure Continue metoprolol and eliquis for now   • Atrial fibrillation Rate control with metoprolol at this time   Stay today on the floor, will see if she might be able to go home int the next 1-2 days  Electronically signed by Dangelo Hermosillo MD, 04/25/23, 11:37 CDT.

## 2023-04-25 NOTE — OUTREACH NOTE
Prep Survey    Flowsheet Row Responses   [facility] patient discharged from? [place]   Is LACE score < 7 ? No   Eligibility [facility]   Date of Admission 04/23/23   Date of Discharge 04/25/23   Discharge Disposition Home or Self Care   Discharge diagnosis COPD with acute exacerbation   Does the patient have one of the following disease processes/diagnoses(primary or secondary)? COPD   Does the patient have Home health ordered? No   Is there a DME ordered? No   Prep survey completed? Yes          Peg MAS - Registered Nurse

## 2023-04-25 NOTE — THERAPY TREATMENT NOTE
Acute Care - Physical Therapy Treatment Note  [facility]     Patient Name: Pennie Gibson  : 1945  MRN: [id number]  Today's Date: 2023      Visit Dx:     ICD-10-CM ICD-9-CM   1. Dyspnea, unspecified type  R06.00 786.09   2. Positive D dimer  R79.89 790.92   3. Intravenous infiltration, initial encounter  T80.1XXA 999.9   4. Impaired functional mobility and activity tolerance  Z74.09 V49.89     Patient Active Problem List   Diagnosis   • Seasonal allergies   • Acute UTI   • Congestive heart failure   • Nonrheumatic mitral valve regurgitation   • Nonrheumatic tricuspid valve regurgitation   • Sick sinus syndrome   • Cardiac pacemaker in situ   • Atrial fibrillation   • Gout   • Chronic kidney disease   • Moderate persistent asthma without complication   • Complications, mechanical, pacemaker, cardiac   • Class 2 obesity due to excess calories without serious comorbidity with body mass index (BMI) of 38.0 to 38.9 in adult   • Acute bronchitis due to COVID-19 virus   • Dyspnea, unspecified type   • COPD with acute exacerbation     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   • CHF (congestive heart failure)    • COPD (chronic obstructive pulmonary disease)    • High blood pressure    • Hyperlipidemia    • Pacemaker    • Stage 3 chronic kidney disease    • Stroke     TIA     Past Surgical History:   Procedure Laterality Date   • BLADDER SURGERY     • CARDIAC ELECTROPHYSIOLOGY PROCEDURE N/A 2022    Procedure: PPM generator change - dual;  Surgeon: Shiloh Medrano MD;  Location: [facility] INVASIVE LOCATION;  Service: Cardiology;  Laterality: N/A;   • CARDIAC SURGERY     • COLON RESECTION      x 2 small bowel   • HYSTERECTOMY     • PACEMAKER IMPLANTATION     • VAGINAL BIOPSY       PT Assessment (last 12 hours)     PT Evaluation and Treatment     Row Name 23 0958          Physical Therapy Time and Intention    Document Type  therapy note (daily note)  -RW     Mode of Treatment physical therapy  -RW     Patient Effort good  -RW     Row Name 04/25/23 0958          General Information    Patient Profile Reviewed yes  -RW     Row Name 04/25/23 0958          Pain    Pretreatment Pain Rating 0/10 - no pain  -RW     Posttreatment Pain Rating 0/10 - no pain  -RW     Row Name 04/25/23 0958          Cognition    Orientation Status (Cognition) oriented x 4  -RW     Row Name 04/25/23 0958          Range of Motion Comprehensive    General Range of Motion no range of motion deficits identified  -RW     Row Name 04/25/23 0958          Bed Mobility    Bed Mobility supine-sit;sit-supine  -RW     Supine-Sit [place] (Bed Mobility) modified independence  -RW     Sit-Supine [place] (Bed Mobility) modified independence  -RW     Assistive Device (Bed Mobility) bed rails  -RW     Row Name 04/25/23 0958          Bed-Chair Transfer    Bed-Chair [place] (Transfers) standby assist  -RW     Row Name 04/25/23 0958          Sit-Stand Transfer    Sit-Stand [place] (Transfers) standby assist  -RW     Row Name 04/25/23 0958          Gait/Stairs (Locomotion)    [place] Level (Gait) standby assist  -RW     Distance in Feet (Gait) 380  -RW     Pattern (Gait) step-through  -RW     Deviations/Abnormal Patterns (Gait) gait speed decreased  -RW     [place] Level (Stairs) stand by assist  -RW     Handrail Location (Stairs) both sides  -RW     Number of Steps (Stairs) 2-3 x 2  -RW     Ascending Technique (Stairs) step-to-step  -RW     Descending Technique (Stairs) step-to-step  -RW     Row Name 04/25/23 0958          Motor Skills    Therapeutic Exercise hip;knee;ankle;core strength  -RW     Row Name 04/25/23 0958          Hip (Therapeutic Exercise)    Hip (Therapeutic Exercise) strengthening exercise  -RW     Row Name 04/25/23 0958          Knee (Therapeutic Exercise)    Knee (Therapeutic Exercise) strengthening exercise  -RW     Knee Strengthening  (Therapeutic Exercise) LAQ (long arc quad);15 repititions  -RW     Row Name 04/25/23 0958          Vital Signs    Pre Systolic BP Rehab 148  -RW     Pre Treatment Diastolic BP 66  -RW     Pretreatment Heart Rate (beats/min) 66  -RW     Pre SpO2 (%) 97  -RW     O2 Delivery Pre Treatment room air  -RW     Row Name 04/25/23 0958          Positioning and Restraints    Pre-Treatment Position in bed  -RW     Post Treatment Position chair  -RW     In Chair call light within reach;encouraged to call for assist  -RW     Row Name 04/25/23 0958          Therapy Assessment/Plan (PT)    Rehab Potential (PT) good, to achieve stated therapy goals  -RW     Criteria for Skilled Interventions Met (PT) yes;meets criteria;skilled treatment is necessary  -RW     Therapy Frequency (PT) other (see comments)  5-7d/week  -RW     Row Name 04/25/23 0958          Bed Mobility Goal 1 (PT)    Activity/Assistive Device (Bed Mobility Goal 1, PT) sit to supine;supine to sit  -RW     [place] Level/Cues Needed (Bed Mobility Goal 1, PT) independent  -RW     Time Frame (Bed Mobility Goal 1, PT) by discharge  -RW     Progress/Outcomes (Bed Mobility Goal 1, PT) goal not met  -RW     Row Name 04/25/23 0958          Transfer Goal 1 (PT)    Activity/Assistive Device (Transfer Goal 1, PT) sit-to-stand/stand-to-sit;bed-to-chair/chair-to-bed  -RW     [place] Level/Cues Needed (Transfer Goal 1, PT) independent  -RW     Time Frame (Transfer Goal 1, PT) by discharge  -RW     Progress/Outcome (Transfer Goal 1, PT) goal not met  -RW     Row Name 04/25/23 0958          Gait Training Goal 1 (PT)    Activity/Assistive Device (Gait Training Goal 1, PT) gait (walking locomotion)  -RW     [place] Level (Gait Training Goal 1, PT) independent  -RW     Distance (Gait Training Goal 1, PT) 150'x2  -RW     Time Frame (Gait Training Goal 1, PT) by discharge  -RW     Progress/Outcome (Gait Training Goal 1, PT) goal partially met  -RW     Row Name 04/25/23 0958           Stairs Goal 1 (PT)    Activity/Assistive Device (Stairs Goal 1, PT) stairs, all skills;ascending stairs;descending stairs  -RW     [place] Level/Cues Needed (Stairs Goal 1, PT) modified independence  -RW     Number of Stairs (Stairs Goal 1, PT) 2  -RW     Time Frame (Stairs Goal 1, PT) by discharge  -RW     Progress/Outcome (Stairs Goal 1, PT) goal partially met  -RW           User Key  (r) = Recorded By, (t) = Taken By, (c) = Cosigned By    Initials Name Provider Type    RW Donnie Yusuf, PTA Physical Therapist Assistant                Physical Therapy Education     Title: PT OT SLP Therapies (Done)     Topic: Physical Therapy (Done)     Point: Mobility training (Done)     Learning Progress Summary           Patient Acceptance, E,TB, VU by LR at 4/24/2023 0946    Comment: Educated on PT POC and goals.   Family Acceptance, E,TB, VU by LR at 4/24/2023 0946    Comment: Educated on PT POC and goals.                   Point: Home exercise program (Done)     Learning Progress Summary           Patient Acceptance, E,TB, VU by LR at 4/24/2023 0946    Comment: Educated on PT POC and goals.   Family Acceptance, E,TB, VU by LR at 4/24/2023 0946    Comment: Educated on PT POC and goals.                   Point: Body mechanics (Done)     Learning Progress Summary           Patient Acceptance, E,TB, VU by LR at 4/24/2023 0946    Comment: Educated on PT POC and goals.   Family Acceptance, E,TB, VU by LR at 4/24/2023 0946    Comment: Educated on PT POC and goals.                   Point: Precautions (Done)     Learning Progress Summary           Patient Acceptance, E,TB, VU by LR at 4/24/2023 0946    Comment: Educated on PT POC and goals.   Family Acceptance, E,TB, VU by LR at 4/24/2023 0946    Comment: Educated on PT POC and goals.                               User Key     Initials Effective Dates Name Provider Type Discipline    LR 06/16/21 -  Malik Guerra Physical Therapist PT              PT Recommendation and  Plan  Anticipated Discharge Disposition (PT): home with assist, home with home health  Therapy Frequency (PT): other (see comments) (5-7d/week)  Plan of Care Reviewed With: patient  Progress: improving  Outcome Evaluation: pt in bed and is mod ind with bed mobility.  gt x 380' no ad sba. staurs 2-3 x 2 with carmen hr cga/sba. back to room into chair . home with asssit at [place].       Time Calculation:    PT Charges     Row Name 04/25/23 1250             Time Calculation    Start Time 0958  -RW      Stop Time 1025  -RW      Time Calculation (min) 27 min  -RW         Time Calculation- PT    Total Timed Code Minutes- PT 27 minute(s)  -RW         Timed Charges    59297 - PT Therapeutic Activity Minutes 27  -RW         Total Minutes    Timed Charges Total Minutes 27  -RW       Total Minutes 27  -RW            User Key  (r) = Recorded By, (t) = Taken By, (c) = Cosigned By    Initials Name Provider Type    RW Donnie Yusuf PTA Physical Therapist Assistant              Therapy Charges for Today     Code Description Service Date Service Provider Modifiers Qty    [id number]  PT THERAPEUTIC ACT EA 15 MIN 4/25/2023 Donnie Yusuf PTA GP 2          PT G-Codes  Outcome Measure Options: AM-PAC 6 Clicks Basic Mobility (PT), [place]  AM-PAC 6 Clicks Score (PT): 19  [place] Total Score: 27    Donnie Yusuf PTA  4/25/2023

## 2023-04-26 ENCOUNTER — TRANSITIONAL CARE MANAGEMENT TELEPHONE ENCOUNTER (OUTPATIENT)
Dept: CALL CENTER | Facility: HOSPITAL | Age: 78
End: 2023-04-26
Payer: MEDICARE

## 2023-04-26 NOTE — OUTREACH NOTE
Call Center TCM Note    Flowsheet Row Responses   [facility] patient discharged from? [place]   Does the patient have one of the following disease processes/diagnoses(primary or secondary)? COPD   TCM attempt successful? Yes   Call start time 1354   Call end time 1400   Discharge diagnosis COPD with acute exacerbation   Is patient permission given to speak with other caregiver? Yes   List who call center can speak with Rita Daughter    Person spoke with today (if not patient) and relationship Rita Daughter    Meds reviewed with patient/caregiver? Yes  [New: azithromycin and prednisone.    Stopped: vit C]   Does the patient have all medications ordered at discharge? Yes   Is the patient taking all medications as directed (includes completed medication regime)? Yes   Comments PCP Dr Love. Daughter reports that the Hospital follow up appt time that was made did not work for them. Daughter plans to keep the previously scheduled reg office visit already in place for 5/9/23  3pm.    Does the patient have an appointment with their PCP within 7 days of discharge? No  [Patient has an office visit in place for 5/9/23  3pm with PCP, but no HFU type appt. ]   Nursing Interventions Confirmed date/time of appointment, Routed TCM call to PCP office   Has home health visited the patient within 72 hours of discharge? N/A   Pulse Ox monitoring None   Psychosocial issues? No   Did the patient receive a copy of their discharge instructions? Yes   Nursing interventions Reviewed instructions with patient   What is the patient's perception of their health status since discharge? Improving   If the patient is a current smoker, are they able to teach back resources for cessation? Not a smoker   Is the patient/caregiver able to teach back the hierarchy of who to call/visit for symptoms/problems? PCP, Specialist, Home health nurse, Urgent Care, ED, 911 Yes   TCM call completed? Yes   Call end time 1400   Would this patient benefit  from a Referral to [facility] Social Work? No   Is the patient interested in additional calls from an ambulatory ?  NOTE:  applies to high risk patients requiring additional follow-up. No          Deidra Mclaughlin RN    4/26/2023, 14:02 CDT

## 2023-04-28 LAB
BACTERIA SPEC AEROBE CULT: NORMAL
BACTERIA SPEC AEROBE CULT: NORMAL

## 2023-05-04 ENCOUNTER — READMISSION MANAGEMENT (OUTPATIENT)
Dept: CALL CENTER | Facility: HOSPITAL | Age: 78
End: 2023-05-04
Payer: MEDICARE

## 2023-05-04 NOTE — OUTREACH NOTE
COPD/PN Week 2 Survey    Flowsheet Row Responses   [facility] patient discharged from? [place]   Does the patient have one of the following disease processes/diagnoses(primary or secondary)? COPD   Week 2 attempt successful? Yes   Call start time 1505   Call end time 1509   Discharge diagnosis COPD with acute exacerbation   Person spoke with today (if not patient) and relationship Rita/Daughter    Meds reviewed with patient/caregiver? Yes   Is the patient having any side effects they believe may be caused by any medication additions or changes? No   Does the patient have all medications ordered at discharge? Yes   Is the patient taking all medications as directed (includes completed medication regime)? Yes   Medication comments completed antibiotic and steroid   Comments regarding appointments appt. with PCP 5/9/23 @ 3pm.   Does the patient have a primary care provider?  Yes   Does the patient have an appointment with their PCP or specialist within 7 days of discharge? Greater than 7 days   Nursing Interventions Verified appointment date/time/provider   Has the patient kept scheduled appointments due by today? N/A   Has home health visited the patient within 72 hours of discharge? N/A   Pulse Ox monitoring None   Psychosocial issues? No   Did the patient receive a copy of their discharge instructions? Yes   Nursing interventions Reviewed instructions with patient   What is the patient's perception of their health status since discharge? Improving   If the patient is a current smoker, are they able to teach back resources for cessation? Not a smoker   Is the patient/caregiver able to teach back the hierarchy of who to call/visit for symptoms/problems? PCP, Specialist, Home health nurse, Urgent Care, ED, 911 Yes   Is the patient able to teach back COPD zones? Yes   Nursing interventions Education provided on various zones   Patient reports what zone on this call? Green Zone   Green Zone Appetite is good,  Slept well last night   Green Zone interventions: Take daily medications, At all times avoid cigarette smoking, vaping and inhaled irritants, Continue regular exercise/diet plan   Week 2 call completed? Yes   Is the patient interested in additional calls from an ambulatory ?  NOTE:  applies to high risk patients requiring additional follow-up. No   Wrap up additional comments Daughter reports pt. breathing better but sleeping a lot. Advised any worsening symptoms go to ED. Any questions contact PCP office, dylanr. v/han JUAN - Registered Nurse

## 2023-05-09 ENCOUNTER — OFFICE VISIT (OUTPATIENT)
Dept: FAMILY MEDICINE CLINIC | Facility: CLINIC | Age: 78
End: 2023-05-09
Payer: MEDICARE

## 2023-05-09 VITALS
OXYGEN SATURATION: 95 % | WEIGHT: 197.8 LBS | BODY MASS INDEX: 37.34 KG/M2 | SYSTOLIC BLOOD PRESSURE: 134 MMHG | DIASTOLIC BLOOD PRESSURE: 68 MMHG | HEIGHT: 61 IN | HEART RATE: 70 BPM | TEMPERATURE: 97.8 F

## 2023-05-09 DIAGNOSIS — J44.9 CHRONIC OBSTRUCTIVE PULMONARY DISEASE, UNSPECIFIED COPD TYPE: Primary | ICD-10-CM

## 2023-05-09 RX ORDER — BUDESONIDE AND FORMOTEROL FUMARATE DIHYDRATE 160; 4.5 UG/1; UG/1
2 AEROSOL RESPIRATORY (INHALATION)
Qty: 10.2 G | Refills: 12 | Status: SHIPPED | OUTPATIENT
Start: 2023-05-09

## 2023-05-09 NOTE — PROGRESS NOTES
Transitional Care Follow Up Visit  Subjective     Pennie Sophia Gibson is a 78 y.o. female who presents for a transitional care management visit.    Within 48 business hours after discharge our office contacted her via telephone to coordinate her care and needs.      I reviewed and discussed the details of that call along with the discharge summary, hospital problems, inpatient lab results, inpatient diagnostic studies, and consultation reports with Pennie.     Current outpatient and discharge medications have been reconciled for the patient.  Reviewed by: Isaac Love MD          4/25/2023     6:39 PM   Date of TCM Phone Call   [facility]   Date of Admission 4/23/2023   Date of Discharge 4/25/2023   Discharge Disposition Home or Self Care     Risk for Readmission (LACE) Score: 9 (4/25/2023  5:00 AM)      History of Present Illness   Course During Hospital Stay:  Went to [place] ER 4/23/23 due to shortness of breath.  Patient ultimately diagnosed with COPD exacerbation, stable for discharge however did have elevated D-dimer and unfortunately her IV infiltrated during CTA.  Did have negative VQ scan the following day and was discharged home.  States that since discharge, symptoms have been improved, denies any fever, chills, hemoptysis, productive cough, chest pain.     Objective   Physical Exam  Constitutional:       Appearance: Normal appearance.   HENT:      Head: Normocephalic and atraumatic.      Right Ear: External ear normal.      Left Ear: External ear normal.   Eyes:      General:         Right eye: No discharge.         Left eye: No discharge.      Conjunctiva/sclera: Conjunctivae normal.   Cardiovascular:      Rate and Rhythm: Normal rate and regular rhythm.      Pulses: Normal pulses.      Heart sounds: Normal heart sounds. No murmur heard.  Pulmonary:      Effort: Pulmonary effort is normal. No respiratory distress.      Breath sounds: Rhonchi present.    Abdominal:      General: There is no distension.      Palpations: Abdomen is soft.      Tenderness: There is no abdominal tenderness.   Musculoskeletal:      Cervical back: Normal range of motion.      Right lower leg: No edema.      Left lower leg: No edema.   Lymphadenopathy:      Cervical: No cervical adenopathy.   Neurological:      Mental Status: She is alert. Mental status is at baseline.   Psychiatric:         Mood and Affect: Mood normal.         Behavior: Behavior normal.         Assessment & Plan   Problems Addressed this Visit    None  Visit Diagnoses     Chronic obstructive pulmonary disease, unspecified COPD type    -  Primary    Relevant Medications    budesonide-formoterol (Symbicort) 160-4.5 MCG/ACT inhaler      Diagnoses       Codes Comments    Chronic obstructive pulmonary disease, unspecified COPD type    -  Primary ICD-10-CM: J44.9  ICD-9-CM: 496         Hospital discharge follow-up; as above, unclear diagnosis COPD, is currently seeing pulmonology, will defer to them.  Patient has improved, vital stable, exam benign.  Counseled on importance of daily inhaler use for symptom control, suspect medication nonadherence, inappropriate use of inhaler major contributing factor.  Will increase Symbicort as above, given strict ER/return precautions, and encouraged follow-up with pulmonology.

## 2023-05-09 NOTE — PROGRESS NOTES
"Subjective:  Pennie Gibson is a 78 y.o. female who presents for       Patient Active Problem List   Diagnosis   • Seasonal allergies   • Acute UTI   • Congestive heart failure   • Nonrheumatic mitral valve regurgitation   • Nonrheumatic tricuspid valve regurgitation   • Sick sinus syndrome   • Cardiac pacemaker in situ   • Atrial fibrillation   • Gout   • Chronic kidney disease   • Moderate persistent asthma without complication   • Complications, mechanical, pacemaker, cardiac   • Class 2 obesity due to excess calories without serious comorbidity with body mass index (BMI) of 38.0 to 38.9 in adult   • Acute bronchitis due to COVID-19 virus   • Dyspnea, unspecified type   • COPD with acute exacerbation   • Positive D dimer   • Intravenous infiltration     Vitals:    Vitals:    05/09/23 1554   BP: 134/68   BP Location: Right arm   Patient Position: Sitting   Cuff Size: Large Adult   Pulse: 70   Temp: 97.8 °F (36.6 °C)   TempSrc: Oral   SpO2: 95%   Weight: 89.7 kg (197 lb 12.8 oz)   Height: 154.9 cm (60.98\")     Body mass index is 37.39 kg/m².      Current Outpatient Medications:   •  albuterol sulfate  (90 Base) MCG/ACT inhaler, Inhale 2 puffs Every 4 (Four) Hours As Needed for Wheezing., Disp: 18 g, Rfl: 3  •  allopurinol (ZYLOPRIM) 100 MG tablet, Take 1 tablet by mouth once daily, Disp: 90 tablet, Rfl: 0  •  aspirin (aspirin) 81 MG EC tablet, Take 1 tablet by mouth Daily., Disp: 90 tablet, Rfl: 1  •  atorvastatin (LIPITOR) 20 MG tablet, Take 1 tablet by mouth once daily, Disp: 90 tablet, Rfl: 0  •  calcium carbonate (OS-MICHELL) 600 MG tablet, Take 1 tablet by mouth Daily., Disp: , Rfl:   •  Cranberry 1000 MG capsule, Take 4,200 mg by mouth Daily., Disp: , Rfl:   •  Cyanocobalamin (VITAMIN B 12 PO), Take 1,000 mcg by mouth Daily., Disp: , Rfl:   •  Eliquis 5 MG tablet tablet, TAKE 1 TABLET BY MOUTH EVERY 12 HOURS, Disp: 180 tablet, Rfl: 0  •  Garlic 1000 MG capsule, Take 1 capsule by mouth Daily., Disp: " , Rfl:   •  lisinopril (PRINIVIL,ZESTRIL) 20 MG tablet, Take 0.5 tablets by mouth Daily., Disp: 90 tablet, Rfl: 0  •  metoprolol tartrate (LOPRESSOR) 100 MG tablet, Take 1 tablet by mouth twice daily, Disp: 180 tablet, Rfl: 0  •  montelukast (Singulair) 10 MG tablet, Take 1 tablet by mouth Every Night., Disp: 90 tablet, Rfl: 1  •  Symbicort 80-4.5 MCG/ACT inhaler, Inhale 2 puffs 2 (Two) Times a Day., Disp: 6.9 g, Rfl: 12    Patient Active Problem List   Diagnosis   • Seasonal allergies   • Acute UTI   • Congestive heart failure   • Nonrheumatic mitral valve regurgitation   • Nonrheumatic tricuspid valve regurgitation   • Sick sinus syndrome   • Cardiac pacemaker in situ   • Atrial fibrillation   • Gout   • Chronic kidney disease   • Moderate persistent asthma without complication   • Complications, mechanical, pacemaker, cardiac   • Class 2 obesity due to excess calories without serious comorbidity with body mass index (BMI) of 38.0 to 38.9 in adult   • Acute bronchitis due to COVID-19 virus   • Dyspnea, unspecified type   • COPD with acute exacerbation   • Positive D dimer   • Intravenous infiltration     Past Surgical History:   Procedure Laterality Date   • BLADDER SURGERY     • CARDIAC ELECTROPHYSIOLOGY PROCEDURE N/A 2022    Procedure: PPM generator change - dual;  Surgeon: Shiloh Medrano MD;  Location: [facility] INVASIVE LOCATION;  Service: Cardiology;  Laterality: N/A;   • CARDIAC SURGERY     • COLON RESECTION      x 2 small bowel   • HYSTERECTOMY     • PACEMAKER IMPLANTATION     • VAGINAL BIOPSY       Social History     Socioeconomic History   • Marital status:    Tobacco Use   • Smoking status: Former     Packs/day: 2.00     Years: 45.00     Pack years: 90.00     Types: Cigarettes     Start date: 3/12/1953     Quit date: 3/12/1993     Years since quittin.1   • Smokeless tobacco: Never   Vaping Use   • Vaping Use: Never used   Substance and Sexual Activity   • Alcohol use: Not Currently    • Drug use: Never   • Sexual activity: Defer     Family History   Problem Relation Age of Onset   • Cancer Mother    • Diabetes Father    • Diabetes Sister    • Diabetes Brother      Admission on 04/23/2023, Discharged on 04/25/2023   Component Date Value Ref Range Status   • QT Interval 04/23/2023 442  ms Preliminary   • QTC Interval 04/23/2023 444  ms Preliminary   • Glucose 04/23/2023 186 (H)  65 - 99 mg/dL Final   • BUN 04/23/2023 16  8 - 23 mg/dL Final   • Creatinine 04/23/2023 1.36 (H)  0.57 - 1.00 mg/dL Final   • Sodium 04/23/2023 139  136 - 145 mmol/L Final   • Potassium 04/23/2023 4.8  3.5 - 5.2 mmol/L Final    Slight hemolysis detected by analyzer. Results may be affected.   • Chloride 04/23/2023 108 (H)  98 - 107 mmol/L Final   • CO2 04/23/2023 20.0 (L)  22.0 - 29.0 mmol/L Final   • Calcium 04/23/2023 9.4  8.6 - 10.5 mg/dL Final   • Total Protein 04/23/2023 6.0  6.0 - 8.5 g/dL Final   • Albumin 04/23/2023 3.6  3.5 - 5.2 g/dL Final   • ALT (SGPT) 04/23/2023 16  1 - 33 U/L Final   • AST (SGOT) 04/23/2023 28  1 - 32 U/L Final    Slight hemolysis detected by analyzer. Results may be affected.   • Alkaline Phosphatase 04/23/2023 85  39 - 117 U/L Final   • Total Bilirubin 04/23/2023 0.3  0.0 - 1.2 mg/dL Final   • Globulin 04/23/2023 2.4  gm/dL Final   • A/G Ratio 04/23/2023 1.5  g/dL Final   • BUN/Creatinine Ratio 04/23/2023 11.8  7.0 - 25.0 Final   • Anion Gap 04/23/2023 11.0  5.0 - 15.0 mmol/L Final   • eGFR 04/23/2023 39.5 (L)  >60.0 mL/min/1.73 Final   • proBNP 04/23/2023 916.1  0.0 - 1,800.0 pg/mL Final   • HS Troponin T 04/23/2023 10 (H)  <10 ng/L Final   • Blood Culture 04/23/2023 No growth at 5 days   Final   • Blood Culture 04/23/2023 No growth at 5 days   Final   • Lactate 04/23/2023 2.5 (C)  0.5 - 2.0 mmol/L Final   • Extra Tube 04/23/2023 Hold for add-ons.   Final    Auto resulted.   • Extra Tube 04/23/2023 hold for add-on   Final    Auto resulted   • Extra Tube 04/23/2023 Hold for add-ons.    Final    Auto resulted.   • Extra Tube 04/23/2023 Hold for add-ons.   Final    Auto resulted   • WBC 04/23/2023 10.20  3.40 - 10.80 10*3/mm3 Final   • RBC 04/23/2023 4.55  3.77 - 5.28 10*6/mm3 Final   • Hemoglobin 04/23/2023 13.4  12.0 - 15.9 g/dL Final   • Hematocrit 04/23/2023 41.0  34.0 - 46.6 % Final   • MCV 04/23/2023 90.1  79.0 - 97.0 fL Final   • MCH 04/23/2023 29.5  26.6 - 33.0 pg Final   • MCHC 04/23/2023 32.7  31.5 - 35.7 g/dL Final   • RDW 04/23/2023 14.6  12.3 - 15.4 % Final   • RDW-SD 04/23/2023 48.0  37.0 - 54.0 fl Final   • MPV 04/23/2023 9.5  6.0 - 12.0 fL Final   • Platelets 04/23/2023 215  140 - 450 10*3/mm3 Final   • Neutrophil % 04/23/2023 70.5  42.7 - 76.0 % Final   • Lymphocyte % 04/23/2023 23.1  19.6 - 45.3 % Final   • Monocyte % 04/23/2023 4.9 (L)  5.0 - 12.0 % Final   • Eosinophil % 04/23/2023 0.6  0.3 - 6.2 % Final   • Basophil % 04/23/2023 0.6  0.0 - 1.5 % Final   • Immature Grans % 04/23/2023 0.3  0.0 - 0.5 % Final   • Neutrophils, Absolute 04/23/2023 7.19 (H)  1.70 - 7.00 10*3/mm3 Final   • Lymphocytes, Absolute 04/23/2023 2.36  0.70 - 3.10 10*3/mm3 Final   • Monocytes, Absolute 04/23/2023 0.50  0.10 - 0.90 10*3/mm3 Final   • Eosinophils, Absolute 04/23/2023 0.06  0.00 - 0.40 10*3/mm3 Final   • Basophils, Absolute 04/23/2023 0.06  0.00 - 0.20 10*3/mm3 Final   • Immature Grans, Absolute 04/23/2023 0.03  0.00 - 0.05 10*3/mm3 Final   • nRBC 04/23/2023 0.0  0.0 - 0.2 /100 WBC Final   • COVID19 04/23/2023 Not Detected  Not Detected - Ref. Range Final   • Influenza A PCR 04/23/2023 Not Detected  Not Detected Final   • Influenza B PCR 04/23/2023 Not Detected  Not Detected Final   • Lactate 04/23/2023 2.2 (C)  0.5 - 2.0 mmol/L Final   • D-Dimer, Quantitative 04/23/2023 >20,000 (H)  0 - 800 ng/mL (FEU) Final   • Color, UA 04/25/2023 Yellow  Yellow, Straw, Dark Yellow, [place] Final   • Appearance, UA 04/25/2023 Clear  Clear Final   • pH, UA 04/25/2023 6.5  5.0 - 9.0 Final   • Specific Fletcher, UA  04/25/2023 1.013  1.003 - 1.030 Final   • Glucose, UA 04/25/2023 Negative  Negative Final   • Ketones, UA 04/25/2023 Negative  Negative Final   • Bilirubin, UA 04/25/2023 Negative  Negative Final   • Blood, UA 04/25/2023 Negative  Negative Final   • Protein, UA 04/25/2023 Negative  Negative Final   • Leuk Esterase, UA 04/25/2023 Moderate (2+) (A)  Negative Final   • Nitrite, UA 04/25/2023 Negative  Negative Final   • Urobilinogen, UA 04/25/2023 0.2 E.U./dL  0.2 - 1.0 E.U./dL Final   • Lactate 04/23/2023 2.9 (C)  0.5 - 2.0 mmol/L Final   • Glucose 04/24/2023 149 (H)  65 - 99 mg/dL Final   • BUN 04/24/2023 15  8 - 23 mg/dL Final   • Creatinine 04/24/2023 1.24 (H)  0.57 - 1.00 mg/dL Final   • Sodium 04/24/2023 143  136 - 145 mmol/L Final   • Potassium 04/24/2023 4.3  3.5 - 5.2 mmol/L Final   • Chloride 04/24/2023 111 (H)  98 - 107 mmol/L Final   • CO2 04/24/2023 22.0  22.0 - 29.0 mmol/L Final   • Calcium 04/24/2023 9.2  8.6 - 10.5 mg/dL Final   • BUN/Creatinine Ratio 04/24/2023 12.1  7.0 - 25.0 Final   • Anion Gap 04/24/2023 10.0  5.0 - 15.0 mmol/L Final   • eGFR 04/24/2023 44.1 (L)  >60.0 mL/min/1.73 Final   • WBC 04/24/2023 11.81 (H)  3.40 - 10.80 10*3/mm3 Final   • RBC 04/24/2023 3.79  3.77 - 5.28 10*6/mm3 Final   • Hemoglobin 04/24/2023 11.2 (L)  12.0 - 15.9 g/dL Final   • Hematocrit 04/24/2023 34.1  34.0 - 46.6 % Final   • MCV 04/24/2023 90.0  79.0 - 97.0 fL Final   • MCH 04/24/2023 29.6  26.6 - 33.0 pg Final   • MCHC 04/24/2023 32.8  31.5 - 35.7 g/dL Final   • RDW 04/24/2023 14.7  12.3 - 15.4 % Final   • RDW-SD 04/24/2023 48.5  37.0 - 54.0 fl Final   • MPV 04/24/2023 9.6  6.0 - 12.0 fL Final   • Platelets 04/24/2023 240  140 - 450 10*3/mm3 Final   • Neutrophil % 04/24/2023 56.6  42.7 - 76.0 % Final   • Lymphocyte % 04/24/2023 35.0  19.6 - 45.3 % Final   • Monocyte % 04/24/2023 6.4  5.0 - 12.0 % Final   • Eosinophil % 04/24/2023 0.8  0.3 - 6.2 % Final   • Basophil % 04/24/2023 0.9  0.0 - 1.5 % Final   • Immature  Grans % 04/24/2023 0.3  0.0 - 0.5 % Final   • Neutrophils, Absolute 04/24/2023 6.70  1.70 - 7.00 10*3/mm3 Final   • Lymphocytes, Absolute 04/24/2023 4.13 (H)  0.70 - 3.10 10*3/mm3 Final   • Monocytes, Absolute 04/24/2023 0.75  0.10 - 0.90 10*3/mm3 Final   • Eosinophils, Absolute 04/24/2023 0.09  0.00 - 0.40 10*3/mm3 Final   • Basophils, Absolute 04/24/2023 0.11  0.00 - 0.20 10*3/mm3 Final   • Immature Grans, Absolute 04/24/2023 0.03  0.00 - 0.05 10*3/mm3 Final   • nRBC 04/24/2023 0.0  0.0 - 0.2 /100 WBC Final   • Lactate 04/24/2023 1.7  0.5 - 2.0 mmol/L Final   • Lactate 04/24/2023 1.9  0.5 - 2.0 mmol/L Final   • Procalcitonin 04/24/2023 0.06  0.00 - 0.25 ng/mL Final   • Glucose 04/25/2023 146 (H)  65 - 99 mg/dL Final   • BUN 04/25/2023 16  8 - 23 mg/dL Final   • Creatinine 04/25/2023 1.39 (H)  0.57 - 1.00 mg/dL Final   • Sodium 04/25/2023 140  136 - 145 mmol/L Final   • Potassium 04/25/2023 5.3 (H)  3.5 - 5.2 mmol/L Final   • Chloride 04/25/2023 109 (H)  98 - 107 mmol/L Final   • CO2 04/25/2023 20.0 (L)  22.0 - 29.0 mmol/L Final   • Calcium 04/25/2023 10.1  8.6 - 10.5 mg/dL Final   • BUN/Creatinine Ratio 04/25/2023 11.5  7.0 - 25.0 Final   • Anion Gap 04/25/2023 11.0  5.0 - 15.0 mmol/L Final   • eGFR 04/25/2023 38.4 (L)  >60.0 mL/min/1.73 Final   • Lactate 04/25/2023 1.9  0.5 - 2.0 mmol/L Final   • Extra Tube 04/25/2023 hold for add-on   Final    Auto resulted   • RBC, UA 04/25/2023 0-2 (A)  None Seen /HPF Final   • WBC, UA 04/25/2023 6-12 (A)  None Seen, 0-2, 3-5 /HPF Final   • Bacteria, UA 04/25/2023 None Seen  None Seen /HPF Final   • Squamous Epithelial Cells, UA 04/25/2023 3-5 (A)  None Seen, 0-2 /HPF Final   • Hyaline Casts, UA 04/25/2023 None Seen  None Seen /LPF Final   • Methodology 04/25/2023 Automated Microscopy   Final   Office Visit on 02/23/2023   Component Date Value Ref Range Status   • SARS Antigen 02/23/2023 Detected (A)  Not Detected, Presumptive Negative Final   • Influenza A Antigen RAUL  02/23/2023 Not Detected  Not Detected Final   • Influenza B Antigen RAUL 02/23/2023 Not Detected  Not Detected Final   • Internal Control 02/23/2023 Passed  Passed Final   • Lot Number 02/23/2023 [id number]   Final   • Expiration Date 02/23/2023 03/11/2023   Final   Lab on 01/16/2023   Component Date Value Ref Range Status   • TSH 01/16/2023 1.050  0.270 - 4.200 uIU/mL Final   Admission on 12/31/2022, Discharged on 12/31/2022   Component Date Value Ref Range Status   • QT Interval 12/31/2022 444  ms Final   • QTC Interval 12/31/2022 444  ms Final   • COVID19 12/31/2022 Not Detected  Not Detected - Ref. Range Final   • Influenza A PCR 12/31/2022 Not Detected  Not Detected Final   • Influenza B PCR 12/31/2022 Not Detected  Not Detected Final   • Glucose 12/31/2022 105 (H)  65 - 99 mg/dL Final   • BUN 12/31/2022 18  8 - 23 mg/dL Final   • Creatinine 12/31/2022 1.44 (H)  0.57 - 1.00 mg/dL Final   • Sodium 12/31/2022 140  136 - 145 mmol/L Final   • Potassium 12/31/2022 5.1  3.5 - 5.2 mmol/L Final   • Chloride 12/31/2022 106  98 - 107 mmol/L Final   • CO2 12/31/2022 23.0  22.0 - 29.0 mmol/L Final   • Calcium 12/31/2022 9.6  8.6 - 10.5 mg/dL Final   • Total Protein 12/31/2022 6.3  6.0 - 8.5 g/dL Final   • Albumin 12/31/2022 3.6  3.5 - 5.2 g/dL Final   • ALT (SGPT) 12/31/2022 13  1 - 33 U/L Final   • AST (SGOT) 12/31/2022 25  1 - 32 U/L Final   • Alkaline Phosphatase 12/31/2022 83  39 - 117 U/L Final   • Total Bilirubin 12/31/2022 0.6  0.0 - 1.2 mg/dL Final   • Globulin 12/31/2022 2.7  gm/dL Final   • A/G Ratio 12/31/2022 1.3  g/dL Final   • BUN/Creatinine Ratio 12/31/2022 12.5  7.0 - 25.0 Final   • Anion Gap 12/31/2022 11.0  5.0 - 15.0 mmol/L Final   • eGFR 12/31/2022 37.1 (L)  >60.0 mL/min/1.73 Final    National Kidney Foundation and American Society of Nephrology (ASN) Task Force recommended calculation based on the Chronic Kidney Disease Epidemiology Collaboration (CKD-EPI) equation refit without adjustment for race.   •  proBNP 12/31/2022 562.9  0.0 - 1,800.0 pg/mL Final   • WBC 12/31/2022 9.23  3.40 - 10.80 10*3/mm3 Final   • RBC 12/31/2022 4.63  3.77 - 5.28 10*6/mm3 Final   • Hemoglobin 12/31/2022 13.6  12.0 - 15.9 g/dL Final   • Hematocrit 12/31/2022 40.8  34.0 - 46.6 % Final   • MCV 12/31/2022 88.1  79.0 - 97.0 fL Final   • MCH 12/31/2022 29.4  26.6 - 33.0 pg Final   • MCHC 12/31/2022 33.3  31.5 - 35.7 g/dL Final   • RDW 12/31/2022 13.1  12.3 - 15.4 % Final   • RDW-SD 12/31/2022 41.1  37.0 - 54.0 fl Final   • MPV 12/31/2022 9.2  6.0 - 12.0 fL Final   • Platelets 12/31/2022 218  140 - 450 10*3/mm3 Final   • Neutrophil % 12/31/2022 41.7 (L)  42.7 - 76.0 % Final   • Lymphocyte % 12/31/2022 42.7  19.6 - 45.3 % Final   • Monocyte % 12/31/2022 10.1  5.0 - 12.0 % Final   • Eosinophil % 12/31/2022 4.2  0.3 - 6.2 % Final   • Basophil % 12/31/2022 1.1  0.0 - 1.5 % Final   • Immature Grans % 12/31/2022 0.2  0.0 - 0.5 % Final   • Neutrophils, Absolute 12/31/2022 3.85  1.70 - 7.00 10*3/mm3 Final   • Lymphocytes, Absolute 12/31/2022 3.94 (H)  0.70 - 3.10 10*3/mm3 Final   • Monocytes, Absolute 12/31/2022 0.93 (H)  0.10 - 0.90 10*3/mm3 Final   • Eosinophils, Absolute 12/31/2022 0.39  0.00 - 0.40 10*3/mm3 Final   • Basophils, Absolute 12/31/2022 0.10  0.00 - 0.20 10*3/mm3 Final   • Immature Grans, Absolute 12/31/2022 0.02  0.00 - 0.05 10*3/mm3 Final   • nRBC 12/31/2022 0.0  0.0 - 0.2 /100 WBC Final      NM Lung Ventilation Perfusion  Narrative: Exam:  Ventilation perfusion scan.    History:  Elevated D-dimer.  Shortness of breath.    Comparison:  Two-view chest 04/23/2023.    Technique:  33.0 mCi of Tc99m-DTPA was dispensed in a nebulizer to administer 1 mCi of Tc99m  DTPA by inhalation and multiple static images of both lungs were taken in  different projections.  Subsequently, the patient was injected with 6.6 mCi  Tc99m-MAA and static images of both lungs were repeated in the same projections.    Findings:  Mildly heterogenous distribution of  isotope throughout the lungs of both the  ventilation perfusion components of the exam.  This is more pronounced on the  ventilation component.  There is also some clumping in the central airways on  the ventilatory images, which is suggestive of turbulent flow, often seen in the  setting of COPD.  No moderate to large ventilation perfusion mismatches are  identified.  Impression: Low probability for pulmonary embolism.      [unfilled]  Immunization History   Administered Date(s) Administered   • COVID-19 (MODERNA) 1st,2nd,3rd Dose Monovalent 09/25/2021, 10/23/2021   • Fluzone High-Dose 65+yrs 12/20/2021   • Influenza, Unspecified 01/10/2018   • Pneumococcal Conjugate 20-Valent (PCV20) 02/16/2023   • Pneumococcal Polysaccharide (PPSV23) 02/07/2022   • Pneumococcal, Unspecified 01/01/2013   • Tdap 12/20/2021     The following portions of the patient's history were reviewed and updated as appropriate: allergies, current medications, past family history, past medical history, past social history, past surgical history and problem list.    PHQ-9 Total Score:             Physical Exam    Assessment & Plan   No diagnosis found.   No orders of the defined types were placed in this encounter.              This document has been electronically signed by Isaac Love MD on May 9, 2023 16:19 CDT    EMR Dragon/Transciption Disclaimer: Some of this note may be an electronic transcription/translation of spoken language to printed text.  The electronic translation of spoken language may permit erroneous, or at times, nonsensical words or phrases to be inadvertently transcribed. Although I have reviewed the note for such errors, some may still exist.

## 2023-05-22 LAB
QT INTERVAL: 442 MS
QTC INTERVAL: 444 MS

## 2023-05-23 ENCOUNTER — OFFICE VISIT (OUTPATIENT)
Dept: PULMONOLOGY | Facility: CLINIC | Age: 78
End: 2023-05-23
Payer: MEDICARE

## 2023-05-23 VITALS
DIASTOLIC BLOOD PRESSURE: 74 MMHG | HEIGHT: 61 IN | BODY MASS INDEX: 37.19 KG/M2 | SYSTOLIC BLOOD PRESSURE: 124 MMHG | HEART RATE: 63 BPM | OXYGEN SATURATION: 99 % | WEIGHT: 197 LBS

## 2023-05-23 DIAGNOSIS — J45.40 MODERATE PERSISTENT ASTHMA WITHOUT COMPLICATION: Primary | ICD-10-CM

## 2023-05-23 NOTE — PROGRESS NOTES
"    Pulmonary Office Visit    Thank you for asking me to see Pennie Gibson for   Chief Complaint   Patient presents with   • Asthma       History of Present Illness    5/23/23: returns for 3 month follow up  Which is also a hospital follow up that we were not notified of. She was hospitalized on 4/23-4/25 for dyspnea. She was discharged on Prednisone and Azithromycin and diagnosed with COPD exacerbation. However, she does not have COPD, she has history of asthma with normal PFTs and is on Symbicort for which she uses PRN versus how it is prescribed. She also has not started the 160mcg ordered last fall, because she has \"lots of 80mcg\" at home.   She presented to the ER for a cough x1 week and dyspnea. She stayed overnight due to her IV infiltrating during her CTA.       2/10/23: Returns for 3 month follow up for asthma.  She continues to use her symbicort 80mcg twice a day. She is not using her Albuterol much and probably should be. I will get her a spacer. She continues with SALEH. This has been stable for the past 6 months. Of note, she still has her birds in the house.   Ct reviewed by Dr. Corrigan, minimal lymph node enlargement. Eosinophils normal. BNP continues to be normal.       10/31/22: Returns for 2 week follow up for asthma. She was not using her Symbicort correctly at our last visit. She is now using her symbicort twice a day with the help of Casie jovanni. She had breakfast at [employer] last week for which she was busy and active. She was winded but did not have to use albuterol. She would not have been able to do that a month ago. I had intended to increase her to 160mcg Symbicort, but she has so many 80mcg she wanted to stay with that dose. I corrected that script today.       10/7/22: Here sooner than scheduled appointment at request of patients daughter following two ER visits on 9/24 and 9/30. She received a Doxycycline and medrol on the 24th and cough medication on the 30th. She reached out " "to her PCP without response thus far, so she came here.   She is on Symbicort 80 and Albuterol HFA PRN. She has asthma history. She has been on these since 5/2022.   ER notes reveal and WBC increase from 11 to 19, that could be from Medrol pack but most likely from infectious process. Does not appear to be a lung source with no sputum production.     Of note, she does have valve disease and AF that could be contributing to her dyspnea on exertion. BNP negative.      She has fairly normal PFTs without obstruction at her initial visit. She does not have COPD. She may have a presentation of Asthma, but history was hard to obtain from her prior life in [place]. She said her and her sister always had asthma.    She only complains of a mild cough when taking deep breath. Denies any wheezing. Unable to take a deep breath.   When questioned further, she has only been using her inhaler every once and a while. She does not want to \"be dependent upon an inhaler to breathe\". I did my best to explain to her that if this is in fact an asthma exacerbation, that she will need inhalers and steroids to improve. Not taking her inhalers and then being seen again for dyspnea is a counterproductive visit. Her daughter agreed.   I asked her to demonstrate her inhaler use. She is taking 2 puffs at once versus one at a time. We went over proper technique.   I had her blow on a flow meter. She achieved 1.55, 1.77, 1.65L. She is not red zone.       Dr. Yao's last 2 visits: \"8/15/22  Patient here for follow up. At last visit, I started her on symbicort for asthma. She is doing well with it. Feels like her breathing is fine, but also tells me she can't remember what her breathing was like before she started the inhaler. She gets winded with physical activity but recovers quickly      5/13/22  Patient referred from PCM for \"COPD\". No notes in the chart with further elaboration on this     Patient is here with her daughter. They say she was " "diagnosed with asthma years ago. She has a rescue inhaler but that's all, and doesn't use it because she doesn't like depending on it. Not clear if it really helps or not. Doesn't recall being on any controller meds. Feells like her breathing has gotten a little worse lately but not really sure over what time frame.     She moved from [place] about a year ago. Does notice her breathing is worse in the humidity     1 dog and 3 birds. Birds are new in the last year. Cockatiel, canary and parakeet\"    Trouble with inhaler use/spacer need:  No spacer needed. Taking 2 puffs at once versus one at a time.     ER visits/hospitalization/exacerbations at PCP/[facility]: 22 and 22    Tobacco use history:  Social History     Socioeconomic History   • Marital status:    Tobacco Use   • Smoking status: Former     Packs/day: 2.00     Years: 45.00     Pack years: 90.00     Types: Cigarettes     Start date: 3/12/1955     Quit date: 3/12/1993     Years since quittin.2     Passive exposure: Past   • Smokeless tobacco: Never   Vaping Use   • Vaping Use: Never used   Substance and Sexual Activity   • Alcohol use: Not Currently   • Drug use: Never   • Sexual activity: Defer       Review of Systems: History obtained from chart review and the patient.  Review of Systems    As described in the HPI. Otherwise, remainder of ROS (14 systems) were negative.    Patient Active Problem List   Diagnosis   • Seasonal allergies   • Acute UTI   • Congestive heart failure   • Nonrheumatic mitral valve regurgitation   • Nonrheumatic tricuspid valve regurgitation   • Sick sinus syndrome   • Cardiac pacemaker in situ   • Atrial fibrillation   • Gout   • Chronic kidney disease   • Moderate persistent asthma without complication   • Complications, mechanical, pacemaker, cardiac   • Class 2 obesity due to excess calories without serious comorbidity with body mass index (BMI) of 38.0 to 38.9 in adult   • Acute bronchitis due to COVID-19 virus " "  • Dyspnea, unspecified type   • COPD with acute exacerbation   • Positive D dimer   • Intravenous infiltration         Current Outpatient Medications:   •  albuterol sulfate  (90 Base) MCG/ACT inhaler, Inhale 2 puffs Every 4 (Four) Hours As Needed for Wheezing., Disp: 18 g, Rfl: 3  •  allopurinol (ZYLOPRIM) 100 MG tablet, Take 1 tablet by mouth once daily, Disp: 90 tablet, Rfl: 0  •  aspirin (aspirin) 81 MG EC tablet, Take 1 tablet by mouth Daily., Disp: 90 tablet, Rfl: 1  •  atorvastatin (LIPITOR) 20 MG tablet, Take 1 tablet by mouth once daily, Disp: 90 tablet, Rfl: 0  •  budesonide-formoterol (Symbicort) 160-4.5 MCG/ACT inhaler, Inhale 2 puffs 2 (Two) Times a Day., Disp: 10.2 g, Rfl: 12  •  calcium carbonate (OS-MICHELL) 600 MG tablet, Take 1 tablet by mouth Daily., Disp: , Rfl:   •  Cranberry 1000 MG capsule, Take 4,200 mg by mouth Daily., Disp: , Rfl:   •  Cyanocobalamin (VITAMIN B 12 PO), Take 1,000 mcg by mouth Daily., Disp: , Rfl:   •  Eliquis 5 MG tablet tablet, TAKE 1 TABLET BY MOUTH EVERY 12 HOURS, Disp: 180 tablet, Rfl: 0  •  Garlic 1000 MG capsule, Take 1 capsule by mouth Daily., Disp: , Rfl:   •  lisinopril (PRINIVIL,ZESTRIL) 20 MG tablet, Take 0.5 tablets by mouth Daily., Disp: 90 tablet, Rfl: 0  •  metoprolol tartrate (LOPRESSOR) 100 MG tablet, Take 1 tablet by mouth twice daily, Disp: 180 tablet, Rfl: 0  •  montelukast (Singulair) 10 MG tablet, Take 1 tablet by mouth Every Night., Disp: 90 tablet, Rfl: 1    Allergies   Allergen Reactions   • Penicillins Rash       Advance Care Planning   ACP discussion was declined by the patient. Patient does not have an advance directive, declines further assistance.          Objective     Vitals:    05/23/23 0810 05/23/23 0839   BP: 124/74    Pulse: 63    SpO2: 93% 99%   Weight: 89.4 kg (197 lb)    Height: 154.9 cm (61\")        Physical Exam  Cardiovascular:      Rate and Rhythm: Normal rate and regular rhythm.      Heart sounds: Normal heart sounds. " "  Pulmonary:      Effort: Pulmonary effort is normal.      Breath sounds: Normal breath sounds.   Neurological:      Mental Status: She is alert and oriented to person, place, and time.   Psychiatric:         Mood and Affect: Mood normal.         Behavior: Behavior normal.         Thought Content: Thought content normal.         Judgment: Judgment normal.         PFTs              Radiology (independently reviewed by me, interpreted by shayy)    NM Lung Ventilation Perfusion    Result Date: 4/24/2023  Low probability for pulmonary embolism.    US Venous Doppler Lower Extremity Bilateral (duplex)    Result Date: 4/23/2023  No evidence of DVT seen in  bilateral lower extremity.         Assessment       Discussion/ Recommendations:    Diagnosis Plan   1. Moderate persistent asthma without complication  - continue Symbicort 160mcg 2 puffs BID., I tried to increase her at past visits, and she wouldn't pay for the increased dose because she has \"lots of 80mcg at home\".  Today, they still brought me the 80mcg and she is still using incorrectly with 2 puffs at 1 time versus 1 at a time.     - Allegra for rhinnorhea     - Albuterol as needed for dyspnea and cough.  - she now has nebulizers at home to use for dyspnea;.     - Return demonstration and education preformed for inhaler.   - Encouraged medication compliance including inhalers     - she responded well to Prednisone taper.        BMI is >= 30 and <35. (Class 1 Obesity). The following options were offered after discussion;: exercise counseling/recommendations      Follow up: 6 months Asthma.       I spent 45 minutes caring for Pennie on this date of service. This time includes time spent by me in the following activities: preparing for the visit, reviewing tests, obtaining and/or reviewing a separately obtained history, performing a medically appropriate examination and/or evaluation, counseling and educating the patient/family/caregiver, ordering medications, " tests, or procedures, referring and communicating with other health care professionals, documenting information in the medical record, independently interpreting results and communicating that information with the patient/family/caregiver and care coordination            This document has been electronically signed by DEN David on May 23, 2023 09:20 CDT

## 2023-05-24 RX ORDER — ATORVASTATIN CALCIUM 20 MG/1
TABLET, FILM COATED ORAL
Qty: 90 TABLET | Refills: 0 | Status: SHIPPED | OUTPATIENT
Start: 2023-05-24

## 2023-05-24 RX ORDER — METOPROLOL TARTRATE 100 MG/1
TABLET ORAL
Qty: 180 TABLET | Refills: 0 | Status: SHIPPED | OUTPATIENT
Start: 2023-05-24

## 2023-06-19 DIAGNOSIS — M1A.9XX0 CHRONIC GOUT WITHOUT TOPHUS, UNSPECIFIED CAUSE, UNSPECIFIED SITE: ICD-10-CM

## 2023-06-19 RX ORDER — APIXABAN 5 MG/1
TABLET, FILM COATED ORAL
Qty: 180 TABLET | Refills: 0 | Status: SHIPPED | OUTPATIENT
Start: 2023-06-19

## 2023-06-19 RX ORDER — ALLOPURINOL 100 MG/1
TABLET ORAL
Qty: 90 TABLET | Refills: 0 | Status: SHIPPED | OUTPATIENT
Start: 2023-06-19

## 2023-08-09 ENCOUNTER — OFFICE VISIT (OUTPATIENT)
Dept: FAMILY MEDICINE CLINIC | Facility: CLINIC | Age: 78
End: 2023-08-09
Payer: MEDICARE

## 2023-08-09 VITALS
TEMPERATURE: 98.2 F | BODY MASS INDEX: 36.53 KG/M2 | DIASTOLIC BLOOD PRESSURE: 68 MMHG | OXYGEN SATURATION: 92 % | HEIGHT: 61 IN | HEART RATE: 56 BPM | WEIGHT: 193.5 LBS | SYSTOLIC BLOOD PRESSURE: 120 MMHG

## 2023-08-09 DIAGNOSIS — R53.83 OTHER FATIGUE: Primary | ICD-10-CM

## 2023-08-09 DIAGNOSIS — I10 PRIMARY HYPERTENSION: ICD-10-CM

## 2023-08-09 DIAGNOSIS — E78.5 HYPERLIPIDEMIA, UNSPECIFIED HYPERLIPIDEMIA TYPE: ICD-10-CM

## 2023-08-09 DIAGNOSIS — N18.32 STAGE 3B CHRONIC KIDNEY DISEASE: ICD-10-CM

## 2023-08-09 NOTE — PROGRESS NOTES
"Subjective:  Pennie Gibson is a 78 y.o. female who presents for     SOA/Fatigue; States that breathing has been ok but has had increased fatigue, issues with her voice. Denied any fever, chills, leg swelling, weigh gain, orthopnea, worsening SALEH.    Hypertension; currently on metoprolol 100 mg twice daily.  States that blood pressure has been well controlled.  Does not have any issues with medications. Denies any cardiac symptoms, chest pain, shortness of breath, headaches, vision changes, numbness, tingling, weakness, leg swelling, orthopnea, dyspnea on exertion.    Patient Active Problem List   Diagnosis    Seasonal allergies    Acute UTI    Congestive heart failure    Nonrheumatic mitral valve regurgitation    Nonrheumatic tricuspid valve regurgitation    Sick sinus syndrome    Cardiac pacemaker in situ    Atrial fibrillation    Gout    Chronic kidney disease    Moderate persistent asthma without complication    Complications, mechanical, pacemaker, cardiac    Class 2 obesity due to excess calories without serious comorbidity with body mass index (BMI) of 38.0 to 38.9 in adult    Acute bronchitis due to COVID-19 virus    Dyspnea, unspecified type    COPD with acute exacerbation    Positive D dimer    Intravenous infiltration     Vitals:    Vitals:    08/09/23 1640   BP: 120/68   Pulse: 56   Temp: 98.2 øF (36.8 øC)   TempSrc: Oral   SpO2: 92%   Weight: 87.8 kg (193 lb 8 oz)   Height: 154.9 cm (61\")     Body mass index is 36.56 kg/mý.      Current Outpatient Medications:     albuterol sulfate  (90 Base) MCG/ACT inhaler, Inhale 2 puffs Every 4 (Four) Hours As Needed for Wheezing., Disp: 18 g, Rfl: 3    allopurinol (ZYLOPRIM) 100 MG tablet, Take 1 tablet by mouth once daily, Disp: 90 tablet, Rfl: 0    aspirin (aspirin) 81 MG EC tablet, Take 1 tablet by mouth Daily., Disp: 90 tablet, Rfl: 1    atorvastatin (LIPITOR) 20 MG tablet, Take 1 tablet by mouth once daily, Disp: 90 tablet, Rfl: 0    " budesonide-formoterol (Symbicort) 160-4.5 MCG/ACT inhaler, Inhale 2 puffs 2 (Two) Times a Day., Disp: 10.2 g, Rfl: 12    calcium carbonate (OS-MICHELL) 600 MG tablet, Take 1 tablet by mouth Daily., Disp: , Rfl:     Cranberry 1000 MG capsule, Take 4,200 mg by mouth Daily., Disp: , Rfl:     Cyanocobalamin (VITAMIN B 12 PO), Take 1,000 mcg by mouth Daily., Disp: , Rfl:     Eliquis 5 MG tablet tablet, TAKE 1 TABLET BY MOUTH EVERY 12 HOURS, Disp: 180 tablet, Rfl: 0    Garlic 1000 MG capsule, Take 1 capsule by mouth Daily., Disp: , Rfl:     lisinopril (PRINIVIL,ZESTRIL) 20 MG tablet, Take 1/2 (one-half) tablet by mouth once daily, Disp: 90 tablet, Rfl: 0    metoprolol tartrate (LOPRESSOR) 100 MG tablet, Take 1 tablet by mouth twice daily, Disp: 180 tablet, Rfl: 0    montelukast (SINGULAIR) 10 MG tablet, TAKE 1 TABLET BY MOUTH ONCE DAILY AT NIGHT, Disp: 90 tablet, Rfl: 0    Patient Active Problem List   Diagnosis    Seasonal allergies    Acute UTI    Congestive heart failure    Nonrheumatic mitral valve regurgitation    Nonrheumatic tricuspid valve regurgitation    Sick sinus syndrome    Cardiac pacemaker in situ    Atrial fibrillation    Gout    Chronic kidney disease    Moderate persistent asthma without complication    Complications, mechanical, pacemaker, cardiac    Class 2 obesity due to excess calories without serious comorbidity with body mass index (BMI) of 38.0 to 38.9 in adult    Acute bronchitis due to COVID-19 virus    Dyspnea, unspecified type    COPD with acute exacerbation    Positive D dimer    Intravenous infiltration     Past Surgical History:   Procedure Laterality Date    BLADDER SURGERY      CARDIAC ELECTROPHYSIOLOGY PROCEDURE N/A 5/26/2022    Procedure: PPM generator change - dual;  Surgeon: Shiloh Medrano MD;  Location: [facility] INVASIVE LOCATION;  Service: Cardiology;  Laterality: N/A;    CARDIAC SURGERY      COLON RESECTION      x 2 small bowel    HYSTERECTOMY      PACEMAKER IMPLANTATION       VAGINAL BIOPSY       Social History     Socioeconomic History    Marital status:    Tobacco Use    Smoking status: Former     Packs/day: 2.00     Years: 45.00     Pack years: 90.00     Types: Cigarettes     Start date: 3/12/1953     Quit date: 3/12/1993     Years since quittin.4     Passive exposure: Past    Smokeless tobacco: Never   Vaping Use    Vaping Use: Never used   Substance and Sexual Activity    Alcohol use: Not Currently    Drug use: Never    Sexual activity: Defer     Family History   Problem Relation Age of Onset    Cancer Mother     Diabetes Father     Diabetes Sister     Diabetes Brother      Ancillary Procedure on 2023   Component Date Value Ref Range Status    Target HR (85%) 2023 119  bpm Final    Max. Pred. HR (100%) 2023 140  bpm Final    EF(MOD-bp) 2023 59.4  % Final    LVIDd 2023 5.2  cm Final    LVIDs 2023 3.3  cm Final    IVSd 2023 0.84  cm Final    LVPWd 2023 0.90  cm Final    FS 2023 35.3  % Final    IVS/LVPW 2023 0.93  cm Final    ESV(cubed) 2023 37.5  ml Final    EDV(cubed) 2023 138.2  ml Final    LVOT area 2023 3.1  cm2 Final    LV mass(C)d 2023 159.7  grams Final    LVOT diam 2023 1.97  cm Final    EDV(MOD-sp2) 2023 72.3  ml Final    EDV(MOD-sp4) 2023 71.4  ml Final    ESV(MOD-sp2) 2023 27.7  ml Final    ESV(MOD-sp4) 2023 30.6  ml Final    SV(MOD-sp2) 2023 44.6  ml Final    SV(MOD-sp4) 2023 40.8  ml Final    EF(MOD-sp2) 2023 61.7  % Final    EF(MOD-sp4) 2023 57.1  % Final    MV E max josh 2023 113.0  cm/sec Final    MV A max josh 2023 69.2  cm/sec Final    MV dec time 2023 0.16  msec Final    MV E/A 2023 1.63   Final    Med Peak E' Josh 2023 6.9  cm/sec Final    Lat Peak E' Josh 2023 11.0  cm/sec Final    Avg E/e' ratio 2023 12.63   Final    SV(LVOT) 2023 66.3  ml Final    RV Base 2023 3.5   cm Final    RV Mid 05/23/2023 2.9  cm Final    TAPSE (>1.6) 05/23/2023 2.8  cm Final    LA dimension (2D)  05/23/2023 4.0  cm Final    LV V1 max 05/23/2023 94.8  cm/sec Final    LV V1 max PG 05/23/2023 3.6  mmHg Final    LV V1 mean PG 05/23/2023 2.00  mmHg Final    LV V1 VTI 05/23/2023 21.7  cm Final    Ao pk trent 05/23/2023 116.0  cm/sec Final    Ao max PG 05/23/2023 5.4  mmHg Final    Ao mean PG 05/23/2023 3.0  mmHg Final    Ao V2 VTI 05/23/2023 27.8  cm Final    VICTORIA(I,D) 05/23/2023 2.39  cm2 Final    AI P1/2t 05/23/2023 711.0  msec Final    MV max PG 05/23/2023 5.0  mmHg Final    MV mean PG 05/23/2023 1.68  mmHg Final    MV V2 VTI 05/23/2023 31.4  cm Final    MVA(VTI) 05/23/2023 2.12  cm2 Final    MR max trent 05/23/2023 577.5  cm/sec Final    MR max PG 05/23/2023 133.4  mmHg Final    TR max trent 05/23/2023 309.6  cm/sec Final    TR max PG 05/23/2023 38.3  mmHg Final    RVSP(TR) 05/23/2023 41.3  mmHg Final    RAP systole 05/23/2023 3.0  mmHg Final    RV V1 max PG 05/23/2023 2.22  mmHg Final    RV V1 max 05/23/2023 74.4  cm/sec Final    RV V1 VTI 05/23/2023 15.0  cm Final    PA V2 max 05/23/2023 86.3  cm/sec Final    Ao root diam 05/23/2023 2.8  cm Final    ACS 05/23/2023 1.87  cm Final    Sinus 05/23/2023 2.8  cm Final   Admission on 04/23/2023, Discharged on 04/25/2023   Component Date Value Ref Range Status    QT Interval 04/23/2023 442  ms Final    QTC Interval 04/23/2023 444  ms Final    Glucose 04/23/2023 186 (H)  65 - 99 mg/dL Final    BUN 04/23/2023 16  8 - 23 mg/dL Final    Creatinine 04/23/2023 1.36 (H)  0.57 - 1.00 mg/dL Final    Sodium 04/23/2023 139  136 - 145 mmol/L Final    Potassium 04/23/2023 4.8  3.5 - 5.2 mmol/L Final    Slight hemolysis detected by analyzer. Results may be affected.    Chloride 04/23/2023 108 (H)  98 - 107 mmol/L Final    CO2 04/23/2023 20.0 (L)  22.0 - 29.0 mmol/L Final    Calcium 04/23/2023 9.4  8.6 - 10.5 mg/dL Final    Total Protein 04/23/2023 6.0  6.0 - 8.5 g/dL Final     Albumin 04/23/2023 3.6  3.5 - 5.2 g/dL Final    ALT (SGPT) 04/23/2023 16  1 - 33 U/L Final    AST (SGOT) 04/23/2023 28  1 - 32 U/L Final    Slight hemolysis detected by analyzer. Results may be affected.    Alkaline Phosphatase 04/23/2023 85  39 - 117 U/L Final    Total Bilirubin 04/23/2023 0.3  0.0 - 1.2 mg/dL Final    Globulin 04/23/2023 2.4  gm/dL Final    A/G Ratio 04/23/2023 1.5  g/dL Final    BUN/Creatinine Ratio 04/23/2023 11.8  7.0 - 25.0 Final    Anion Gap 04/23/2023 11.0  5.0 - 15.0 mmol/L Final    eGFR 04/23/2023 39.5 (L)  >60.0 mL/min/1.73 Final    proBNP 04/23/2023 916.1  0.0 - 1,800.0 pg/mL Final    HS Troponin T 04/23/2023 10 (H)  <10 ng/L Final    Blood Culture 04/23/2023 No growth at 5 days   Final    Blood Culture 04/23/2023 No growth at 5 days   Final    Lactate 04/23/2023 2.5 (C)  0.5 - 2.0 mmol/L Final    Extra Tube 04/23/2023 Hold for add-ons.   Final    Auto resulted.    Extra Tube 04/23/2023 hold for add-on   Final    Auto resulted    Extra Tube 04/23/2023 Hold for add-ons.   Final    Auto resulted.    Extra Tube 04/23/2023 Hold for add-ons.   Final    Auto resulted    WBC 04/23/2023 10.20  3.40 - 10.80 10*3/mm3 Final    RBC 04/23/2023 4.55  3.77 - 5.28 10*6/mm3 Final    Hemoglobin 04/23/2023 13.4  12.0 - 15.9 g/dL Final    Hematocrit 04/23/2023 41.0  34.0 - 46.6 % Final    MCV 04/23/2023 90.1  79.0 - 97.0 fL Final    MCH 04/23/2023 29.5  26.6 - 33.0 pg Final    MCHC 04/23/2023 32.7  31.5 - 35.7 g/dL Final    RDW 04/23/2023 14.6  12.3 - 15.4 % Final    RDW-SD 04/23/2023 48.0  37.0 - 54.0 fl Final    MPV 04/23/2023 9.5  6.0 - 12.0 fL Final    Platelets 04/23/2023 215  140 - 450 10*3/mm3 Final    Neutrophil % 04/23/2023 70.5  42.7 - 76.0 % Final    Lymphocyte % 04/23/2023 23.1  19.6 - 45.3 % Final    Monocyte % 04/23/2023 4.9 (L)  5.0 - 12.0 % Final    Eosinophil % 04/23/2023 0.6  0.3 - 6.2 % Final    Basophil % 04/23/2023 0.6  0.0 - 1.5 % Final    Immature Grans % 04/23/2023 0.3  0.0 - 0.5 %  Final    Neutrophils, Absolute 04/23/2023 7.19 (H)  1.70 - 7.00 10*3/mm3 Final    Lymphocytes, Absolute 04/23/2023 2.36  0.70 - 3.10 10*3/mm3 Final    Monocytes, Absolute 04/23/2023 0.50  0.10 - 0.90 10*3/mm3 Final    Eosinophils, Absolute 04/23/2023 0.06  0.00 - 0.40 10*3/mm3 Final    Basophils, Absolute 04/23/2023 0.06  0.00 - 0.20 10*3/mm3 Final    Immature Grans, Absolute 04/23/2023 0.03  0.00 - 0.05 10*3/mm3 Final    nRBC 04/23/2023 0.0  0.0 - 0.2 /100 WBC Final    COVID19 04/23/2023 Not Detected  Not Detected - Ref. Range Final    Influenza A PCR 04/23/2023 Not Detected  Not Detected Final    Influenza B PCR 04/23/2023 Not Detected  Not Detected Final    Lactate 04/23/2023 2.2 (C)  0.5 - 2.0 mmol/L Final    D-Dimer, Quantitative 04/23/2023 >20,000 (H)  0 - 800 ng/mL (FEU) Final    Color, UA 04/25/2023 Yellow  Yellow, Straw, Dark Yellow, [place] Final    Appearance, UA 04/25/2023 Clear  Clear Final    pH, UA 04/25/2023 6.5  5.0 - 9.0 Final    Specific Gravity, UA 04/25/2023 1.013  1.003 - 1.030 Final    Glucose, UA 04/25/2023 Negative  Negative Final    Ketones, UA 04/25/2023 Negative  Negative Final    Bilirubin, UA 04/25/2023 Negative  Negative Final    Blood, UA 04/25/2023 Negative  Negative Final    Protein, UA 04/25/2023 Negative  Negative Final    Leuk Esterase, UA 04/25/2023 Moderate (2+) (A)  Negative Final    Nitrite, UA 04/25/2023 Negative  Negative Final    Urobilinogen, UA 04/25/2023 0.2 E.U./dL  0.2 - 1.0 E.U./dL Final    Lactate 04/23/2023 2.9 (C)  0.5 - 2.0 mmol/L Final    Glucose 04/24/2023 149 (H)  65 - 99 mg/dL Final    BUN 04/24/2023 15  8 - 23 mg/dL Final    Creatinine 04/24/2023 1.24 (H)  0.57 - 1.00 mg/dL Final    Sodium 04/24/2023 143  136 - 145 mmol/L Final    Potassium 04/24/2023 4.3  3.5 - 5.2 mmol/L Final    Chloride 04/24/2023 111 (H)  98 - 107 mmol/L Final    CO2 04/24/2023 22.0  22.0 - 29.0 mmol/L Final    Calcium 04/24/2023 9.2  8.6 - 10.5 mg/dL Final    BUN/Creatinine Ratio  04/24/2023 12.1  7.0 - 25.0 Final    Anion Gap 04/24/2023 10.0  5.0 - 15.0 mmol/L Final    eGFR 04/24/2023 44.1 (L)  >60.0 mL/min/1.73 Final    WBC 04/24/2023 11.81 (H)  3.40 - 10.80 10*3/mm3 Final    RBC 04/24/2023 3.79  3.77 - 5.28 10*6/mm3 Final    Hemoglobin 04/24/2023 11.2 (L)  12.0 - 15.9 g/dL Final    Hematocrit 04/24/2023 34.1  34.0 - 46.6 % Final    MCV 04/24/2023 90.0  79.0 - 97.0 fL Final    MCH 04/24/2023 29.6  26.6 - 33.0 pg Final    MCHC 04/24/2023 32.8  31.5 - 35.7 g/dL Final    RDW 04/24/2023 14.7  12.3 - 15.4 % Final    RDW-SD 04/24/2023 48.5  37.0 - 54.0 fl Final    MPV 04/24/2023 9.6  6.0 - 12.0 fL Final    Platelets 04/24/2023 240  140 - 450 10*3/mm3 Final    Neutrophil % 04/24/2023 56.6  42.7 - 76.0 % Final    Lymphocyte % 04/24/2023 35.0  19.6 - 45.3 % Final    Monocyte % 04/24/2023 6.4  5.0 - 12.0 % Final    Eosinophil % 04/24/2023 0.8  0.3 - 6.2 % Final    Basophil % 04/24/2023 0.9  0.0 - 1.5 % Final    Immature Grans % 04/24/2023 0.3  0.0 - 0.5 % Final    Neutrophils, Absolute 04/24/2023 6.70  1.70 - 7.00 10*3/mm3 Final    Lymphocytes, Absolute 04/24/2023 4.13 (H)  0.70 - 3.10 10*3/mm3 Final    Monocytes, Absolute 04/24/2023 0.75  0.10 - 0.90 10*3/mm3 Final    Eosinophils, Absolute 04/24/2023 0.09  0.00 - 0.40 10*3/mm3 Final    Basophils, Absolute 04/24/2023 0.11  0.00 - 0.20 10*3/mm3 Final    Immature Grans, Absolute 04/24/2023 0.03  0.00 - 0.05 10*3/mm3 Final    nRBC 04/24/2023 0.0  0.0 - 0.2 /100 WBC Final    Lactate 04/24/2023 1.7  0.5 - 2.0 mmol/L Final    Lactate 04/24/2023 1.9  0.5 - 2.0 mmol/L Final    Procalcitonin 04/24/2023 0.06  0.00 - 0.25 ng/mL Final    Glucose 04/25/2023 146 (H)  65 - 99 mg/dL Final    BUN 04/25/2023 16  8 - 23 mg/dL Final    Creatinine 04/25/2023 1.39 (H)  0.57 - 1.00 mg/dL Final    Sodium 04/25/2023 140  136 - 145 mmol/L Final    Potassium 04/25/2023 5.3 (H)  3.5 - 5.2 mmol/L Final    Chloride 04/25/2023 109 (H)  98 - 107 mmol/L Final    CO2 04/25/2023 20.0  (L)  22.0 - 29.0 mmol/L Final    Calcium 04/25/2023 10.1  8.6 - 10.5 mg/dL Final    BUN/Creatinine Ratio 04/25/2023 11.5  7.0 - 25.0 Final    Anion Gap 04/25/2023 11.0  5.0 - 15.0 mmol/L Final    eGFR 04/25/2023 38.4 (L)  >60.0 mL/min/1.73 Final    Lactate 04/25/2023 1.9  0.5 - 2.0 mmol/L Final    Extra Tube 04/25/2023 hold for add-on   Final    Auto resulted    RBC, UA 04/25/2023 0-2 (A)  None Seen /HPF Final    WBC, UA 04/25/2023 6-12 (A)  None Seen, 0-2, 3-5 /HPF Final    Bacteria, UA 04/25/2023 None Seen  None Seen /HPF Final    Squamous Epithelial Cells, UA 04/25/2023 3-5 (A)  None Seen, 0-2 /HPF Final    Hyaline Casts, UA 04/25/2023 None Seen  None Seen /LPF Final    Methodology 04/25/2023 Automated Microscopy   Final   Office Visit on 02/23/2023   Component Date Value Ref Range Status    SARS Antigen 02/23/2023 Detected (A)  Not Detected, Presumptive Negative Final    Influenza A Antigen RAUL 02/23/2023 Not Detected  Not Detected Final    Influenza B Antigen RAUL 02/23/2023 Not Detected  Not Detected Final    Internal Control 02/23/2023 Passed  Passed Final    Lot Number 02/23/2023 [id number]   Final    Expiration Date 02/23/2023 03/11/2023   Final      Adult Transthoracic Echo Complete W/ Cont if Necessary Per Protocol    Left ventricular ejection fraction appears to be 56 - 60%.    Left ventricular diastolic function is consistent with (grade I)   impaired relaxation.    Left atrial volume is moderately increased.    Estimated right ventricular systolic pressure from tricuspid   regurgitation is mildly elevated (35-45 mmHg).    Mild pulmonary hypertension is present.    The aortic valve was poorly visualized but appears trileaflet. Mild   aortic valve regurgitation is present. Mitral Valve: The mitral valve is   grossly normal in structure. Mild mitral valve regurgitation is present.      [unfilled]  Immunization History   Administered Date(s) Administered    COVID-19 (MODERNA) 1st,2nd,3rd Dose Monovalent  09/25/2021, 10/23/2021    Fluzone High-Dose 65+yrs 12/20/2021    Influenza, Unspecified 01/10/2018    Pneumococcal Conjugate 20-Valent (PCV20) 02/16/2023    Pneumococcal Polysaccharide (PPSV23) 02/07/2022    Pneumococcal, Unspecified 01/01/2013    Tdap 12/20/2021     The following portions of the patient's history were reviewed and updated as appropriate: allergies, current medications, past family history, past medical history, past social history, past surgical history and problem list.    PHQ-9 Total Score:             Physical Exam  Constitutional:       Appearance: Normal appearance.   HENT:      Head: Normocephalic and atraumatic.      Right Ear: External ear normal.      Left Ear: External ear normal.   Eyes:      General:         Right eye: No discharge.         Left eye: No discharge.      Conjunctiva/sclera: Conjunctivae normal.   Cardiovascular:      Rate and Rhythm: Normal rate and regular rhythm.      Pulses: Normal pulses.      Heart sounds: Normal heart sounds. No murmur heard.  Pulmonary:      Effort: Pulmonary effort is normal. No respiratory distress.      Breath sounds: Normal breath sounds.   Abdominal:      General: There is no distension.      Palpations: Abdomen is soft.      Tenderness: There is no abdominal tenderness.   Musculoskeletal:      Cervical back: Normal range of motion.      Right lower leg: No edema.      Left lower leg: No edema.   Lymphadenopathy:      Cervical: No cervical adenopathy.   Neurological:      Mental Status: She is alert. Mental status is at baseline.   Psychiatric:         Mood and Affect: Mood normal.         Behavior: Behavior normal.       Assessment & Plan    Diagnosis Plan   1. Other fatigue  TSH Rfx On Abnormal To Free T4      2. Stage 3b chronic kidney disease  Comprehensive Metabolic Panel      3. Hyperlipidemia, unspecified hyperlipidemia type  Lipid Panel      4. Primary hypertension  CBC & Differential    Comprehensive Metabolic Panel         Orders  Placed This Encounter   Procedures    Comprehensive Metabolic Panel    TSH Rfx On Abnormal To Free T4    Lipid Panel    CBC Auto Differential    CBC & Differential     Order Specific Question:   Manual Differential     Answer:   No     Hypertension; does not have issues with current medications, blood pressure has been well controlled.  Discussed importance of blood pressure control, risk of uncontrolled hypertension, continue to work on diet, exercise, weight loss.  After discussion, will continue current management, obtain labs as above.  Follow-up in 3 months with blood pressure log or sooner if needed.  Given strict ER/cardiac precautions.    Fatigue; patient with benign work-up, no cardiac or pulmonary symptoms.  Suspect deconditioning, counseled patient and daughter, patient to increase exercise, activity.  Given strict ER/return precautions        This document has been electronically signed by Isaac Love MD on August 23, 2023 23:34 CDT    EMR Dragon/Transciption Disclaimer: Some of this note may be an electronic transcription/translation of spoken language to printed text.  The electronic translation of spoken language may permit erroneous, or at times, nonsensical words or phrases to be inadvertently transcribed. Although I have reviewed the note for such errors, some may still exist.

## 2023-08-11 ENCOUNTER — LAB (OUTPATIENT)
Dept: LAB | Facility: HOSPITAL | Age: 78
End: 2023-08-11
Payer: MEDICARE

## 2023-08-11 PROCEDURE — 80061 LIPID PANEL: CPT | Performed by: STUDENT IN AN ORGANIZED HEALTH CARE EDUCATION/TRAINING PROGRAM

## 2023-08-11 PROCEDURE — 80053 COMPREHEN METABOLIC PANEL: CPT | Performed by: STUDENT IN AN ORGANIZED HEALTH CARE EDUCATION/TRAINING PROGRAM

## 2023-08-11 PROCEDURE — 36415 COLL VENOUS BLD VENIPUNCTURE: CPT | Performed by: STUDENT IN AN ORGANIZED HEALTH CARE EDUCATION/TRAINING PROGRAM

## 2023-08-11 PROCEDURE — 84443 ASSAY THYROID STIM HORMONE: CPT | Performed by: STUDENT IN AN ORGANIZED HEALTH CARE EDUCATION/TRAINING PROGRAM

## 2023-08-11 PROCEDURE — 85025 COMPLETE CBC W/AUTO DIFF WBC: CPT | Performed by: STUDENT IN AN ORGANIZED HEALTH CARE EDUCATION/TRAINING PROGRAM

## 2023-08-12 LAB
ALBUMIN SERPL-MCNC: 4 G/DL (ref 3.5–5.2)
ALBUMIN/GLOB SERPL: 1.8 G/DL
ALP SERPL-CCNC: 79 U/L (ref 39–117)
ALT SERPL W P-5'-P-CCNC: 15 U/L (ref 1–33)
ANION GAP SERPL CALCULATED.3IONS-SCNC: 12 MMOL/L (ref 5–15)
AST SERPL-CCNC: 24 U/L (ref 1–32)
BASOPHILS # BLD AUTO: 0.09 10*3/MM3 (ref 0–0.2)
BASOPHILS NFR BLD AUTO: 0.8 % (ref 0–1.5)
BILIRUB SERPL-MCNC: 0.4 MG/DL (ref 0–1.2)
BUN SERPL-MCNC: 21 MG/DL (ref 8–23)
BUN/CREAT SERPL: 14.8 (ref 7–25)
CALCIUM SPEC-SCNC: 10.3 MG/DL (ref 8.6–10.5)
CHLORIDE SERPL-SCNC: 108 MMOL/L (ref 98–107)
CHOLEST SERPL-MCNC: 93 MG/DL (ref 0–200)
CO2 SERPL-SCNC: 22 MMOL/L (ref 22–29)
CREAT SERPL-MCNC: 1.42 MG/DL (ref 0.57–1)
DEPRECATED RDW RBC AUTO: 41.7 FL (ref 37–54)
EGFRCR SERPLBLD CKD-EPI 2021: 37.5 ML/MIN/1.73
EOSINOPHIL # BLD AUTO: 0.46 10*3/MM3 (ref 0–0.4)
EOSINOPHIL NFR BLD AUTO: 4.3 % (ref 0.3–6.2)
ERYTHROCYTE [DISTWIDTH] IN BLOOD BY AUTOMATED COUNT: 13.6 % (ref 12.3–15.4)
GLOBULIN UR ELPH-MCNC: 2.2 GM/DL
GLUCOSE SERPL-MCNC: 90 MG/DL (ref 65–99)
HCT VFR BLD AUTO: 38.4 % (ref 34–46.6)
HDLC SERPL-MCNC: 36 MG/DL (ref 40–60)
HGB BLD-MCNC: 13.4 G/DL (ref 12–15.9)
IMM GRANULOCYTES # BLD AUTO: 0.04 10*3/MM3 (ref 0–0.05)
IMM GRANULOCYTES NFR BLD AUTO: 0.4 % (ref 0–0.5)
LDLC SERPL CALC-MCNC: 34 MG/DL (ref 0–100)
LDLC/HDLC SERPL: 0.84 {RATIO}
LYMPHOCYTES # BLD AUTO: 4.96 10*3/MM3 (ref 0.7–3.1)
LYMPHOCYTES NFR BLD AUTO: 46.6 % (ref 19.6–45.3)
MCH RBC QN AUTO: 30 PG (ref 26.6–33)
MCHC RBC AUTO-ENTMCNC: 34.9 G/DL (ref 31.5–35.7)
MCV RBC AUTO: 85.9 FL (ref 79–97)
MONOCYTES # BLD AUTO: 0.72 10*3/MM3 (ref 0.1–0.9)
MONOCYTES NFR BLD AUTO: 6.8 % (ref 5–12)
NEUTROPHILS NFR BLD AUTO: 4.37 10*3/MM3 (ref 1.7–7)
NEUTROPHILS NFR BLD AUTO: 41.1 % (ref 42.7–76)
NRBC BLD AUTO-RTO: 0.2 /100 WBC (ref 0–0.2)
PLATELET # BLD AUTO: 270 10*3/MM3 (ref 140–450)
PMV BLD AUTO: 10.4 FL (ref 6–12)
POTASSIUM SERPL-SCNC: 4.2 MMOL/L (ref 3.5–5.2)
PROT SERPL-MCNC: 6.2 G/DL (ref 6–8.5)
RBC # BLD AUTO: 4.47 10*6/MM3 (ref 3.77–5.28)
SODIUM SERPL-SCNC: 142 MMOL/L (ref 136–145)
TRIGL SERPL-MCNC: 133 MG/DL (ref 0–150)
TSH SERPL DL<=0.05 MIU/L-ACNC: 1.72 UIU/ML (ref 0.27–4.2)
VLDLC SERPL-MCNC: 23 MG/DL (ref 5–40)
WBC NRBC COR # BLD: 10.64 10*3/MM3 (ref 3.4–10.8)

## 2023-09-05 RX ORDER — METOPROLOL TARTRATE 100 MG/1
TABLET ORAL
Qty: 180 TABLET | Refills: 0 | Status: SHIPPED | OUTPATIENT
Start: 2023-09-05

## 2023-09-05 RX ORDER — ATORVASTATIN CALCIUM 20 MG/1
TABLET, FILM COATED ORAL
Qty: 90 TABLET | Refills: 0 | Status: SHIPPED | OUTPATIENT
Start: 2023-09-05

## 2023-09-06 ENCOUNTER — OFFICE VISIT (OUTPATIENT)
Dept: FAMILY MEDICINE CLINIC | Facility: CLINIC | Age: 78
End: 2023-09-06
Payer: MEDICARE

## 2023-09-06 VITALS
DIASTOLIC BLOOD PRESSURE: 88 MMHG | HEIGHT: 61 IN | HEART RATE: 60 BPM | OXYGEN SATURATION: 97 % | TEMPERATURE: 97.3 F | SYSTOLIC BLOOD PRESSURE: 148 MMHG | BODY MASS INDEX: 36.25 KG/M2 | WEIGHT: 192 LBS

## 2023-09-06 DIAGNOSIS — J45.41 MODERATE PERSISTENT ASTHMA WITH EXACERBATION: Primary | Chronic | ICD-10-CM

## 2023-09-06 DIAGNOSIS — R06.02 SHORTNESS OF BREATH: ICD-10-CM

## 2023-09-06 PROCEDURE — 1159F MED LIST DOCD IN RCRD: CPT | Performed by: NURSE PRACTITIONER

## 2023-09-06 PROCEDURE — 96372 THER/PROPH/DIAG INJ SC/IM: CPT | Performed by: NURSE PRACTITIONER

## 2023-09-06 PROCEDURE — 1160F RVW MEDS BY RX/DR IN RCRD: CPT | Performed by: NURSE PRACTITIONER

## 2023-09-06 PROCEDURE — 99214 OFFICE O/P EST MOD 30 MIN: CPT | Performed by: NURSE PRACTITIONER

## 2023-09-06 RX ORDER — PREDNISONE 20 MG/1
20 TABLET ORAL DAILY
Qty: 5 TABLET | Refills: 0 | Status: SHIPPED | OUTPATIENT
Start: 2023-09-06

## 2023-09-06 RX ORDER — METHYLPREDNISOLONE SODIUM SUCCINATE 40 MG/ML
40 INJECTION, POWDER, LYOPHILIZED, FOR SOLUTION INTRAMUSCULAR; INTRAVENOUS ONCE
Status: COMPLETED | OUTPATIENT
Start: 2023-09-06 | End: 2023-09-06

## 2023-09-06 RX ADMIN — METHYLPREDNISOLONE SODIUM SUCCINATE 40 MG: 40 INJECTION, POWDER, LYOPHILIZED, FOR SOLUTION INTRAMUSCULAR; INTRAVENOUS at 17:43

## 2023-09-06 NOTE — PROGRESS NOTES
"Chief Complaint  Illness (Asthma, SOB, cough, ribs sore, mid back hurts X 1 week.)    Subjective          Pennie Gibson presents to [facility]    History of Present Illness  [facility]    PCP: Dr. Love    CC: \"asthma, shortness of breath, cough, ribs sore, mid back hurts\"    Hx of asthma with recent exacerbation of symptoms a week ago.  Increased dyspnea, cough.  Albuterol helps, but having to use more often.  No fever, body aches, chills reported.  Ribs/back hurting.  Reports she may have forgotten to use her Symbicort a few days.  Taking Singulair daily.  She is followed by [facility] pulmonology, next appt scheduled for November.  Denies exposures to covid/flu, declines testing today. Moved from [place] in 2021 to live with daughter.  Thinks humidity might play a role in more frequent exacerbations.  Hospitalized in May for exacerbation. Has air purifier and dehumidifier at home. Lives with daughter who is with her at today's visit. Hx of afib/pacemaker, on chronic asa and eliquis. Has nebulizer at home, doesn't like to use, feels like it makes symptoms worse for her.           Review of Systems   HENT: Negative.     Eyes: Negative.    Respiratory:  Positive for cough (sry), chest tightness, shortness of breath and wheezing.    Cardiovascular: Negative.    Gastrointestinal:  Positive for abdominal pain (no change from normal). Negative for nausea and vomiting.   Genitourinary:  Negative for dysuria, frequency, hematuria and vaginal discharge.   Skin: Negative.    Neurological:  Positive for weakness (generalized). Negative for dizziness and headaches.      Objective   Vital Signs:   /88 (BP Location: Right arm, Patient Position: Sitting, Cuff Size: Large Adult)   Pulse 60   Temp 97.3 °F (36.3 °C) (Oral)   Ht 154.9 cm (61\")   Wt 87.1 kg (192 lb)   SpO2 97%   BMI 36.28 kg/m²       Physical Exam  Vitals and nursing note reviewed. "   Constitutional:       General: She is not in acute distress.     Appearance: She is not ill-appearing.   HENT:      Head: Normocephalic and atraumatic.      Right Ear: Tympanic membrane and ear canal normal.      Left Ear: Tympanic membrane and ear canal normal.      Nose: Nose normal. No congestion.      Mouth/Throat:      Mouth: Mucous membranes are moist.      Pharynx: No oropharyngeal exudate or posterior oropharyngeal erythema.   Eyes:      General:         Right eye: No discharge.         Left eye: No discharge.      Conjunctiva/sclera: Conjunctivae normal.   Cardiovascular:      Rate and Rhythm: Normal rate and regular rhythm.   Pulmonary:      Effort: No respiratory distress.      Breath sounds: Wheezing present. No rhonchi or rales.      Comments: Decreased aeration, labored breathing with activity noted  Musculoskeletal:      Cervical back: Neck supple. No tenderness.   Lymphadenopathy:      Cervical: No cervical adenopathy.   Skin:     General: Skin is warm and dry.   Neurological:      General: No focal deficit present.      Mental Status: She is alert and oriented to person, place, and time.   Psychiatric:         Mood and Affect: Mood normal.         Thought Content: Thought content normal.        Result Review :     Common labs          4/24/2023    01:59 4/25/2023    08:20 8/11/2023    08:41   Common Labs   Glucose 149  146  90    BUN 15  16  21    Creatinine 1.24  1.39  1.42    Sodium 143  140  142    Potassium 4.3  5.3  4.2    Chloride 111  109  108    Calcium 9.2  10.1  10.3    Albumin   4.0    Total Bilirubin   0.4    Alkaline Phosphatase   79    AST (SGOT)   24    ALT (SGPT)   15    WBC 11.81   10.64    Hemoglobin 11.2   13.4    Hematocrit 34.1   38.4    Platelets 240   270    Total Cholesterol   93    Triglycerides   133    HDL Cholesterol   36    LDL Cholesterol    34      TSH          1/16/2023    16:54 8/11/2023    08:41   TSH   TSH 1.050  1.720      Data reviewed : Consultant notes  cardiology and pulmonology         XR Chest PA & Lateral    Result Date: 9/6/2023  No acute abnormality.     Assessment and Plan    Diagnoses and all orders for this visit:    1. Moderate persistent asthma with exacerbation (Primary)  -     methylPREDNISolone sodium succinate (SOLU-Medrol) injection 40 mg  -     predniSONE (DELTASONE) 20 MG tablet; Take 1 tablet by mouth Daily. Begin tomorrow  Dispense: 5 tablet; Refill: 0    2. Shortness of breath  -     XR Chest PA & Lateral    Solumedrol 40 mg IM x 1 in office  Rx for Prednisone to begin tomorrow  Continue with Albuterol PRN  Continue with Singulair, Symbicort as prescribed--avoid skipping doses    See PCP or RTC if symptoms persist/worsen--worsening dyspnea, fever, purulent sputum  See PCP for routine f/u visit and management of chronic medical conditions    This document has been electronically signed by DEN Pressley on September 6, 2023 18:20 CDT,.    I spent 30 minutes caring for Pennie on this date of service. This time includes time spent by me in the following activities:preparing for the visit, reviewing tests, performing a medically appropriate examination and/or evaluation , counseling and educating the patient/family/caregiver, ordering medications, tests, or procedures, and documenting information in the medical record

## 2023-09-07 NOTE — PROGRESS NOTES
"    Pulmonary Consultation    Isaac Love MD,    Thank you for asking me to see Pennie Gibson for   Chief Complaint   Patient presents with   • COPD   • Asthma   .      History of Present Illness  Pennie Gibson is a 77 y.o. female     Patient referred from PCM for \"COPD\". No notes in the chart with further elaboration on this    Patient is here with her daughter. They say she was diagnosed with asthma years ago. She has a rescue inhaler but that's all, and doesn't use it because she doesn't like depending on it. Not clear if it really helps or not. Doesn't recall being on any controller meds. Feells like her breathing has gotten a little worse lately but not really sure over what time frame.    She moved from [place] about a year ago. Does notice her breathing is worse in the humidity    1 dog and 3 birds. Birds are new in the last year. Cockatiel, canary and parakeet        Tobacco use history:  Type: cigarettes  Amount: 1 ppd  Duration: 45 years  Cessation: 35 years ago (age 53)         Review of Systems: History obtained from chart review and the patient.  Review of Systems  As described in the HPI. Otherwise, remainder of ROS (14 systems) were negative.    Patient Active Problem List   Diagnosis   • Seasonal allergies   • Acute UTI   • Congestive heart failure (HCC)   • Nonrheumatic mitral valve regurgitation   • Nonrheumatic tricuspid valve regurgitation   • Sick sinus syndrome (HCC)   • Cardiac pacemaker in situ   • Atrial fibrillation (HCC)   • Gout   • Chronic kidney disease   • Moderate persistent asthma without complication         Current Outpatient Medications:   •  albuterol sulfate  (90 Base) MCG/ACT inhaler, Inhale 2 puffs Every 4 (Four) Hours As Needed for Wheezing., Disp: 18 g, Rfl: 6  •  allopurinol (ZYLOPRIM) 100 MG tablet, Take 100 mg by mouth Daily., Disp: , Rfl:   •  aspirin (aspirin) 81 MG EC tablet, Take 1 tablet by mouth Daily., Disp: 90 tablet, Rfl: 1  •  atorvastatin " (LIPITOR) 20 MG tablet, Take 1 tablet by mouth once daily, Disp: 90 tablet, Rfl: 3  •  calcium carbonate (OS-MICHELL) 600 MG tablet, Take 600 mg by mouth Daily., Disp: , Rfl:   •  Cranberry 1000 MG capsule, Take 4,200 mg by mouth Daily., Disp: , Rfl:   •  Eliquis 5 MG tablet tablet, TAKE 1 TABLET BY MOUTH EVERY 12 HOURS, Disp: 180 tablet, Rfl: 1  •  Garlic 1000 MG capsule, Take 1,000 mg by mouth Daily., Disp: , Rfl:   •  lisinopril (PRINIVIL,ZESTRIL) 20 MG tablet, Take 10 mg by mouth Daily., Disp: , Rfl:   •  metoprolol tartrate (LOPRESSOR) 100 MG tablet, Take 1 tablet by mouth twice daily, Disp: 180 tablet, Rfl: 0  •  SV Vitamin B-12 ER 1000 MCG tablet controlled-release, Take 1 tablet by mouth once daily, Disp: 90 each, Rfl: 1  •  vitamin C (ASCORBIC ACID) 250 MG tablet, Take 250 mg by mouth Daily., Disp: , Rfl:   •  budesonide-formoterol (SYMBICORT) 80-4.5 MCG/ACT inhaler, Inhale 2 puffs 2 (Two) Times a Day., Disp: 1 each, Rfl: 11    Allergies   Allergen Reactions   • Penicillins Rash       Past Medical History:   Diagnosis Date   • Asthma    • CHF (congestive heart failure) ([facility])    • COPD (chronic obstructive pulmonary disease) (HCC)    • High blood pressure    • Pacemaker      Past Surgical History:   Procedure Laterality Date   • BLADDER SURGERY     • CARDIAC SURGERY     • COLON RESECTION     • PACEMAKER IMPLANTATION     • VAGINAL BIOPSY       Social History     Socioeconomic History   • Marital status:    Tobacco Use   • Smoking status: Former Smoker     Packs/day: 2.00     Years: 45.00     Pack years: 90.00   • Smokeless tobacco: Never Used   Vaping Use   • Vaping Use: Never used   Substance and Sexual Activity   • Alcohol use: Not Currently     Comment: quit around her mid 50s   • Drug use: Never   • Sexual activity: Defer     Family History   Problem Relation Age of Onset   • Cancer Mother    • Diabetes Father    • Diabetes Sister    • Diabetes Brother           Objective     Blood pressure 108/60,  "pulse 99, height 152.4 cm (60\"), weight 81.6 kg (180 lb), SpO2 93 %.  Physical Exam  Vitals reviewed.   Constitutional:       Appearance: Normal appearance.   HENT:      Head: Normocephalic and atraumatic.      Nose: Nose normal.      Mouth/Throat:      Mouth: Mucous membranes are moist.      Pharynx: Oropharynx is clear.   Eyes:      Conjunctiva/sclera: Conjunctivae normal.      Pupils: Pupils are equal, round, and reactive to light.   Cardiovascular:      Rate and Rhythm: Normal rate and regular rhythm.      Pulses: Normal pulses.      Heart sounds: Normal heart sounds.   Pulmonary:      Effort: Pulmonary effort is normal.      Breath sounds: Normal breath sounds.   Abdominal:      General: Abdomen is flat. Bowel sounds are normal.      Palpations: Abdomen is soft.   Musculoskeletal:         General: Normal range of motion.      Cervical back: Normal range of motion.   Skin:     General: Skin is warm and dry.   Neurological:      General: No focal deficit present.      Mental Status: She is alert and oriented to person, place, and time.   Psychiatric:         Mood and Affect: Mood normal.         Behavior: Behavior normal.         PFTs: (independently reviewed and interpreted by me)  5/13/22  FVC 2.02L, 90%  FEV1 1.44L, 84%  Ratio 71%  TLC 4.56L, 102%  %  DLCO 64%  Normal spriometry, mildly elevated lung volume and evidence of air trapping, mild diffusion impairment    Radiology (independently reviewed and interpreted by me):   CXR 3/9/22- clear lung fields       Assessment & Plan     Diagnoses and all orders for this visit:    1. Shortness of breath (Primary)  -     Full Pulmonary Function Test With Bronchodilator    2. Moderate persistent asthma without complication    Other orders  -     budesonide-formoterol (SYMBICORT) 80-4.5 MCG/ACT inhaler; Inhale 2 puffs 2 (Two) Times a Day.  Dispense: 1 each; Refill: 11         Discussion/ Recommendations:   Patinet with history of asthma, PFTs today support that " diagnosis. Will try her on low dose Symbicort and see if she notices any improvement. Will likely take a month or two. I also think a HEPA filter would help given the birds in the house.     Can follow up in 3 months or sooner if needed             No follow-ups on file.      Thank you for allowing me to participate in the care of Pennie Gibson. Please do not hesitate to contact me with any questions.         This document has been electronically signed by iGselle Corrigan DO on May 13, 2022 14:30 CDT     Heterosexual

## 2023-09-20 ENCOUNTER — HOSPITAL ENCOUNTER (EMERGENCY)
Facility: HOSPITAL | Age: 78
Discharge: HOME | End: 2023-09-20
Attending: EMERGENCY MEDICINE | Admitting: EMERGENCY MEDICINE
Payer: MEDICARE

## 2023-09-20 ENCOUNTER — APPOINTMENT (OUTPATIENT)
Dept: CT IMAGING | Facility: HOSPITAL | Age: 78
End: 2023-09-20
Payer: MEDICARE

## 2023-09-20 ENCOUNTER — APPOINTMENT (OUTPATIENT)
Dept: GENERAL RADIOLOGY | Facility: HOSPITAL | Age: 78
End: 2023-09-20
Payer: MEDICARE

## 2023-09-20 VITALS
DIASTOLIC BLOOD PRESSURE: 59 MMHG | HEIGHT: 60 IN | WEIGHT: 192 LBS | BODY MASS INDEX: 37.69 KG/M2 | SYSTOLIC BLOOD PRESSURE: 114 MMHG | HEART RATE: 60 BPM | OXYGEN SATURATION: 98 % | TEMPERATURE: 98.3 F | RESPIRATION RATE: 20 BRPM

## 2023-09-20 DIAGNOSIS — R06.02 SHORTNESS OF BREATH: Primary | ICD-10-CM

## 2023-09-20 DIAGNOSIS — N18.9 CHRONIC KIDNEY DISEASE, UNSPECIFIED CKD STAGE: ICD-10-CM

## 2023-09-20 DIAGNOSIS — M54.6 ACUTE BILATERAL THORACIC BACK PAIN: ICD-10-CM

## 2023-09-20 LAB
ALBUMIN SERPL-MCNC: 3.7 G/DL (ref 3.5–5.2)
ALBUMIN/GLOB SERPL: 1.4 G/DL
ALP SERPL-CCNC: 77 U/L (ref 39–117)
ALT SERPL W P-5'-P-CCNC: 16 U/L (ref 1–33)
ANION GAP SERPL CALCULATED.3IONS-SCNC: 9 MMOL/L (ref 5–15)
AST SERPL-CCNC: 26 U/L (ref 1–32)
BASOPHILS # BLD AUTO: 0.1 10*3/MM3 (ref 0–0.2)
BASOPHILS NFR BLD AUTO: 0.6 % (ref 0–1.5)
BILIRUB SERPL-MCNC: 0.4 MG/DL (ref 0–1.2)
BUN SERPL-MCNC: 23 MG/DL (ref 8–23)
BUN/CREAT SERPL: 13 (ref 7–25)
CALCIUM SPEC-SCNC: 9.4 MG/DL (ref 8.6–10.5)
CHLORIDE SERPL-SCNC: 104 MMOL/L (ref 98–107)
CO2 SERPL-SCNC: 26 MMOL/L (ref 22–29)
CREAT SERPL-MCNC: 1.77 MG/DL (ref 0.57–1)
DEPRECATED RDW RBC AUTO: 44.5 FL (ref 37–54)
EGFRCR SERPLBLD CKD-EPI 2021: 28.8 ML/MIN/1.73
EOSINOPHIL # BLD AUTO: 0.28 10*3/MM3 (ref 0–0.4)
EOSINOPHIL NFR BLD AUTO: 1.8 % (ref 0.3–6.2)
ERYTHROCYTE [DISTWIDTH] IN BLOOD BY AUTOMATED COUNT: 13.8 % (ref 12.3–15.4)
GLOBULIN UR ELPH-MCNC: 2.6 GM/DL
GLUCOSE SERPL-MCNC: 99 MG/DL (ref 65–99)
HCT VFR BLD AUTO: 39.9 % (ref 34–46.6)
HGB BLD-MCNC: 12.9 G/DL (ref 12–15.9)
HOLD SPECIMEN: NORMAL
HOLD SPECIMEN: NORMAL
IMM GRANULOCYTES # BLD AUTO: 0.06 10*3/MM3 (ref 0–0.05)
IMM GRANULOCYTES NFR BLD AUTO: 0.4 % (ref 0–0.5)
LYMPHOCYTES # BLD AUTO: 5.89 10*3/MM3 (ref 0.7–3.1)
LYMPHOCYTES NFR BLD AUTO: 37.3 % (ref 19.6–45.3)
MCH RBC QN AUTO: 28.9 PG (ref 26.6–33)
MCHC RBC AUTO-ENTMCNC: 32.3 G/DL (ref 31.5–35.7)
MCV RBC AUTO: 89.3 FL (ref 79–97)
MONOCYTES # BLD AUTO: 1.09 10*3/MM3 (ref 0.1–0.9)
MONOCYTES NFR BLD AUTO: 6.9 % (ref 5–12)
NEUTROPHILS NFR BLD AUTO: 53 % (ref 42.7–76)
NEUTROPHILS NFR BLD AUTO: 8.37 10*3/MM3 (ref 1.7–7)
NRBC BLD AUTO-RTO: 0 /100 WBC (ref 0–0.2)
NT-PROBNP SERPL-MCNC: 477.2 PG/ML (ref 0–1800)
PLATELET # BLD AUTO: 253 10*3/MM3 (ref 140–450)
PMV BLD AUTO: 9.6 FL (ref 6–12)
POTASSIUM SERPL-SCNC: 5.4 MMOL/L (ref 3.5–5.2)
PROT SERPL-MCNC: 6.3 G/DL (ref 6–8.5)
QT INTERVAL: 422 MS
QTC INTERVAL: 422 MS
RBC # BLD AUTO: 4.47 10*6/MM3 (ref 3.77–5.28)
RBC MORPH BLD: NORMAL
SMALL PLATELETS BLD QL SMEAR: ADEQUATE
SODIUM SERPL-SCNC: 139 MMOL/L (ref 136–145)
TROPONIN T SERPL HS-MCNC: 12 NG/L
TROPONIN T SERPL HS-MCNC: 14 NG/L
WBC MORPH BLD: NORMAL
WBC NRBC COR # BLD: 15.79 10*3/MM3 (ref 3.4–10.8)
WHOLE BLOOD HOLD COAG: NORMAL
WHOLE BLOOD HOLD SPECIMEN: NORMAL

## 2023-09-20 PROCEDURE — 80053 COMPREHEN METABOLIC PANEL: CPT | Performed by: EMERGENCY MEDICINE

## 2023-09-20 PROCEDURE — 99284 EMERGENCY DEPT VISIT MOD MDM: CPT

## 2023-09-20 PROCEDURE — 93005 ELECTROCARDIOGRAM TRACING: CPT | Performed by: EMERGENCY MEDICINE

## 2023-09-20 PROCEDURE — 85007 BL SMEAR W/DIFF WBC COUNT: CPT | Performed by: EMERGENCY MEDICINE

## 2023-09-20 PROCEDURE — 85025 COMPLETE CBC W/AUTO DIFF WBC: CPT | Performed by: EMERGENCY MEDICINE

## 2023-09-20 PROCEDURE — 71250 CT THORAX DX C-: CPT

## 2023-09-20 PROCEDURE — 84484 ASSAY OF TROPONIN QUANT: CPT | Performed by: EMERGENCY MEDICINE

## 2023-09-20 PROCEDURE — 93005 ELECTROCARDIOGRAM TRACING: CPT

## 2023-09-20 PROCEDURE — 83880 ASSAY OF NATRIURETIC PEPTIDE: CPT | Performed by: EMERGENCY MEDICINE

## 2023-09-20 PROCEDURE — 71046 X-RAY EXAM CHEST 2 VIEWS: CPT

## 2023-09-20 PROCEDURE — 36415 COLL VENOUS BLD VENIPUNCTURE: CPT

## 2023-09-20 RX ORDER — SODIUM CHLORIDE 0.9 % (FLUSH) 0.9 %
10 SYRINGE (ML) INJECTION AS NEEDED
Status: DISCONTINUED | OUTPATIENT
Start: 2023-09-20 | End: 2023-09-21 | Stop reason: HOSPADM

## 2023-09-21 DIAGNOSIS — M1A.9XX0 CHRONIC GOUT WITHOUT TOPHUS, UNSPECIFIED CAUSE, UNSPECIFIED SITE: ICD-10-CM

## 2023-09-21 NOTE — ED PROVIDER NOTES
Subjective   History of Present Illness  78-year-old female presents emergency department with complaint of shortness of breath and cough as well as back pain.  This has been ongoing.  She was seen a few weeks ago and followed up with her pulmonologist.  She returned to urgent care today and they were advised to come here for further evaluation.  History of hypertension hyperlipidemia as well as COPD and CHF.  Family also reports history of asthma.  She has also had TIA and stroke in the past.  Review of documentation shows pulmonology who stated that patient had normal PFTs and the did not recommend steroids.  She denies any swelling.  Fever or chills.  Back pain is in the mid thoracic region.  No chest pain.    Family history, surgical history, social history, current medications and allergies are reviewed with the patient and triage documentation and vitals are reviewed.     History provided by:  Patient, medical records and relative   used: No      Review of Systems   Constitutional:  Negative for chills and fever.   HENT:  Negative for congestion and sore throat.    Eyes:  Negative for photophobia and visual disturbance.   Respiratory:  Negative for cough, shortness of breath and wheezing.    Gastrointestinal:  Negative for nausea and vomiting.   Endocrine: Negative.    Genitourinary:  Negative for dysuria, frequency and urgency.   Musculoskeletal:  Positive for back pain. Negative for arthralgias, myalgias and neck pain.   Skin:  Negative for rash and wound.   Allergic/Immunologic: Negative.    Neurological:  Negative for weakness, light-headedness, numbness and headaches.   Hematological: Negative.    Psychiatric/Behavioral: Negative.       Past Medical History:   Diagnosis Date    Arthritis     Asthma     Cancer     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    CHF (congestive heart failure)     COPD (chronic obstructive pulmonary disease)     High blood pressure     Hyperlipidemia     Pacemaker     Stage 3  chronic kidney disease     Stroke     TIA       Allergies   Allergen Reactions    Penicillins Rash       Past Surgical History:   Procedure Laterality Date    BLADDER SURGERY      CARDIAC ELECTROPHYSIOLOGY PROCEDURE N/A 2022    Procedure: PPM generator change - dual;  Surgeon: Shiloh Medrano MD;  Location: [facility] INVASIVE LOC[facility];  Service: Cardiology;  Laterality: N/A;    CARDIAC SURGERY      COLON RESECTION      x 2 small bowel    HYSTERECTOMY      PACEMAKER IMPLANTATION      VAGINAL BIOPSY         Family History   Problem Relation Age of Onset    Cancer Mother     Diabetes Father     Diabetes Sister     Diabetes Brother        Social History     Socioeconomic History    Marital status:    Tobacco Use    Smoking status: Former     Packs/day: 2.00     Years: 45.00     Pack years: 90.00     Types: Cigarettes     Start date: 3/12/1953     Quit date: 3/12/1993     Years since quittin.5     Passive exposure: Past    Smokeless tobacco: Never   Vaping Use    Vaping Use: Never used   Substance and Sexual Activity    Alcohol use: Not Currently    Drug use: Never    Sexual activity: Defer           Objective   Physical Exam  Vitals and nursing note reviewed.   Constitutional:       General: She is not in acute distress.     Appearance: She is well-developed and overweight. She is not ill-appearing, toxic-appearing or diaphoretic.   HENT:      Head: Normocephalic.   Eyes:      Pupils: Pupils are equal, round, and reactive to light.   Neck:      Vascular: No JVD.   Cardiovascular:      Rate and Rhythm: Normal rate and regular rhythm.      Heart sounds: No murmur heard.  Pulmonary:      Effort: Pulmonary effort is normal. No tachypnea, accessory muscle usage or respiratory distress.      Breath sounds: Normal breath sounds. No decreased breath sounds, wheezing, rhonchi or rales.   Chest:      Chest wall: No tenderness.   Abdominal:      General: Bowel sounds are normal.      Palpations: Abdomen is  soft.      Tenderness: There is no abdominal tenderness.   Musculoskeletal:         General: Normal range of motion.      Cervical back: Normal, normal range of motion and neck supple.      Thoracic back: Spasms and tenderness present. No bony tenderness. Normal range of motion.      Lumbar back: Normal.      Right lower leg: No edema.      Left lower leg: No edema.   Skin:     General: Skin is warm and dry.      Capillary Refill: Capillary refill takes less than 2 seconds.   Neurological:      General: No focal deficit present.      Mental Status: She is alert and oriented to person, place, and time.   Psychiatric:         Mood and Affect: Mood normal.         Behavior: Behavior normal.       ECG 12 Lead      Date/Time: 9/20/2023 7:32 PM  Performed by: Harley Carvajal DO  Authorized by: Harley Carvajal DO   Interpreted by physician  Comments: EKG September 20, 2023 at 1932 reveals pacemaker rhythm at 60 bpm.  No obvious acute ischemia.             ED Course      Labs Reviewed   COMPREHENSIVE METABOLIC PANEL - Abnormal; Notable for the following components:       Result Value    Creatinine 1.77 (*)     Potassium 5.4 (*)     eGFR 28.8 (*)     All other components within normal limits    Narrative:     GFR Normal >60  Chronic Kidney Disease <60  Kidney Failure <15    The GFR formula is only valid for adults with stable renal function between ages 18 and 70.   SINGLE HSTROPONIN T - Abnormal; Notable for the following components:    HS Troponin T 14 (*)     All other components within normal limits    Narrative:     High Sensitive Troponin T Reference Range:  <10.0 ng/L- Negative Female for AMI  <15.0 ng/L- Negative Male for AMI  >=10 - Abnormal Female indicating possible myocardial injury.  >=15 - Abnormal Male indicating possible myocardial injury.   Clinicians would have to utilize clinical acumen, EKG, Troponin, and serial changes to determine if it is an Acute Myocardial Infarction or myocardial injury due  to an underlying chronic condition.        CBC WITH AUTO DIFFERENTIAL - Abnormal; Notable for the following components:    WBC 15.79 (*)     Neutrophils, Absolute 8.37 (*)     Lymphocytes, Absolute 5.89 (*)     Monocytes, Absolute 1.09 (*)     Immature Grans, Absolute 0.06 (*)     All other components within normal limits   SINGLE HSTROPONIN T - Abnormal; Notable for the following components:    HS Troponin T 12 (*)     All other components within normal limits    Narrative:     High Sensitive Troponin T Reference Range:  <10.0 ng/L- Negative Female for AMI  <15.0 ng/L- Negative Male for AMI  >=10 - Abnormal Female indicating possible myocardial injury.  >=15 - Abnormal Male indicating possible myocardial injury.   Clinicians would have to utilize clinical acumen, EKG, Troponin, and serial changes to determine if it is an Acute Myocardial Infarction or myocardial injury due to an underlying chronic condition.        BNP (IN-HOUSE) - Normal    Narrative:     Among patients with dyspnea, NT-proBNP is highly sensitive for the detection of acute congestive heart failure. In addition NT-proBNP of <300 pg/ml effectively rules out acute congestive heart failure with 99% negative predictive value.     RAINBOW DRAW    Narrative:     The following orders were created for panel order [place] Draw.  Procedure                               Abnormality         Status                     ---------                               -----------         ------                     Green Top (Gel)[[id number]]                                  Final result               Lavender Top[[id number]]                                     Final result               Gold Top - SST[[id number]]                                   Final result               Light Blue Top[[id number]]                                   Final result                 Please view results for these tests on the individual orders.   SCAN SLIDE   CBC AND DIFFERENTIAL    Narrative:      The following orders were created for panel order CBC & Differential.  Procedure                               Abnormality         Status                     ---------                               -----------         ------                     CBC Auto Differential[[id number]]        Abnormal            Final result               Scan Slide[[id number]]                                       Final result                 Please view results for these tests on the individual orders.   GREEN TOP   LAVENDER TOP   GOLD TOP - SST   LIGHT BLUE TOP     XR Chest 2 View    Result Date: 9/20/2023  Narrative: EXAM: Two view chest COMPARISON: Two-view chest 09/06/2023. HISTORY: SOA Triage Protocol FINDINGS: Frontal and lateral views of  the chest are obtained.  The heart and mediastinal contours are within normal limits.  Cardiac device is again noted. One of the leads demonstrates a discontinuity.  Leads are otherwise intact. The lungs are clear and well-expanded..     Impression: 1.  No acute cardiopulmonary process. 2.  One of the leads of the cardiac device demonstrates discontinuity.    CT Chest Without Contrast Diagnostic    Result Date: 9/20/2023  Narrative: CT CHEST WO CONTRAST DIAGNOSTIC HISTORY: back pain, short of breath COMPARISON: 4/23/2023 and chest x-ray from 9/20/2023 Automated exposure control was also utilized to decrease patient radiation dose. TECHNIQUE: Serial helical tomographic images of the chest were acquired. Multiplanar reformatted images were provided for review. FINDINGS: Neck base: The imaged portion of the neck and thyroid gland is unremarkable. Lungs: There is some linear scarring in the left upper lobe. This appears similar to prior.. No pleural effusion is present. The trachea and bronchial tree are patent. Heart and mediastinum: The heart is normal in size. The great vessels are normal in caliber and appearance. Coronary artery calcifications are noted. There is no pericardial effusion. Small  cystic lesion in the anterior mediastinum and small lymph nodes are similar to prior.. Bones and soft tissues: No acute findings are seen in the bones or surrounding soft tissues. Upper abdomen: No acute findings are seen in the visualized portion of the upper abdomen.     Impression: 1. No evidence of acute cardiothoracic process.     XR Chest PA & Lateral    Result Date: 9/6/2023  Narrative: History: dyspnea COMPARISON: None Findings: PA and lateral views of the chest obtained.Heart size is normal. There is no focal consolidation. There are no osseous lesions.     Impression: No acute abnormality.        Medical Decision Making  Problems Addressed:  Acute bilateral thoracic back pain: complicated acute illness or injury  Chronic kidney disease, unspecified CKD stage: complicated acute illness or injury  Shortness of breath: complicated acute illness or injury    Amount and/or Complexity of Data Reviewed  Labs: ordered.  Radiology: ordered.  ECG/medicine tests: ordered and independent interpretation performed.    Risk  Prescription drug management.    CT chest without acute abnormality.  EKG unremarkable.  High-sensitivity troponin of 12 and BNP are not elevated.  No signs of infectious process despite a white blood cell count of 15,000.  Vital signs remained stable.  Patient declines pain medication while in the emergency department.  Slightly worsened chronic kidney disease.  No explanation of patient's symptoms.  Advised on close outpatient follow-up with pulmonology.  She acknowledges understanding of treatment, plan and agreeable to discharge with outpatient management and follow-up.    Final diagnoses:   Shortness of breath   Chronic kidney disease, unspecified CKD stage   Acute bilateral thoracic back pain        ED Disposition  ED Disposition       ED Disposition   Discharge    Condition   Stable    Comment   --               Isaac Love MD  [address] [place] [address]  [id number]  [phone]          Giselle Corrigan,   [address] [place] [address] DR  [facility] [address] [place] [id number]  [phone]               Medication List      No changes were made to your prescriptions during this visit.            Harley Carvajal,   09/21/23 0617

## 2023-09-22 RX ORDER — ALLOPURINOL 100 MG/1
TABLET ORAL
Qty: 90 TABLET | Refills: 0 | Status: SHIPPED | OUTPATIENT
Start: 2023-09-22

## 2023-09-22 NOTE — TELEPHONE ENCOUNTER
Rx Refill Note  Requested Prescriptions     Pending Prescriptions Disp Refills    allopurinol (ZYLOPRIM) 100 MG tablet [Pharmacy Med Name: Allopurinol 100 MG Oral Tablet] 90 tablet 0     Sig: Take 1 tablet by mouth once daily      Last office visit with prescribing clinician: 8/9/2023   Last telemedicine visit with prescribing clinician: Visit date not found   Next office visit with prescribing clinician: 11/9/2023   {TIP  Encounters:      Gout Agent Protocol [id number]/21/2023 07:22 PM   Protocol Details Most recent eGFR is above 30 in the past 12 months.    Uric acid in past 12 months          {TIP  Please add Last Relevant Lab Date if appropriate: 8/11/23- GFR 37.5             {TIP  Is Refill Pharmacy correct? yes    Would you like a call back once the refill request has been completed: [] Yes [] No    If the office needs to give you a call back, can they leave a voicemail: [] Yes [] No    Shruthi Devine LPN  09/22/23, 08:10 CDT

## 2023-09-25 ENCOUNTER — PATIENT MESSAGE (OUTPATIENT)
Dept: PULMONOLOGY | Facility: CLINIC | Age: 78
End: 2023-09-25
Payer: MEDICARE

## 2023-09-26 ENCOUNTER — TELEPHONE (OUTPATIENT)
Dept: PULMONOLOGY | Facility: CLINIC | Age: 78
End: 2023-09-26
Payer: MEDICARE

## 2023-09-26 NOTE — TELEPHONE ENCOUNTER
Spoke to Rita.   We discussed that her back pain and rib pain with inspiration is not consistent with asthma. Her dyspnea is not consistent with asthma.   She has an intermittent cough that may or may not be asthma related; but is not taking her to the UC and ER.   She will use her albuterol some, she thinks. She is remembering to use her Symbicort 160 most days.   With her ongoing mid-back pain, rib pain, and chest discomfort, would recommend discussing this with her Cardiologist, Dr. Medrano. She has not had ischemic eval. EKG and Trop negative only means there is no evidence of ischemia. Does not rule out angina as a source of her discomfort.

## 2023-09-27 LAB
QT INTERVAL: 422 MS
QTC INTERVAL: 422 MS

## 2024-02-27 ENCOUNTER — POP HEALTH PHARMACY (OUTPATIENT)
Dept: PHARMACY | Facility: OTHER | Age: 79
End: 2024-02-27
Payer: MEDICARE

## 2024-02-27 NOTE — PROGRESS NOTES
Population Health Pharmacy Outreach      Pennie Perdomoman was called today to complete complete medication review (CMR). Documented in Outcomes MTM.    Program Details    [facility]  Status: Enrolled  Effective Dates: 2/27/2024 - present  Responsible Staff: Erin Kern Identified    CMR Needed        Adherence and Medication Management              Summary        Erin Kern, 02/27/24, 7:39 AM EST.

## (undated) DEVICE — PK PM 60

## (undated) DEVICE — ELECTRODE,RT,STRESS,FOAM,50PK: Brand: MEDLINE

## (undated) DEVICE — TBG PENCL TELESCP MEGADYNE SMOKE EVAC 10FT